# Patient Record
Sex: MALE | Race: WHITE | NOT HISPANIC OR LATINO | Employment: FULL TIME | URBAN - METROPOLITAN AREA
[De-identification: names, ages, dates, MRNs, and addresses within clinical notes are randomized per-mention and may not be internally consistent; named-entity substitution may affect disease eponyms.]

---

## 2017-02-01 ENCOUNTER — GENERIC CONVERSION - ENCOUNTER (OUTPATIENT)
Dept: OTHER | Facility: OTHER | Age: 52
End: 2017-02-01

## 2017-02-13 ENCOUNTER — GENERIC CONVERSION - ENCOUNTER (OUTPATIENT)
Dept: OTHER | Facility: OTHER | Age: 52
End: 2017-02-13

## 2017-02-13 LAB
A/G RATIO (HISTORICAL): 1.6 (ref 1.1–2.5)
ALBUMIN SERPL BCP-MCNC: 4 G/DL (ref 3.5–5.5)
ALP SERPL-CCNC: 84 IU/L (ref 39–117)
ALT SERPL W P-5'-P-CCNC: 31 IU/L (ref 0–44)
AST SERPL W P-5'-P-CCNC: 22 IU/L (ref 0–40)
BASOPHILS # BLD AUTO: 0 %
BASOPHILS # BLD AUTO: 0 X10E3/UL (ref 0–0.2)
BILIRUB SERPL-MCNC: 0.3 MG/DL (ref 0–1.2)
BUN SERPL-MCNC: 16 MG/DL (ref 6–24)
BUN/CREA RATIO (HISTORICAL): 20 (ref 9–20)
CALCIUM SERPL-MCNC: 8.9 MG/DL (ref 8.7–10.2)
CHLORIDE SERPL-SCNC: 99 MMOL/L (ref 96–106)
CHOLEST SERPL-MCNC: 263 MG/DL (ref 100–199)
CHOLEST/HDLC SERPL: 5.3 RATIO UNITS (ref 0–5)
CO2 SERPL-SCNC: 23 MMOL/L (ref 18–29)
CREAT SERPL-MCNC: 0.82 MG/DL (ref 0.76–1.27)
DEPRECATED RDW RBC AUTO: 13.3 % (ref 12.3–15.4)
EGFR AFRICAN AMERICAN (HISTORICAL): 117 ML/MIN/1.73
EGFR-AMERICAN CALC (HISTORICAL): 101 ML/MIN/1.73
EOSINOPHIL # BLD AUTO: 0.2 X10E3/UL (ref 0–0.4)
EOSINOPHIL # BLD AUTO: 4 %
GLUCOSE SERPL-MCNC: 147 MG/DL (ref 65–99)
HBA1C MFR BLD HPLC: 7.7 % (ref 4.8–5.6)
HCT VFR BLD AUTO: 42.5 % (ref 37.5–51)
HDLC SERPL-MCNC: 50 MG/DL
HGB BLD-MCNC: 14.5 G/DL (ref 12.6–17.7)
IMM.GRANULOCYTES (CD4/8) (HISTORICAL): 0 %
IMM.GRANULOCYTES (CD4/8) (HISTORICAL): 0 X10E3/UL (ref 0–0.1)
LDLC SERPL CALC-MCNC: 174 MG/DL (ref 0–99)
LYMPHOCYTES # BLD AUTO: 1.3 X10E3/UL (ref 0.7–3.1)
LYMPHOCYTES # BLD AUTO: 27 %
MCH RBC QN AUTO: 30.9 PG (ref 26.6–33)
MCHC RBC AUTO-ENTMCNC: 34.1 G/DL (ref 31.5–35.7)
MCV RBC AUTO: 91 FL (ref 79–97)
MONOCYTES # BLD AUTO: 0.3 X10E3/UL (ref 0.1–0.9)
MONOCYTES (HISTORICAL): 7 %
NEUTROPHILS # BLD AUTO: 3 X10E3/UL (ref 1.4–7)
NEUTROPHILS # BLD AUTO: 62 %
PLATELET # BLD AUTO: 216 X10E3/UL (ref 150–379)
POTASSIUM SERPL-SCNC: 4.9 MMOL/L (ref 3.5–5.2)
RBC (HISTORICAL): 4.69 X10E6/UL (ref 4.14–5.8)
SODIUM SERPL-SCNC: 140 MMOL/L (ref 134–144)
TOT. GLOBULIN, SERUM (HISTORICAL): 2.5 G/DL (ref 1.5–4.5)
TOTAL PROTEIN (HISTORICAL): 6.5 G/DL (ref 6–8.5)
TRIGL SERPL-MCNC: 194 MG/DL (ref 0–149)
VLDLC SERPL CALC-MCNC: 39 MG/DL (ref 5–40)
WBC # BLD AUTO: 4.9 X10E3/UL (ref 3.4–10.8)

## 2017-02-14 LAB — INTERPRETATION (HISTORICAL): NORMAL

## 2017-02-15 ENCOUNTER — GENERIC CONVERSION - ENCOUNTER (OUTPATIENT)
Dept: OTHER | Facility: OTHER | Age: 52
End: 2017-02-15

## 2017-02-16 ENCOUNTER — ALLSCRIPTS OFFICE VISIT (OUTPATIENT)
Dept: OTHER | Facility: OTHER | Age: 52
End: 2017-02-16

## 2017-03-23 ENCOUNTER — ALLSCRIPTS OFFICE VISIT (OUTPATIENT)
Dept: OTHER | Facility: OTHER | Age: 52
End: 2017-03-23

## 2017-06-15 ENCOUNTER — ALLSCRIPTS OFFICE VISIT (OUTPATIENT)
Dept: OTHER | Facility: OTHER | Age: 52
End: 2017-06-15

## 2017-06-20 ENCOUNTER — APPOINTMENT (OUTPATIENT)
Dept: LAB | Age: 52
End: 2017-06-20
Attending: PREVENTIVE MEDICINE

## 2017-06-20 ENCOUNTER — TRANSCRIBE ORDERS (OUTPATIENT)
Dept: ADMINISTRATIVE | Age: 52
End: 2017-06-20

## 2017-06-20 ENCOUNTER — APPOINTMENT (OUTPATIENT)
Dept: RADIOLOGY | Age: 52
End: 2017-06-20
Attending: PREVENTIVE MEDICINE

## 2017-06-20 DIAGNOSIS — Z00.00 ROUTINE GENERAL MEDICAL EXAMINATION AT A HEALTH CARE FACILITY: Primary | ICD-10-CM

## 2017-06-20 DIAGNOSIS — Z00.00 ROUTINE GENERAL MEDICAL EXAMINATION AT A HEALTH CARE FACILITY: ICD-10-CM

## 2017-06-20 LAB
BACTERIA UR QL AUTO: ABNORMAL /HPF
BASOPHILS # BLD AUTO: 0.02 THOUSANDS/ΜL (ref 0–0.1)
BASOPHILS NFR BLD AUTO: 0 % (ref 0–1)
BILIRUB UR QL STRIP: NEGATIVE
BUN SERPL-MCNC: 22 MG/DL (ref 5–25)
CLARITY UR: CLEAR
COLOR UR: YELLOW
CREAT SERPL-MCNC: 0.79 MG/DL (ref 0.6–1.3)
EOSINOPHIL # BLD AUTO: 0.15 THOUSAND/ΜL (ref 0–0.61)
EOSINOPHIL NFR BLD AUTO: 2 % (ref 0–6)
ERYTHROCYTE [DISTWIDTH] IN BLOOD BY AUTOMATED COUNT: 12.8 % (ref 11.6–15.1)
GFR SERPL CREATININE-BSD FRML MDRD: >60 ML/MIN/1.73SQ M
GLUCOSE UR STRIP-MCNC: NEGATIVE MG/DL
HCT VFR BLD AUTO: 39.7 % (ref 36.5–49.3)
HGB BLD-MCNC: 13.3 G/DL (ref 12–17)
HGB UR QL STRIP.AUTO: NEGATIVE
HYALINE CASTS #/AREA URNS LPF: ABNORMAL /LPF
KETONES UR STRIP-MCNC: NEGATIVE MG/DL
LEUKOCYTE ESTERASE UR QL STRIP: NEGATIVE
LYMPHOCYTES # BLD AUTO: 1.36 THOUSANDS/ΜL (ref 0.6–4.47)
LYMPHOCYTES NFR BLD AUTO: 20 % (ref 14–44)
MCH RBC QN AUTO: 30.2 PG (ref 26.8–34.3)
MCHC RBC AUTO-ENTMCNC: 33.5 G/DL (ref 31.4–37.4)
MCV RBC AUTO: 90 FL (ref 82–98)
MONOCYTES # BLD AUTO: 0.38 THOUSAND/ΜL (ref 0.17–1.22)
MONOCYTES NFR BLD AUTO: 6 % (ref 4–12)
NEUTROPHILS # BLD AUTO: 5 THOUSANDS/ΜL (ref 1.85–7.62)
NEUTS SEG NFR BLD AUTO: 72 % (ref 43–75)
NITRITE UR QL STRIP: NEGATIVE
NON-SQ EPI CELLS URNS QL MICRO: ABNORMAL /HPF
NRBC BLD AUTO-RTO: 0 /100 WBCS
PH UR STRIP.AUTO: 6 [PH] (ref 4.5–8)
PLATELET # BLD AUTO: 280 THOUSANDS/UL (ref 149–390)
PMV BLD AUTO: 10.1 FL (ref 8.9–12.7)
PROT UR STRIP-MCNC: ABNORMAL MG/DL
RBC # BLD AUTO: 4.41 MILLION/UL (ref 3.88–5.62)
RBC #/AREA URNS AUTO: ABNORMAL /HPF
SP GR UR STRIP.AUTO: 1.01 (ref 1–1.03)
UROBILINOGEN UR QL STRIP.AUTO: 0.2 E.U./DL
WBC # BLD AUTO: 6.94 THOUSAND/UL (ref 4.31–10.16)
WBC #/AREA URNS AUTO: ABNORMAL /HPF

## 2017-06-20 PROCEDURE — 84520 ASSAY OF UREA NITROGEN: CPT

## 2017-06-20 PROCEDURE — 84202 ASSAY RBC PROTOPORPHYRIN: CPT

## 2017-06-20 PROCEDURE — 82300 ASSAY OF CADMIUM: CPT

## 2017-06-20 PROCEDURE — 36415 COLL VENOUS BLD VENIPUNCTURE: CPT

## 2017-06-20 PROCEDURE — 82570 ASSAY OF URINE CREATININE: CPT | Performed by: PREVENTIVE MEDICINE

## 2017-06-20 PROCEDURE — 83655 ASSAY OF LEAD: CPT | Performed by: PREVENTIVE MEDICINE

## 2017-06-20 PROCEDURE — 82175 ASSAY OF ARSENIC: CPT | Performed by: PREVENTIVE MEDICINE

## 2017-06-20 PROCEDURE — 81001 URINALYSIS AUTO W/SCOPE: CPT | Performed by: PREVENTIVE MEDICINE

## 2017-06-20 PROCEDURE — 86870 RBC ANTIBODY IDENTIFICATION: CPT

## 2017-06-20 PROCEDURE — 83825 ASSAY OF MERCURY: CPT | Performed by: PREVENTIVE MEDICINE

## 2017-06-20 PROCEDURE — 82232 ASSAY OF BETA-2 PROTEIN: CPT | Performed by: PREVENTIVE MEDICINE

## 2017-06-20 PROCEDURE — 82565 ASSAY OF CREATININE: CPT

## 2017-06-20 PROCEDURE — 85025 COMPLETE CBC W/AUTO DIFF WBC: CPT

## 2017-06-20 PROCEDURE — 71010 HB CHEST X-RAY 1 VIEW FRONTAL: CPT

## 2017-06-21 LAB
ARSENIC 24H UR-MCNC: NORMAL UG/L (ref 0–50)
B2 MICROGLOB UR-MCNC: 19 UG/L (ref 0–300)
CREAT UR-MCNC: 0.96 G/L (ref 0.3–3)
INORG ARSENIC UR-MCNC: NORMAL UG/L (ref 0–19)
LEAD 24H UR-MCNC: NORMAL UG/L (ref 0–49)
MERCURY 24H UR-MCNC: NORMAL UG/L (ref 0–19)

## 2017-06-22 LAB
CADMIUM BLD-MCNC: NORMAL UG/L (ref 0–1.2)
FEP BLD-MCNC: 25 UG/DL (ref 0–100)
LEAD BLD-MCNC: 2 UG/DL (ref 0–19)
ZPP RBC-MCNC: 28 UG/DL (ref 0–100)

## 2017-07-19 ENCOUNTER — GENERIC CONVERSION - ENCOUNTER (OUTPATIENT)
Dept: OTHER | Facility: OTHER | Age: 52
End: 2017-07-19

## 2017-07-19 ENCOUNTER — ALLSCRIPTS OFFICE VISIT (OUTPATIENT)
Dept: OTHER | Facility: OTHER | Age: 52
End: 2017-07-19

## 2017-07-19 DIAGNOSIS — E11.622 TYPE 2 DIABETES MELLITUS WITH OTHER SKIN ULCER (CODE) (HCC): ICD-10-CM

## 2017-07-19 DIAGNOSIS — Z89.519: ICD-10-CM

## 2017-07-20 ENCOUNTER — HOSPITAL ENCOUNTER (OUTPATIENT)
Dept: RADIOLOGY | Facility: HOSPITAL | Age: 52
Discharge: HOME | End: 2017-07-20
Attending: FAMILY MEDICINE
Payer: COMMERCIAL

## 2017-07-20 ENCOUNTER — TRANSCRIBE ORDERS (OUTPATIENT)
Dept: ADMINISTRATIVE | Facility: HOSPITAL | Age: 52
End: 2017-07-20

## 2017-07-20 DIAGNOSIS — Z89.512 STATUS POST BELOW KNEE AMPUTATION OF LEFT LOWER EXTREMITY: ICD-10-CM

## 2017-07-20 DIAGNOSIS — L98.499 DIABETES MELLITUS WITH SKIN ULCER (HCC): Primary | ICD-10-CM

## 2017-07-20 DIAGNOSIS — L98.499 DIABETES MELLITUS WITH SKIN ULCER (HCC): ICD-10-CM

## 2017-07-20 DIAGNOSIS — E11.622 DIABETES MELLITUS WITH SKIN ULCER (HCC): ICD-10-CM

## 2017-07-20 DIAGNOSIS — E11.622 DIABETES MELLITUS WITH SKIN ULCER (HCC): Primary | ICD-10-CM

## 2017-07-20 PROCEDURE — 73552 X-RAY EXAM OF FEMUR 2/>: CPT

## 2017-07-21 ENCOUNTER — GENERIC CONVERSION - ENCOUNTER (OUTPATIENT)
Dept: OTHER | Facility: OTHER | Age: 52
End: 2017-07-21

## 2017-07-24 ENCOUNTER — GENERIC CONVERSION - ENCOUNTER (OUTPATIENT)
Dept: OTHER | Facility: OTHER | Age: 52
End: 2017-07-24

## 2017-07-24 LAB
CULTURE RESULT (HISTORICAL): ABNORMAL
RESULT 1 (HISTORICAL): ABNORMAL
SUSCEP. REFLEX (HISTORICAL): ABNORMAL

## 2017-07-26 ENCOUNTER — APPOINTMENT (OUTPATIENT)
Dept: WOUND CARE | Facility: HOSPITAL | Age: 52
End: 2017-07-26
Payer: COMMERCIAL

## 2017-07-26 PROCEDURE — G0463 HOSPITAL OUTPT CLINIC VISIT: HCPCS

## 2017-07-26 PROCEDURE — 99213 OFFICE O/P EST LOW 20 MIN: CPT

## 2017-07-26 PROCEDURE — 97597 DBRDMT OPN WND 1ST 20 CM/<: CPT

## 2017-08-02 ENCOUNTER — APPOINTMENT (OUTPATIENT)
Dept: WOUND CARE | Facility: HOSPITAL | Age: 52
End: 2017-08-02
Payer: COMMERCIAL

## 2017-08-02 PROCEDURE — 97597 DBRDMT OPN WND 1ST 20 CM/<: CPT

## 2017-08-09 ENCOUNTER — APPOINTMENT (OUTPATIENT)
Dept: WOUND CARE | Facility: HOSPITAL | Age: 52
End: 2017-08-09
Payer: COMMERCIAL

## 2017-08-09 PROCEDURE — 97597 DBRDMT OPN WND 1ST 20 CM/<: CPT

## 2017-08-15 ENCOUNTER — GENERIC CONVERSION - ENCOUNTER (OUTPATIENT)
Dept: OTHER | Facility: OTHER | Age: 52
End: 2017-08-15

## 2017-08-16 ENCOUNTER — APPOINTMENT (OUTPATIENT)
Dept: WOUND CARE | Facility: HOSPITAL | Age: 52
End: 2017-08-16
Payer: COMMERCIAL

## 2017-08-16 PROCEDURE — G0463 HOSPITAL OUTPT CLINIC VISIT: HCPCS

## 2017-08-16 PROCEDURE — 99212 OFFICE O/P EST SF 10 MIN: CPT

## 2017-10-30 ENCOUNTER — GENERIC CONVERSION - ENCOUNTER (OUTPATIENT)
Dept: OTHER | Facility: OTHER | Age: 52
End: 2017-10-30

## 2017-10-30 LAB
A/G RATIO (HISTORICAL): 1.7 (ref 1.2–2.2)
ALBUMIN SERPL BCP-MCNC: 4.3 G/DL (ref 3.5–5.5)
ALP SERPL-CCNC: 90 IU/L (ref 39–117)
ALT SERPL W P-5'-P-CCNC: 24 IU/L (ref 0–44)
AST SERPL W P-5'-P-CCNC: 21 IU/L (ref 0–40)
BILIRUB SERPL-MCNC: 0.4 MG/DL (ref 0–1.2)
BUN SERPL-MCNC: 16 MG/DL (ref 6–24)
BUN/CREA RATIO (HISTORICAL): 18 (ref 9–20)
CALCIUM SERPL-MCNC: 9.1 MG/DL (ref 8.7–10.2)
CHLORIDE SERPL-SCNC: 101 MMOL/L (ref 96–106)
CHOLEST SERPL-MCNC: 208 MG/DL (ref 100–199)
CHOLEST/HDLC SERPL: 3.9 RATIO UNITS (ref 0–5)
CO2 SERPL-SCNC: 24 MMOL/L (ref 18–29)
CREAT SERPL-MCNC: 0.91 MG/DL (ref 0.76–1.27)
EGFR AFRICAN AMERICAN (HISTORICAL): 110 ML/MIN/1.73
EGFR-AMERICAN CALC (HISTORICAL): 95 ML/MIN/1.73
GLUCOSE SERPL-MCNC: 203 MG/DL (ref 65–99)
HBA1C MFR BLD HPLC: 8.4 % (ref 4.8–5.6)
HDLC SERPL-MCNC: 53 MG/DL
LDLC SERPL CALC-MCNC: 124 MG/DL (ref 0–99)
POTASSIUM SERPL-SCNC: 4.9 MMOL/L (ref 3.5–5.2)
SODIUM SERPL-SCNC: 140 MMOL/L (ref 134–144)
TOT. GLOBULIN, SERUM (HISTORICAL): 2.5 G/DL (ref 1.5–4.5)
TOTAL PROTEIN (HISTORICAL): 6.8 G/DL (ref 6–8.5)
TRIGL SERPL-MCNC: 155 MG/DL (ref 0–149)
VLDLC SERPL CALC-MCNC: 31 MG/DL (ref 5–40)

## 2017-10-31 LAB
BASOPHILS # BLD AUTO: 0 X10E3/UL (ref 0–0.2)
BASOPHILS # BLD AUTO: 1 %
DEPRECATED RDW RBC AUTO: 12.9 % (ref 12.3–15.4)
EOSINOPHIL # BLD AUTO: 0.2 X10E3/UL (ref 0–0.4)
EOSINOPHIL # BLD AUTO: 5 %
HCT VFR BLD AUTO: 41.9 % (ref 37.5–51)
HGB BLD-MCNC: 14 G/DL (ref 12.6–17.7)
IMM.GRANULOCYTES (CD4/8) (HISTORICAL): 0 %
IMM.GRANULOCYTES (CD4/8) (HISTORICAL): 0 X10E3/UL (ref 0–0.1)
INTERPRETATION (HISTORICAL): NORMAL
LYMPHOCYTES # BLD AUTO: 1.1 X10E3/UL (ref 0.7–3.1)
LYMPHOCYTES # BLD AUTO: 27 %
MCH RBC QN AUTO: 30.8 PG (ref 26.6–33)
MCHC RBC AUTO-ENTMCNC: 33.4 G/DL (ref 31.5–35.7)
MCV RBC AUTO: 92 FL (ref 79–97)
MONOCYTES # BLD AUTO: 0.3 X10E3/UL (ref 0.1–0.9)
MONOCYTES (HISTORICAL): 9 %
NEUTROPHILS # BLD AUTO: 2.3 X10E3/UL (ref 1.4–7)
NEUTROPHILS # BLD AUTO: 58 %
PLATELET # BLD AUTO: 191 X10E3/UL (ref 150–379)
RBC (HISTORICAL): 4.54 X10E6/UL (ref 4.14–5.8)
WBC # BLD AUTO: 3.9 X10E3/UL (ref 3.4–10.8)

## 2017-11-01 ENCOUNTER — GENERIC CONVERSION - ENCOUNTER (OUTPATIENT)
Dept: OTHER | Facility: OTHER | Age: 52
End: 2017-11-01

## 2017-11-22 ENCOUNTER — ALLSCRIPTS OFFICE VISIT (OUTPATIENT)
Dept: OTHER | Facility: OTHER | Age: 52
End: 2017-11-22

## 2017-11-23 NOTE — PROGRESS NOTES
Assessment    1  Benign essential hypertension (401 1) (I10)   2  Diabetes mellitus with peripheral circulatory disorder, controlled (250 70,443 81) (E11 51)   3  Hyperlipidemia (272 4) (E78 5)   4  Adult BMI 28 0-28 9 kg/sq m (V85 24) (Z68 28)    Plan  Benign essential hypertension    · AmLODIPine Besylate 2 5 MG Oral Tablet; Take 1 tablet daily   · Lisinopril 40 MG Oral Tablet; Take 1 tablet daily   · Call [phone] if: You become dizzy or lightheaded, especially when you stand upafter sitting for a while ; Status:Complete;   Done: 96KFP2318   · Call [phone] if: You develop double vision (see two of everything)  ;Status:Complete;   Done: 18HPZ3233   · Call [phone] if: Your blood pressure is frequently higher than 140/90  ;Status:Complete;   Done: 88YTJ1349   · A diet low in sodium and high in potassium, magnesium, and calcium can help yourblood pressure ; Status:Complete;   Done: 56CBB9251   · There are many exercise options for seniors ; Status:Complete;   Done: 42WLP8410   · We recommend that you bring your body mass index down to 26 ; Status:Complete;  Done: 79CNI2265   · Follow-up visit in 1 month Evaluation and Treatment  Follow-up  Status: Hold For -Scheduling  Requested for: 22Nov2017  Diabetes mellitus with peripheral circulatory disorder, controlled    · Levemir FlexTouch 100 UNIT/ML Subcutaneous Solution Pen-injector   · Lantus SoloStar 100 UNIT/ML Subcutaneous Solution Pen-injector; 54 units sqdaily  Hyperlipidemia    · Atorvastatin Calcium 40 MG Oral Tablet   · Rosuvastatin Calcium 20 MG Oral Tablet; Take 1 tablet daily    Discussion/Summary    BP is not well controlled but he has been missing doses  Will not change meds for now but will change the timing to take both in AM so he does not miss doses  will change from atorvastatin to rosuvastatin for better lipid control  try to get lantus covered as glucose control was much better on this medication   He and his wife are aware this may not be approved by insurance  Asked to call with fasting and post prandial glucose readings in 2 weeks  up bp check in 1 month  local care to folliculitis under prosthesis, which is improving  Call for any worsening symptoms  Chief Complaint  Pt here for a medication f/u and b/w review  sp/cma      History of Present Illness  He and his wife feel his glucose has been elevated ever since changing from lantus to levemir, due to insurance  They would like to go back on lantus  been forgetting nighttime doses of statin and bp meds because he can only remember to take things in the morning  a few pimples on his leg under his prosthesis, which his wife popped and cleaned before using antibiotic ointment  They thought it may be a reaction to the new base to his prosthesis  The patient states he has been stable with his Type II Diabetes control since the last visit  Symptoms:   The patient states his hyperlipidemia has been under good control since the last visit  Comorbid Illnesses: diabetes mellitus-- and-- hypertension  He has no significant interval events  Symptoms: The patient is currently asymptomatic  Associated symptoms include no focal neurologic deficits-- and-- no memory loss  Medications: the patient is adherent with his medication regimen  -- He denies medication side effects  The patient is not doing well with his hyperlipidemia goals  the patient's LDL goal is 100 mg/dL  The patient presents for follow-up of essential hypertension  The patient states he has been doing well with his blood pressure control since the last visit  He has no significant interval events  Symptoms: The patient is currently asymptomatic  Home monitoring: The patient is not checking blood pressure at home  Medications: the patient is not adherent with his medication regimen  -- He denies medication side effects        Review of Systems   Constitutional: No fever or chills, feels well, no tiredness, no recent weight gain or weight loss  Cardiovascular: No complaints of slow heart rate, no fast heart rate, no chest pain, no palpitations, no leg claudication, no lower extremity  Respiratory: No complaints of shortness of breath, no wheezing, no cough, no SOB on exertion, no orthopnea or PND  Active Problems    1  Anxiety disorder (300 00) (F41 9)   2  Benign essential hypertension (401 1) (I10)   3  Colon cancer screening (V76 51) (Z12 11)   4  Diabetes mellitus with peripheral circulatory disorder, controlled (250 70,443 81) (E11 51)   5  Diabetic ulcer of left lower leg with fat layer exposed (250 80,707 10) (E11 622,L97 922)   6  Encounter for colorectal cancer screening (V76 51) (Z12 11,Z12 12)   7  Erectile dysfunction (607 84) (F52 21)   8  Hyperlipidemia (272 4) (E78 5)   9  Need for prophylactic vaccination and inoculation against influenza (V04 81) (Z23)   10  Peripheral arterial disease (443 9) (I73 9)   11  Pre-op evaluation (V72 84) (Z01 818)   12  S/P BKA (below knee amputation) (V49 75) (Z89 519)   13  Ulcer of leg, chronic (707 10) (L97 909)    Past Medical History    The active problems and past medical history were reviewed and updated today  Surgical History  1  History of Amputation Of Leg Below Knee    The surgical history was reviewed and updated today  Family History  Mother    1  No pertinent family history    The family history was reviewed and updated today  Social History     · Former smoker (C66 23) (P53 285)  The social history was reviewed and updated today  The social history was reviewed and is unchanged  Current Meds   1  AmLODIPine Besylate 2 5 MG Oral Tablet; Take 1 tablet daily; Therapy: 96QSE5878 to (Evaluate:91Ujc7784)  Requested for: 98ZRP3454; Last Rx:01Wfb3649 Ordered   2  Atorvastatin Calcium 40 MG Oral Tablet; TAKE 1 TABLET DAILY AS     DIRECTED; Therapy: 78WWN0793 to (Evaluate:59Jld6799)  Requested for: 57OPW6263; Last Rx:06Avj7439 Ordered   3   [employer] In Vitro Strip; test QID; diagnosis insulin requiring DM; Therapy: 73Ifn5396 to (Last UN:40TYN3510)  Requested for: 71TZT0797 Ordered   4  Carlos Microlet Lancets Miscellaneous; test QID; dx:  insulin requiring DM; Therapy: 88Yca3937 to  Requested for: 14YDY6728 Recorded   5  BD Pen Needle Mini U/F 31G X 5 MM Miscellaneous; USE AS DIRECTED once daily; Therapy: 72QNB9271 to (Evaluate:67Evf5650)  Requested for: 63JKZ2673; Last Rx:12Jan2017 Ordered   6  Levemir FlexTouch 100 UNIT/ML Subcutaneous Solution Pen-injector; 52 units qhs; Therapy: 31QDV3008 to (Last Rx:15Jun2017)  Requested for: 55LRJ6301 Ordered   7  Lisinopril 40 MG Oral Tablet; Take 1 tablet daily; Therapy: 37MNU5580 to (Evaluate:52Acg4330)  Requested for: 74Xzs3111; Last Rx:68Pyz2061 Ordered   8  Viagra 100 MG Oral Tablet; as directed; Therapy: 69QVC8647 to (OLKYLECF:86BLW3790)  Requested for: 29FGO5563; Last Rx:19Jan2015 Ordered    The medication list was reviewed and updated today  Allergies  1  Penicillins  2  Bee sting    Vitals  Vital Signs    Recorded: 22Nov2017 04:48PM Recorded: 22Nov2017 03:42PM   Temperature  97 4 F   Heart Rate  80   Respiration  16   Systolic 292 389   Diastolic 90 261   BP Comments recheck    Weight  182 lb    BMI Calculated  28 51   BSA Calculated  1 94       Physical Exam   Constitutional  General appearance: No acute distress, well appearing and well nourished  Ears, Nose, Mouth, and Throat  Oropharynx: Normal with no erythema, edema, exudate or lesions  Pulmonary  Respiratory effort: No increased work of breathing or signs of respiratory distress  Auscultation of lungs: Clear to auscultation, equal breath sounds bilaterally, no wheezes, no rales, no rhonci  Cardiovascular  Auscultation of heart: Normal rate and rhythm, normal S1 and S2, without murmurs  Examination of extremities for edema and/or varicosities: Normal    Carotid pulses: Normal    Lymphatic  Palpation of lymph nodes in neck: No lymphadenopathy  Skin  Skin and subcutaneous tissue: Abnormal  -- (L lateral knee with follicularly based erythema with no fluctuance or edema)        Results/Data  (1) CBC/PLT/DIFF 30Oct2017 09:50AM Vida Niño     Test Name Result Flag Reference   WBC 3 9 x10E3/uL  3 4-10 8   RBC 4 54 x10E6/uL  4 14-5 80   Hemoglobin 14 0 g/dL  12 6-17 7   Hematocrit 41 9 %  37 5-51 0   MCV 92 fL  79-97   MCH 30 8 pg  26 6-33 0   MCHC 33 4 g/dL  31 5-35 7   RDW 12 9 %  12 3-15 4   Platelets 899 B81N1/BM  150-379   Neutrophils 58 %  Not Estab  Lymphs 27 %  Not Estab  Monocytes 9 %  Not Estab  Eos 5 %  Not Estab  Basos 1 %  Not Estab  Neutrophils (Absolute) 2 3 x10E3/uL  1 4-7 0   Lymphs (Absolute) 1 1 x10E3/uL  0 7-3 1   Monocytes(Absolute) 0 3 x10E3/uL  0 1-0 9   Eos (Absolute) 0 2 x10E3/uL  0 0-0 4   Baso (Absolute) 0 0 x10E3/uL  0 0-0 2   Immature Granulocytes 0 %  Not Estab     Immature Grans (Abs) 0 0 x10E3/uL  0 0-0 1     (1) COMPREHENSIVE METABOLIC PANEL 62DCT0014 02:58TT Oneil Sternjovany     Test Name Result Flag Reference   Glucose, Serum 203 mg/dL H 65-99   BUN 16 mg/dL  6-24   Creatinine, Serum 0 91 mg/dL  0 76-1 27   BUN/Creatinine Ratio 18  9-20   Sodium, Serum 140 mmol/L  134-144   Potassium, Serum 4 9 mmol/L  3 5-5 2   Chloride, Serum 101 mmol/L     Carbon Dioxide, Total 24 mmol/L  18-29   Calcium, Serum 9 1 mg/dL  8 7-10 2   Protein, Total, Serum 6 8 g/dL  6 0-8 5   Albumin, Serum 4 3 g/dL  3 5-5 5   Globulin, Total 2 5 g/dL  1 5-4 5   A/G Ratio 1 7  1 2-2 2   Bilirubin, Total 0 4 mg/dL  0 0-1 2   Alkaline Phosphatase, S 90 IU/L     AST (SGOT) 21 IU/L  0-40   ALT (SGPT) 24 IU/L  0-44   eGFR If NonAfricn Am 95 mL/min/1 73  >59   eGFR If Africn Am 110 mL/min/1 73  >59     (1) LIPID PANEL, FASTING 30Oct2017 09:50AM Oneil Stern     Test Name Result Flag Reference   Cholesterol, Total 208 mg/dL H 100-199   Triglycerides 155 mg/dL H 0-149   HDL Cholesterol 53 mg/dL  >39   VLDL Cholesterol Nir 31 mg/dL  5-40 LDL Cholesterol Calc 124 mg/dL H 0-99   T  Chol/HDL Ratio 3 9 ratio units  0 0-5 0     T  Chol/HDL Ratio                                                            Men  Women                                              1/2 Avg  Risk  3 4    3 3                                                  Avg Risk  5 0    4 4                                               2X Avg  Risk  9 6    7 1                                               3X Avg  Risk 23 4   11 0     (1) HEMOGLOBIN A1C 30Oct2017 09:50AM Latricia Stern     Test Name Result Flag Reference   Hemoglobin A1c 8 4 % H 4 8-5 6     Pre-diabetes: 5 7 - 6 4          Diabetes: >6 4          Glycemic control for adults with diabetes: <7 0       Signatures   Electronically signed by : BURTON Bailon ; Nov 22 2017  4:50PM EST                       (Author)

## 2017-12-27 LAB
LEFT EYE DIABETIC RETINOPATHY: NORMAL
RIGHT EYE DIABETIC RETINOPATHY: NORMAL

## 2018-01-10 NOTE — RESULT NOTES
Discussion/Summary   culture gre out Staphylococcus and it is suscptable to antibiotic given     Verified Results  (LC) Aerobic Bacterial Culture 41PQR6563 12:00AM Reedash Goncalvesner     Test Name Result Flag Reference   Aerobic Bacterial Culture Final report A    Result 1  A    Staphylococcus aureus   Heavy growth  Based on resistance to penicillin and susceptibility to oxacillin  this isolate would be susceptible to:  * Penicillinase-stable penicillins; such as:      Cloxacillin      Dicloxacillin      Nafcillin  * Beta-lactam/beta-lactamase inhibitor combinations; such as:      Amoxicillin-clavulanic acid      Ampicillin-sulbactam  * Antistaphylococcal cephems; such as:      Cefaclor      Cefuroxime  * Antistaphylococcal carbapenems; such as:      Imipenem      Meropenem   Antimicrobial Susceptibility Comment     ** S = Susceptible; I = Intermediate; R = Resistant **                     P = Positive; N = Negative              MICS are expressed in micrograms per mL     Antibiotic                 RSLT#1    RSLT#2    RSLT#3    RSLT#4  Ciprofloxacin                  S  Clindamycin                    R  Erythromycin                   R  Gentamicin                     S  Levofloxacin                   S  Linezolid                      S  Moxifloxacin                   S  Oxacillin                      S  Penicillin                     R  Quinupristin/Dalfopristin      S  Rifampin                       S  Tetracycline                   R  Trimethoprim/Sulfa             S  Vancomycin                     S

## 2018-01-11 NOTE — PROGRESS NOTES
Assessment    1  Boil (680 9) (L02 92)   2  Benign essential hypertension (401 1) (I10)    Plan  Benign essential hypertension    · AmLODIPine Besylate 2 5 MG Oral Tablet; Take 1 tablet daily  Boil    · Mupirocin Calcium 2 % External Cream (Bactroban); APPLY AND GENTLY  MASSAGE INTO AFFECTED AREA(S) TWICE DAILY   · Cephalexin 500 MG Oral Tablet (Cephalexin Monohydrate); TAKE 1 TABLET 3  TIMES DAILY    Discussion/Summary    He will add amlodipine for his blood pressure and follow up in 2 weeks for bp recheck  Advised topicals, wound care, and oral antibiotics for R thigh boil  He will call or return sooner than 2 weeks if not improving  Chief Complaint  red raised lump on right leg above knee      History of Present Illness  HPI: Started 2 days ago with a pimple on R thigh above knee  Now has redness and tenderness around the lesion  No fever or constitutional symptoms  BP has been up  Had to put off recent eye surgery  States he is compliant with his medications  Review of Systems    Constitutional: no fever or chills, feels well, no tiredness, no recent weight loss or weight gain  Active Problems    1  Anxiety disorder (300 00) (F41 9)   2  Atherosclerosis of native artery of other extremity with ulceration (440 23,707 9) (I70 25)   3  Benign essential hypertension (401 1) (I10)   4  Chronic Non-pressure Ulcer Of The Lower Limbs (707 10)   5  Colon cancer screening (V76 51) (Z12 11)   6  Diabetes Mellitus (250 00)   7  Diabetes mellitus with peripheral circulatory disorder, controlled (250 70,443 81)   (E11 51)   8  Diabetic foot ulcer (250 80,707 15) (E11 621,L97 509)   9  Erectile dysfunction (607 84) (F52 21)   10  Hyperlipidemia (272 4) (E78 5)   11  Need for prophylactic vaccination and inoculation against influenza (V04 81) (Z23)   12  Peripheral arterial disease (443 9) (I73 9)   13  Pre-op evaluation (V72 84) (Z01 818)   14  Pressure ulcer of buttock (707 05,707 20) (L89 309)   15   Type 2 diabetes mellitus (250 00) (E11 9)   16  Type 2 diabetes mellitus with hyperglycemia (250 00) (E11 65)   17  Ulcer of leg, chronic (707 10) (L97 909)   18  Uncontrolled diabetes mellitus type 2 with atherosclerosis of arteries of extremities    (250 72,440 20) (E11 65,E11 59,I70 209)    Past Medical History    1  Chronic Non-pressure Ulcer Of The Lower Limbs (707 10)   2  Diabetic foot ulcer (250 80,707 15) (E11 621,L97 509)  Active Problems And Past Medical History Reviewed: The active problems and past medical history were reviewed and updated today  Family History    1  No pertinent family history  Family History Reviewed: The family history was reviewed and updated today  Social History    · Former smoker (T39 52) (O07 552)  The social history was reviewed and updated today  The social history was reviewed and is unchanged  Surgical History    1  History of Amputation Of Leg Below Knee  Surgical History Reviewed: The surgical history was reviewed and updated today  Current Meds   1  Atorvastatin Calcium 40 MG Oral Tablet; TAKE 1 TABLET DAILY AS DIRECTED; Therapy: 27BPT9488 to (Joselyn Hickman)  Requested for: 04Kat3986; Last   Rx:56Ise9589 Ordered   2  Carlos Contour Test In [employer]; test QID; diagnosis insulin requiring DM; Therapy: 54Url6617 to (Last TD:24LXJ7224)  Requested for: 83TRL9064 Ordered   3  Carlos Microlet Lancets Miscellaneous; test QID; dx:  insulin requiring DM; Therapy: 62Sde5418 to  Requested for: 62LJC2055 Recorded   4  BD Pen Needle Mini U/F 31G X 5 MM Miscellaneous; USE AS DIRECTED once daily; Therapy: 48CNU0428 to (Evaluate:06Jan2016)  Requested for: 08TDD6285; Last   Rx:08Oct2015 Ordered   5  Lantus SoloStar 100 UNIT/ML Subcutaneous Solution Pen-injector; INJECT   SUBCUTANEOUSLY 44 units; Therapy: 74Lnd3594 to (Thais Field)  Requested for: 10Eyh3115; Last   Rx:73Qag3720 Ordered   6  Lisinopril 40 MG Oral Tablet;  Take 1 tablet daily; Therapy: 35MHI6290 to (Evaluate:27Jml3808)  Requested for: 35YRW0975; Last   Rx:28Jan2016 Ordered   7  Viagra 100 MG Oral Tablet; as directed; Therapy: 49AWH8718 to (EOBQXZPJ:15MSD8681)  Requested for: 42KFL9579; Last   Rx:19Jan2015 Ordered    The medication list was reviewed and updated today  Allergies    1  Penicillins    2  Bee sting    Vitals   Recorded: 75Djl3547 03:35PM   Temperature 97 3 F   Heart Rate 72   Respiration 18   Systolic 230   Diastolic 86   Height 5 ft 7 in   Weight 186 lb    BMI Calculated 29 13   BSA Calculated 1 96     Physical Exam    Constitutional   General appearance: No acute distress, well appearing and well nourished  Pulmonary   Auscultation of lungs: Clear to auscultation, equal breath sounds bilaterally, no wheezes, no rales, no rhonci  Cardiovascular   Auscultation of heart: Normal rate and rhythm, normal S1 and S2, without murmurs  Skin   Skin and subcutaneous tissue: Abnormal   (R lower thigh with 3 cm raised, red area with lesser erythema surrounding   No discharge or fluctuance  )        Signatures   Electronically signed by : BURTON Robles ; Feb  3 2016  3:52PM EST                       (Author)

## 2018-01-11 NOTE — RESULT NOTES
Discussion/Summary   see task     Verified Results  * XR FEMUR 2 VIEW LEFT 80Lbt0941 07:50AM Moe Cruz     Test Name Result Flag Reference   XR FEMUR 2 VW LEFT (Report)     LEFT FEMUR     INDICATION: Skin ulceration of the distal femur  COMPARISON: None     VIEWS: AP and lateral;     IMAGES: 7     FINDINGS:     There is no acute fracture or dislocation  There are degenerative changes at the knee  No lytic or blastic lesions are seen  No periosteal reaction or osseous destruction  There are atherosclerotic calcifications  There is an addition soft tissue swelling at the level of the lower extremity stump at the site of amputation  No subcutaneous emphysema or retained radiopaque foreign body  IMPRESSION:     No acute osseous abnormality  Soft tissue swelling at the level of the lower extremity stump status post BKA         Workstation performed: [id number]     Signed by:   Afsaneh Rodarte MD   7/21/17

## 2018-01-12 VITALS
TEMPERATURE: 98.2 F | HEART RATE: 84 BPM | RESPIRATION RATE: 16 BRPM | HEIGHT: 67 IN | SYSTOLIC BLOOD PRESSURE: 142 MMHG | DIASTOLIC BLOOD PRESSURE: 90 MMHG | WEIGHT: 188.25 LBS | BODY MASS INDEX: 29.55 KG/M2

## 2018-01-12 NOTE — RESULT NOTES
Verified Results  (1) LIPID PANEL, FASTING [id number] 08:44AM [employer]     Test Name Result Flag Reference   Cholesterol, Total 263 mg/dL H 100-199   Triglycerides 194 mg/dL H 0-149   HDL Cholesterol 50 mg/dL  >39   VLDL Cholesterol Nir 39 mg/dL  5-40   LDL Cholesterol Calc 174 mg/dL H 0-99   T  Chol/HDL Ratio 5 3 ratio units H 0 0-5 0   T  Chol/HDL Ratio                                                             Men  Women                                               1/2 Avg  Risk  3 4    3 3                                                   Avg Risk  5 0    4 4                                                2X Avg  Risk  9 6    7 1                                                3X Avg  Risk 23 4   11 0     (1) CBC/PLT/DIFF 83KTX4466 08:44AM Shirin Butts     Test Name Result Flag Reference   WBC 4 9 x10E3/uL  3 4-10 8   RBC 4 69 x10E6/uL  4 14-5 80   Hemoglobin 14 5 g/dL  12 6-17 7   Hematocrit 42 5 %  37 5-51 0   MCV 91 fL  79-97   MCH 30 9 pg  26 6-33 0   Baso (Absolute) 0 0 x10E3/uL  0 0-0 2   Immature Granulocytes 0 %     Immature Grans (Abs) 0 0 x10E3/uL  0 0-0 1   Eos 4 %     Basos 0 %     Neutrophils (Absolute) 3 0 x10E3/uL  1 4-7 0   Lymphs (Absolute) 1 3 x10E3/uL  0 7-3 1   Monocytes(Absolute) 0 3 x10E3/uL  0 1-0 9   Eos (Absolute) 0 2 x10E3/uL  0 0-0 4   MCHC 34 1 g/dL  31 5-35 7   RDW 13 3 %  12 3-15 4   Platelets 227 B61H6/XY  150-379   Neutrophils 62 %     Lymphs 27 %     Monocytes 7 %       (1) COMPREHENSIVE METABOLIC PANEL 40XFL0667 42:37WI Hotchkin, Nasir Dariusz     Test Name Result Flag Reference   Glucose, Serum 147 mg/dL H 65-99   BUN 16 mg/dL  6-24   Creatinine, Serum 0 82 mg/dL  0 76-1 27   eGFR If NonAfricn Am 101 mL/min/1 73  >59   eGFR If Africn Am 117 mL/min/1 73  >59   BUN/Creatinine Ratio 20  9-20   ALT (SGPT) 31 IU/L  0-44   Albumin, Serum 4 0 g/dL  3 5-5 5   Globulin, Total 2 5 g/dL  1 5-4 5   A/G Ratio 1 6  1 1-2 5   **Effective March 13, 2017 the reference interval** for A/G Ratio will be changing to: Age                Male          Female                           0 -  7 days       1 1 - 2 3       1 1 - 2 3                           8 - 30 days       1 2 - 2 8       1 2 - 2 8                           1 -  6 months     1 3 - 3 6       1 3 - 3 6                    7 months -  5 years      1 5 - 2 6       1 5 - 2 6                              > 5 years      1 2 - 2 2       1 2 - 2 2   Bilirubin, Total 0 3 mg/dL  0 0-1 2   Alkaline Phosphatase, S 84 IU/L     AST (SGOT) 22 IU/L  0-40   Sodium, Serum 140 mmol/L  134-144   Potassium, Serum 4 9 mmol/L  3 5-5 2   Chloride, Serum 99 mmol/L     Carbon Dioxide, Total 23 mmol/L  18-29   Calcium, Serum 8 9 mg/dL  8 7-10 2   Protein, Total, Serum 6 5 g/dL  6 0-8 5     (1) HEMOGLOBIN A1C [id number] 08:44AM Karmen Stern     Test Name Result Flag Reference   Hemoglobin A1c 7 7 % H 4 8-5 6   Pre-diabetes: 5 7 - 6 4           Diabetes: >6 4           Glycemic control for adults with diabetes: <7 0     [facility]) Cardiovascular Risk Assessment 22LXR4347 08:44AM Mainor Stern     Test Name Result Flag Reference   Interpretation Note     Supplement report is available  PDF Image

## 2018-01-13 VITALS
TEMPERATURE: 97.7 F | HEART RATE: 80 BPM | HEIGHT: 67 IN | WEIGHT: 181 LBS | DIASTOLIC BLOOD PRESSURE: 80 MMHG | BODY MASS INDEX: 28.41 KG/M2 | SYSTOLIC BLOOD PRESSURE: 126 MMHG | RESPIRATION RATE: 20 BRPM

## 2018-01-14 VITALS
DIASTOLIC BLOOD PRESSURE: 80 MMHG | TEMPERATURE: 97.1 F | RESPIRATION RATE: 18 BRPM | BODY MASS INDEX: 27.47 KG/M2 | HEIGHT: 67 IN | SYSTOLIC BLOOD PRESSURE: 146 MMHG | HEART RATE: 86 BPM | WEIGHT: 175 LBS

## 2018-01-15 VITALS
BODY MASS INDEX: 28.25 KG/M2 | RESPIRATION RATE: 20 BRPM | DIASTOLIC BLOOD PRESSURE: 76 MMHG | WEIGHT: 180 LBS | HEIGHT: 67 IN | SYSTOLIC BLOOD PRESSURE: 132 MMHG | TEMPERATURE: 97.8 F | HEART RATE: 76 BPM

## 2018-01-15 VITALS
RESPIRATION RATE: 16 BRPM | DIASTOLIC BLOOD PRESSURE: 90 MMHG | BODY MASS INDEX: 28.5 KG/M2 | WEIGHT: 182 LBS | SYSTOLIC BLOOD PRESSURE: 130 MMHG | TEMPERATURE: 97.4 F | HEART RATE: 80 BPM

## 2018-01-15 NOTE — RESULT NOTES
Verified Results  [facility]) CBC/Diff Ambiguous Default 26XDN1682 07:31AM Vida Niño     Test Name Result Flag Reference   WBC 3 7 x10E3/uL  3 4-10 8   RBC 4 52 x10E6/uL  4 14-5 80   Hemoglobin 13 6 g/dL  12 6-17 7   Hematocrit 40 5 %  37 5-51 0   MCV 90 fL  79-97   MCH 30 1 pg  26 6-33 0   MCHC 33 6 g/dL  31 5-35 7   RDW 13 2 %  12 3-15 4   Platelets 928 X51Q3/SF  150-379   Neutrophils 55 %     Lymphs 27 %     Monocytes 12 %     Eos 5 %     Basos 1 %     Neutrophils (Absolute) 2 1 x10E3/uL  1 4-7 0   Lymphs (Absolute) 1 0 x10E3/uL  0 7-3 1   Monocytes(Absolute) 0 4 x10E3/uL  0 1-0 9   Eos (Absolute) 0 2 x10E3/uL  0 0-0 4   Baso (Absolute) 0 0 x10E3/uL  0 0-0 2   Immature Granulocytes 0 %     Immature Grans (Abs) 0 0 x10E3/uL  0 0-0 1     (LC) Comp   Metabolic Panel (13) [id number] 07:31AM Oneil Stern     Test Name Result Flag Reference   Glucose, Serum 159 mg/dL H 65-99   BUN 18 mg/dL  6-24   Creatinine, Serum 0 75 mg/dL L 0 76-1 27   eGFR If NonAfricn Am 105 mL/min/1 73  >59   eGFR If Africn Am 122 mL/min/1 73  >59   BUN/Creatinine Ratio 24 H 9-20   Sodium, Serum 140 mmol/L  134-144   Potassium, Serum 4 6 mmol/L  3 5-5 2   Chloride, Serum 102 mmol/L     Carbon Dioxide, Total 22 mmol/L  18-29   Calcium, Serum 8 7 mg/dL  8 7-10 2   Protein, Total, Serum 6 4 g/dL  6 0-8 5   Albumin, Serum 4 1 g/dL  3 5-5 5   Globulin, Total 2 3 g/dL  1 5-4 5   A/G Ratio 1 8  1 1-2 5   Bilirubin, Total 0 4 mg/dL  0 0-1 2   Alkaline Phosphatase, S 79 IU/L     AST (SGOT) 20 IU/L  0-40     (LC) Hemoglobin A1c 26Mar2016 07:31AM Oneil Stern     Test Name Result Flag Reference   Hemoglobin A1c 7 6 %Hb H    Reference Range:  American Diabetes Association (ADA) Guidelines:  <5 7: Decreased risk for diabetes  5 7 - 6 4: Increased risk for diabetes  >6 4: Ongoing Hyperglycemia of any cause  <7 0: Glycemic control for adults with diabetes   Estimated Average Glucose 171 mg/dL       () Lipid Panel [id number] 07:31AM Jarred Marely Wall     Test Name Result Flag Reference   Cholesterol, Total 195 mg/dL  100-199   Triglycerides 139 mg/dL  0-149   HDL Cholesterol 55 mg/dL  >39   According to ATP-III Guidelines, HDL-C >59 mg/dL is considered a  negative risk factor for CHD  VLDL Cholesterol Nir 28 mg/dL  5-40   LDL Cholesterol Calc 112 mg/dL H 0-99     [facility]) Cardiovascular Risk Assessment 26Mar2016 07:31AM Marely Stern     Test Name Result Flag Reference   Interpretation Note     Supplement report is available  PDF Image

## 2018-01-16 NOTE — RESULT NOTES
Discussion/Summary   Sugar and cholesterol are high  Please make an appointment to discuss your results  - Dr Keya Marcus     Verified Results  (1) CBC/PLT/DIFF [id number] 09:50AM Svitlana John     Test Name Result Flag Reference   WBC 3 9 x10E3/uL  3 4-10 8   RBC 4 54 x10E6/uL  4 14-5 80   Hemoglobin 14 0 g/dL  12 6-17 7   Hematocrit 41 9 %  37 5-51 0   MCV 92 fL  79-97   MCH 30 8 pg  26 6-33 0   MCHC 33 4 g/dL  31 5-35 7   RDW 12 9 %  12 3-15 4   Platelets 827 [id number]/YQ  150-379   Neutrophils 58 %  Not Estab  Lymphs 27 %  Not Estab  Monocytes 9 %  Not Estab  Eos 5 %  Not Estab  Basos 1 %  Not Estab  Neutrophils (Absolute) 2 3 x10E3/uL  1 4-7 0   Lymphs (Absolute) 1 1 x10E3/uL  0 7-3 1   Monocytes(Absolute) 0 3 x10E3/uL  0 1-0 9   Eos (Absolute) 0 2 x10E3/uL  0 0-0 4   Baso (Absolute) 0 0 x10E3/uL  0 0-0 2   Immature Granulocytes 0 %  Not Estab     Immature Grans (Abs) 0 0 x10E3/uL  0 0-0 1     (1) COMPREHENSIVE METABOLIC PANEL [id number] 54:90JV Yoav Stern     Test Name Result Flag Reference   Glucose, Serum 203 mg/dL H 65-99   BUN 16 mg/dL  6-24   Creatinine, Serum 0 91 mg/dL  0 76-1 27   BUN/Creatinine Ratio 18  9-20   Sodium, Serum 140 mmol/L  134-144   Potassium, Serum 4 9 mmol/L  3 5-5 2   Chloride, Serum 101 mmol/L     Carbon Dioxide, Total 24 mmol/L  18-29   Calcium, Serum 9 1 mg/dL  8 7-10 2   Protein, Total, Serum 6 8 g/dL  6 0-8 5   Albumin, Serum 4 3 g/dL  3 5-5 5   Globulin, Total 2 5 g/dL  1 5-4 5   A/G Ratio 1 7  1 2-2 2   Bilirubin, Total 0 4 mg/dL  0 0-1 2   Alkaline Phosphatase, S 90 IU/L     AST (SGOT) 21 IU/L  0-40   ALT (SGPT) 24 IU/L  0-44   eGFR If NonAfricn Am 95 mL/min/1 73  >59   eGFR If Africn Am 110 mL/min/1 73  >59     (1) LIPID PANEL, FASTING 30Oct2017 09:50AM Yoav Stern     Test Name Result Flag Reference   Cholesterol, Total 208 mg/dL H 100-199   Triglycerides 155 mg/dL H 0-149   HDL Cholesterol 53 mg/dL  >39   VLDL Cholesterol Nir 31 mg/dL  5-40   LDL Cholesterol Calc 124 mg/dL H 0-99   T  Chol/HDL Ratio 3 9 ratio units  0 0-5 0   T  Chol/HDL Ratio                                                             Men  Women                                               1/2 Avg  Risk  3 4    3 3                                                   Avg Risk  5 0    4 4                                                2X Avg  Risk  9 6    7 1                                                3X Avg  Risk 23 4   11 0     (1) HEMOGLOBIN A1C 30Oct2017 09:50AM Blaire Stern     Test Name Result Flag Reference   Hemoglobin A1c 8 4 % H 4 8-5 6   Pre-diabetes: 5 7 - 6 4           Diabetes: >6 4           Glycemic control for adults with diabetes: <7 0     [facility]) Cardiovascular Risk Assessment 30Oct2017 09:50AM Blaire Stern     Test Name Result Flag Reference   Interpretation Note     Supplemental report is available  PDF Image

## 2018-02-05 DIAGNOSIS — E78.2 MIXED HYPERLIPIDEMIA: Primary | ICD-10-CM

## 2018-02-05 RX ORDER — ATORVASTATIN CALCIUM 40 MG/1
TABLET, FILM COATED ORAL
Qty: 90 TABLET | Refills: 3 | Status: SHIPPED | OUTPATIENT
Start: 2018-02-05 | End: 2019-10-03 | Stop reason: ALTCHOICE

## 2018-04-29 DIAGNOSIS — I10 BENIGN ESSENTIAL HYPERTENSION: Primary | ICD-10-CM

## 2018-04-29 RX ORDER — AMLODIPINE BESYLATE 2.5 MG/1
TABLET ORAL
Qty: 90 TABLET | Refills: 0 | Status: SHIPPED | OUTPATIENT
Start: 2018-04-29 | End: 2018-08-25 | Stop reason: SDUPTHER

## 2018-05-03 ENCOUNTER — TRANSCRIBE ORDERS (OUTPATIENT)
Dept: ADMINISTRATIVE | Age: 53
End: 2018-05-03

## 2018-05-03 ENCOUNTER — APPOINTMENT (OUTPATIENT)
Dept: LAB | Age: 53
End: 2018-05-03
Attending: PREVENTIVE MEDICINE
Payer: COMMERCIAL

## 2018-05-03 ENCOUNTER — APPOINTMENT (OUTPATIENT)
Dept: RADIOLOGY | Age: 53
End: 2018-05-03
Attending: PREVENTIVE MEDICINE
Payer: COMMERCIAL

## 2018-05-03 DIAGNOSIS — Z02.89 ENCOUNTER FOR OCCUPATIONAL PHYSICAL EXAMINATION: ICD-10-CM

## 2018-05-03 DIAGNOSIS — Z02.89 ENCOUNTER FOR OCCUPATIONAL PHYSICAL EXAMINATION: Primary | ICD-10-CM

## 2018-05-03 LAB
BACTERIA UR QL AUTO: ABNORMAL /HPF
BASOPHILS # BLD AUTO: 0.03 THOUSANDS/ΜL (ref 0–0.1)
BASOPHILS NFR BLD AUTO: 1 % (ref 0–1)
BILIRUB UR QL STRIP: ABNORMAL
CLARITY UR: CLEAR
COLOR UR: ABNORMAL
EOSINOPHIL # BLD AUTO: 0.22 THOUSAND/ΜL (ref 0–0.61)
EOSINOPHIL NFR BLD AUTO: 4 % (ref 0–6)
ERYTHROCYTE [DISTWIDTH] IN BLOOD BY AUTOMATED COUNT: 12.7 % (ref 11.6–15.1)
GLUCOSE UR STRIP-MCNC: NEGATIVE MG/DL
HCT VFR BLD AUTO: 39.5 % (ref 36.5–49.3)
HGB BLD-MCNC: 13.9 G/DL (ref 12–17)
HGB UR QL STRIP.AUTO: NEGATIVE
KETONES UR STRIP-MCNC: NEGATIVE MG/DL
LEUKOCYTE ESTERASE UR QL STRIP: NEGATIVE
LYMPHOCYTES # BLD AUTO: 1.32 THOUSANDS/ΜL (ref 0.6–4.47)
LYMPHOCYTES NFR BLD AUTO: 22 % (ref 14–44)
MCH RBC QN AUTO: 31.4 PG (ref 26.8–34.3)
MCHC RBC AUTO-ENTMCNC: 35.2 G/DL (ref 31.4–37.4)
MCV RBC AUTO: 89 FL (ref 82–98)
MONOCYTES # BLD AUTO: 0.5 THOUSAND/ΜL (ref 0.17–1.22)
MONOCYTES NFR BLD AUTO: 8 % (ref 4–12)
NEUTROPHILS # BLD AUTO: 3.96 THOUSANDS/ΜL (ref 1.85–7.62)
NEUTS SEG NFR BLD AUTO: 65 % (ref 43–75)
NITRITE UR QL STRIP: NEGATIVE
NON-SQ EPI CELLS URNS QL MICRO: ABNORMAL /HPF
NRBC BLD AUTO-RTO: 0 /100 WBCS
PH UR STRIP.AUTO: 5.5 [PH] (ref 4.5–8)
PLATELET # BLD AUTO: 207 THOUSANDS/UL (ref 149–390)
PMV BLD AUTO: 10.4 FL (ref 8.9–12.7)
PROT UR STRIP-MCNC: ABNORMAL MG/DL
RBC # BLD AUTO: 4.43 MILLION/UL (ref 3.88–5.62)
RBC #/AREA URNS AUTO: ABNORMAL /HPF
SP GR UR STRIP.AUTO: 1.02 (ref 1–1.03)
UROBILINOGEN UR QL STRIP.AUTO: 1 E.U./DL
WBC # BLD AUTO: 6.04 THOUSAND/UL (ref 4.31–10.16)
WBC #/AREA URNS AUTO: ABNORMAL /HPF

## 2018-05-03 PROCEDURE — 86870 RBC ANTIBODY IDENTIFICATION: CPT

## 2018-05-03 PROCEDURE — 84520 ASSAY OF UREA NITROGEN: CPT

## 2018-05-03 PROCEDURE — 85025 COMPLETE CBC W/AUTO DIFF WBC: CPT

## 2018-05-03 PROCEDURE — 83825 ASSAY OF MERCURY: CPT | Performed by: PREVENTIVE MEDICINE

## 2018-05-03 PROCEDURE — 84202 ASSAY RBC PROTOPORPHYRIN: CPT

## 2018-05-03 PROCEDURE — 82300 ASSAY OF CADMIUM: CPT

## 2018-05-03 PROCEDURE — 83655 ASSAY OF LEAD: CPT | Performed by: PREVENTIVE MEDICINE

## 2018-05-03 PROCEDURE — 71045 X-RAY EXAM CHEST 1 VIEW: CPT

## 2018-05-03 PROCEDURE — 36415 COLL VENOUS BLD VENIPUNCTURE: CPT

## 2018-05-03 PROCEDURE — 82565 ASSAY OF CREATININE: CPT

## 2018-05-03 PROCEDURE — 82570 ASSAY OF URINE CREATININE: CPT | Performed by: PREVENTIVE MEDICINE

## 2018-05-03 PROCEDURE — 81001 URINALYSIS AUTO W/SCOPE: CPT | Performed by: PREVENTIVE MEDICINE

## 2018-05-03 PROCEDURE — 82232 ASSAY OF BETA-2 PROTEIN: CPT | Performed by: PREVENTIVE MEDICINE

## 2018-05-03 PROCEDURE — 82175 ASSAY OF ARSENIC: CPT | Performed by: PREVENTIVE MEDICINE

## 2018-05-04 LAB
BUN SERPL-MCNC: 25 MG/DL (ref 5–25)
CADMIUM BLD-MCNC: NORMAL UG/L (ref 0–1.2)
CREAT SERPL-MCNC: 1.13 MG/DL (ref 0.6–1.3)
GFR SERPL CREATININE-BSD FRML MDRD: 73 ML/MIN/1.73SQ M

## 2018-05-06 LAB
FEP BLD-MCNC: 26 UG/DL (ref 0–100)
LEAD BLD-MCNC: 2 UG/DL (ref 0–19)
ZPP RBC-MCNC: 29 UG/DL (ref 0–100)

## 2018-05-07 LAB — B2 MICROGLOB UR-MCNC: <10 UG/L (ref 0–300)

## 2018-05-08 LAB
ARSENIC 24H UR-MCNC: NORMAL UG/L (ref 0–50)
CREAT UR-MCNC: 2.62 G/L (ref 0.3–3)
INORG ARSENIC UR-MCNC: NORMAL UG/L (ref 0–19)
LEAD 24H UR-MCNC: NORMAL UG/L (ref 0–49)
MERCURY 24H UR-MCNC: NORMAL UG/L (ref 0–19)

## 2018-06-19 DIAGNOSIS — E11.51 DIABETES MELLITUS WITH PERIPHERAL CIRCULATORY DISORDER, CONTROLLED (HCC): Primary | ICD-10-CM

## 2018-06-19 NOTE — TELEPHONE ENCOUNTER
Refill Pen needle mini [id number] misc  Rx number [id number]    Pharmacy [facility]

## 2018-07-09 NOTE — PROGRESS NOTES
Subjective:      Patient ID: Fatou Qiu is a 52 y o  male  Chief Complaint   Patient presents with    Follow-up     medications     Diabetes       Here for diabetes follow up  Has a low grade fever, congestion, productive cough for past week  Has really bad allergies but these never went away this year  Has frontal headache, feels as though things won't drain  Has been having bad heartburn for past 6 months or more and zantac 75 was initially helping but not any longer  Worse at night with laying down  Does not eat dinner late at night as a rule  Up to date with podiatry checks  No longer having any issues or ulceration with L BKA stump  Denies hypoglycemic episodes  Due for bloodwork  Just had an eye exam a week or so ago at [place]  The following portions of the patient's history were reviewed and updated as appropriate: allergies, current medications, past family history, past medical history, past social history, past surgical history and problem list     Review of Systems   Constitutional: Positive for fatigue and fever  HENT: Positive for congestion, sinus pain and sinus pressure  Respiratory: Positive for cough  Negative for shortness of breath and wheezing  Cardiovascular: Negative  Neurological: Positive for headaches           Current Outpatient Prescriptions   Medication Sig Dispense Refill    amLODIPine (NORVASC) 2 5 mg tablet TAKE 1 TABLET DAILY 90 tablet 0    atorvastatin (LIPITOR) 40 mg tablet TAKE 1 TABLET DAILY AS DIRECTED 90 tablet 3    CHUYITA MICROLET LANCETS lancets by Does not apply route      glucose blood (CHUYITA CONTOUR TEST) test strip by In Vitro route      insulin glargine (LANTUS) 100 units/mL subcutaneous injection INJECT BY SUBCUTANEOUS ROUTE 62 UNITS EVERY DAY      Insulin Pen Needle (B-D UF III MINI PEN NEEDLES) 31G X 5 MM MISC by Does not apply route daily 100 each 0    Insulin Pen Needle 31G X 5 MM MISC BD Ultra-Fine Mini Pen Needle 31 gauge x 3/16"      lisinopril (ZESTRIL) 40 mg tablet Take 1 tablet by mouth daily      rosuvastatin (CRESTOR) 20 MG tablet Take 1 tablet by mouth daily      sildenafil (VIAGRA) 100 mg tablet Take by mouth      azithromycin (ZITHROMAX) 250 mg tablet Take 1 tablet (250 mg total) by mouth every 24 hours for 5 days With 2 pills as a loading dose day 1 6 tablet 0    esomeprazole (NexIUM) 20 mg capsule Take 1 capsule (20 mg total) by mouth daily in the early morning 90 capsule 1    insulin detemir (LEVEMIR FLEXTOUCH) 100 Units/mL injection pen Levemir FlexTouch U-100 Insulin 100 unit/mL (3 mL) subcutaneous pen      insulin glargine (LANTUS SOLOSTAR) 100 units/mL injection pen Inject under the skin      promethazine-dextromethorphan (PHENERGAN-DM) 6 25-15 mg/5 mL oral syrup promethazine-DM 6 25 mg-15 mg/5 mL syrup      promethazine-dextromethorphan (PHENERGAN-DM) 6 25-15 mg/5 mL oral syrup Take 5 mL by mouth 4 (four) times a day as needed for cough 118 mL 0     No current facility-administered medications for this visit  Objective:    /78   Pulse 82   Temp 99 5 °F (37 5 °C)   Resp 16   Ht 5' 7" (1 702 m)   Wt 84 8 kg (187 lb)   BMI 29 29 kg/m²        Physical Exam   Constitutional: He appears well-developed and well-nourished  HENT:   Right Ear: Tympanic membrane is retracted  Left Ear: Tympanic membrane is retracted  Nose: Mucosal edema present  Mouth/Throat: Oropharynx is clear and moist    Nasal mucosae erythematous, has frontal sinus tenderness to percussion   Eyes: Conjunctivae are normal    Neck: Neck supple  No JVD present  No thyromegaly present  Cardiovascular: Normal rate, regular rhythm, normal heart sounds and intact distal pulses  Exam reveals no gallop and no friction rub  No murmur heard  Pulmonary/Chest: Effort normal and breath sounds normal  He has no wheezes  He has no rales  Abdominal: Soft  Bowel sounds are normal  He exhibits no distension   There is no tenderness  Musculoskeletal: He exhibits no edema  Assessment/Plan:    Diabetes mellitus with peripheral circulatory disorder, controlled ([facility])  Lab Results   Component Value Date    HGBA1C 8 4 (H) 10/30/2017       No results for input(s): POCGLU in the last 72 hours  Blood Sugar Average: Last 72 hrs: For now will continue the same dose of his medications but he is overdue for diabetic bloodwork  This was ordered  Advised on diet and exercise  Benign essential hypertension  Stable on lisinopril and amlodipine  Hyperlipidemia  Will continue on rosuvastatin for risk reduction, check labs for lipid levels and LFT's  Amputated below knee ([facility] 75 )  No further ulcerations or issues  Diagnoses and all orders for this visit:    Diabetes mellitus with peripheral circulatory disorder, controlled ([place] [address] )  -     Comprehensive metabolic panel; Future  -     Lipid panel; Future  -     HEMOGLOBIN A1C W/ EAG ESTIMATION; Future  -     Comprehensive metabolic panel  -     Lipid panel  -     HEMOGLOBIN A1C W/ EAG ESTIMATION    Benign essential hypertension    Hyperlipidemia, unspecified hyperlipidemia type    Gastroesophageal reflux disease without esophagitis  -     esomeprazole (NexIUM) 20 mg capsule; Take 1 capsule (20 mg total) by mouth daily in the early morning    Acute non-recurrent frontal sinusitis  Comments:  Rx as above, advised saline NS and call if not improving 2-3 days  Orders:  -     azithromycin (ZITHROMAX) 250 mg tablet; Take 1 tablet (250 mg total) by mouth every 24 hours for 5 days With 2 pills as a loading dose day 1  -     promethazine-dextromethorphan (PHENERGAN-DM) 6 25-15 mg/5 mL oral syrup; Take 5 mL by mouth 4 (four) times a day as needed for cough    Status post below knee amputation of left lower extremity ([facility] )          Return in about 4 months (around 11/12/2018)         Jase Felder MD

## 2018-07-12 ENCOUNTER — OFFICE VISIT (OUTPATIENT)
Dept: FAMILY MEDICINE CLINIC | Facility: CLINIC | Age: 53
End: 2018-07-12
Payer: COMMERCIAL

## 2018-07-12 VITALS
WEIGHT: 187 LBS | RESPIRATION RATE: 16 BRPM | SYSTOLIC BLOOD PRESSURE: 130 MMHG | TEMPERATURE: 99.5 F | HEART RATE: 82 BPM | HEIGHT: 67 IN | BODY MASS INDEX: 29.35 KG/M2 | DIASTOLIC BLOOD PRESSURE: 78 MMHG

## 2018-07-12 DIAGNOSIS — Z89.512 STATUS POST BELOW KNEE AMPUTATION OF LEFT LOWER EXTREMITY: ICD-10-CM

## 2018-07-12 DIAGNOSIS — J01.10 ACUTE NON-RECURRENT FRONTAL SINUSITIS: ICD-10-CM

## 2018-07-12 DIAGNOSIS — K21.9 GASTROESOPHAGEAL REFLUX DISEASE WITHOUT ESOPHAGITIS: ICD-10-CM

## 2018-07-12 DIAGNOSIS — E11.51 DIABETES MELLITUS WITH PERIPHERAL CIRCULATORY DISORDER, CONTROLLED (HCC): Primary | ICD-10-CM

## 2018-07-12 DIAGNOSIS — E78.5 HYPERLIPIDEMIA, UNSPECIFIED HYPERLIPIDEMIA TYPE: ICD-10-CM

## 2018-07-12 DIAGNOSIS — I10 BENIGN ESSENTIAL HYPERTENSION: ICD-10-CM

## 2018-07-12 PROCEDURE — 3078F DIAST BP <80 MM HG: CPT | Performed by: INTERNAL MEDICINE

## 2018-07-12 PROCEDURE — 99214 OFFICE O/P EST MOD 30 MIN: CPT | Performed by: INTERNAL MEDICINE

## 2018-07-12 PROCEDURE — 3075F SYST BP GE 130 - 139MM HG: CPT | Performed by: INTERNAL MEDICINE

## 2018-07-12 RX ORDER — DEXTROMETHORPHAN HYDROBROMIDE AND PROMETHAZINE HYDROCHLORIDE 15; 6.25 MG/5ML; MG/5ML
5 SYRUP ORAL 4 TIMES DAILY PRN
Qty: 118 ML | Refills: 0 | Status: SHIPPED | OUTPATIENT
Start: 2018-07-12 | End: 2018-12-10 | Stop reason: ALTCHOICE

## 2018-07-12 RX ORDER — INSULIN GLARGINE 100 [IU]/ML
INJECTION, SOLUTION SUBCUTANEOUS
COMMUNITY
End: 2018-12-07 | Stop reason: SDUPTHER

## 2018-07-12 RX ORDER — DEXTROMETHORPHAN HYDROBROMIDE AND PROMETHAZINE HYDROCHLORIDE 15; 6.25 MG/5ML; MG/5ML
SYRUP ORAL
COMMUNITY
End: 2018-12-10 | Stop reason: ALTCHOICE

## 2018-07-12 RX ORDER — LISINOPRIL 40 MG/1
1 TABLET ORAL DAILY
COMMUNITY
Start: 2013-02-14 | End: 2018-07-21 | Stop reason: SDUPTHER

## 2018-07-12 RX ORDER — ROSUVASTATIN CALCIUM 20 MG/1
1 TABLET, COATED ORAL DAILY
COMMUNITY
Start: 2017-11-22 | End: 2018-11-12 | Stop reason: SDUPTHER

## 2018-07-12 RX ORDER — SILDENAFIL 100 MG/1
TABLET, FILM COATED ORAL
COMMUNITY
Start: 2014-07-02 | End: 2022-02-23 | Stop reason: HOSPADM

## 2018-07-12 RX ORDER — AZITHROMYCIN 250 MG/1
250 TABLET, FILM COATED ORAL EVERY 24 HOURS
Qty: 6 TABLET | Refills: 0 | Status: SHIPPED | OUTPATIENT
Start: 2018-07-12 | End: 2018-07-17

## 2018-07-12 NOTE — ASSESSMENT & PLAN NOTE
Lab Results   Component Value Date    HGBA1C 8 4 (H) 10/30/2017       No results for input(s): POCGLU in the last 72 hours  Blood Sugar Average: Last 72 hrs: For now will continue the same dose of his medications but he is overdue for diabetic bloodwork  This was ordered  Advised on diet and exercise

## 2018-07-21 DIAGNOSIS — I10 BENIGN ESSENTIAL HYPERTENSION: Primary | ICD-10-CM

## 2018-07-21 PROCEDURE — 4010F ACE/ARB THERAPY RXD/TAKEN: CPT | Performed by: INTERNAL MEDICINE

## 2018-07-21 RX ORDER — LISINOPRIL 40 MG/1
TABLET ORAL
Qty: 90 TABLET | Refills: 3 | Status: SHIPPED | OUTPATIENT
Start: 2018-07-21 | End: 2019-07-25 | Stop reason: SDUPTHER

## 2018-08-02 ENCOUNTER — OFFICE VISIT (OUTPATIENT)
Dept: FAMILY MEDICINE CLINIC | Facility: CLINIC | Age: 53
End: 2018-08-02
Payer: COMMERCIAL

## 2018-08-02 VITALS
HEART RATE: 80 BPM | WEIGHT: 188 LBS | DIASTOLIC BLOOD PRESSURE: 82 MMHG | RESPIRATION RATE: 18 BRPM | TEMPERATURE: 99.9 F | SYSTOLIC BLOOD PRESSURE: 158 MMHG | BODY MASS INDEX: 29.51 KG/M2 | HEIGHT: 67 IN

## 2018-08-02 DIAGNOSIS — B09 VIRAL EXANTHEM: Primary | ICD-10-CM

## 2018-08-02 PROCEDURE — 1036F TOBACCO NON-USER: CPT | Performed by: INTERNAL MEDICINE

## 2018-08-02 PROCEDURE — 3008F BODY MASS INDEX DOCD: CPT | Performed by: INTERNAL MEDICINE

## 2018-08-02 PROCEDURE — 99213 OFFICE O/P EST LOW 20 MIN: CPT | Performed by: INTERNAL MEDICINE

## 2018-08-02 NOTE — PROGRESS NOTES
Subjective:      Patient ID: Eleuterio Francois is a 52 y o  male  Chief Complaint   Patient presents with    Rash      pt states woke up with red spots  on both arms scalp, and chest af/rma        Woke this morning with a diffuse rash on arms, very itchy  Now notices on his chest and top of head  Has a low grade temp and has been congested for about a week  No new medications, soap, detergent, skin creams  No tick bites  Denies joint pain or headache  The following portions of the patient's history were reviewed and updated as appropriate: allergies, current medications, past family history, past medical history, past social history, past surgical history and problem list     Review of Systems   Constitutional: Positive for fever  HENT: Positive for congestion  Respiratory: Negative  Cardiovascular: Negative  Skin: Positive for rash           Current Outpatient Prescriptions   Medication Sig Dispense Refill    amLODIPine (NORVASC) 2 5 mg tablet TAKE 1 TABLET DAILY 90 tablet 0    atorvastatin (LIPITOR) 40 mg tablet TAKE 1 TABLET DAILY AS DIRECTED 90 tablet 3    CHUYITA MICROLET LANCETS lancets by Does not apply route      esomeprazole (NexIUM) 20 mg capsule Take 1 capsule (20 mg total) by mouth daily in the early morning 90 capsule 1    glucose blood ( CONTOUR TEST) test strip by In Vitro route      insulin glargine (LANTUS) 100 units/mL subcutaneous injection INJECT BY SUBCUTANEOUS ROUTE 62 UNITS EVERY DAY      Insulin Pen Needle (B-D UF III MINI PEN NEEDLES) 31G X 5 MM MISC by Does not apply route daily 100 each 0    Insulin Pen Needle 31G X 5 MM MISC BD Ultra-Fine Mini Pen Needle 31 gauge x 3/16"      lisinopril (ZESTRIL) 40 mg tablet TAKE 1 TABLET DAILY 90 tablet 3    sildenafil (VIAGRA) 100 mg tablet Take by mouth      insulin glargine (LANTUS SOLOSTAR) 100 units/mL injection pen Inject under the skin      promethazine-dextromethorphan (PHENERGAN-DM) 6 25-15 mg/5 mL oral syrup promethazine-DM 6 25 mg-15 mg/5 mL syrup      promethazine-dextromethorphan (PHENERGAN-DM) 6 25-15 mg/5 mL oral syrup Take 5 mL by mouth 4 (four) times a day as needed for cough 118 mL 0    rosuvastatin (CRESTOR) 20 MG tablet Take 1 tablet by mouth daily       No current facility-administered medications for this visit  Objective:    /82   Pulse 80   Temp 99 9 °F (37 7 °C)   Resp 18   Ht 5' 7" (1 702 m)   Wt 85 3 kg (188 lb)   BMI 29 44 kg/m²        Physical Exam   Constitutional: He appears well-developed and well-nourished  HENT:   Nose: Mucosal edema present  Eyes: Conjunctivae are normal    Neck: Neck supple  No JVD present  No thyromegaly present  Cardiovascular: Normal rate, regular rhythm, normal heart sounds and intact distal pulses  Exam reveals no gallop and no friction rub  No murmur heard  Pulmonary/Chest: Effort normal and breath sounds normal  He has no wheezes  He has no rales  Musculoskeletal: He exhibits no edema  Skin:   Diffuse maculopapular eruption, more severe on arms but also on chest and top of head         Assessment/Plan:    No problem-specific Assessment & Plan notes found for this encounter  Diagnoses and all orders for this visit:    Viral exanthem  Comments:  Suspect viral exanthem, conservative measures but will check bloodwork  He will call with any worsening symptoms  Orders:  -     CBC and differential; Future  -     Sedimentation rate, automated; Future  -     [place] Screen w/ Reflex to Titer/Pattern; Future  -     Varicella zoster antibody, IgM; Future  -     CBC and differential  -     Sedimentation rate, automated  -     Varicella zoster antibody, IgM          Return if symptoms worsen or fail to improve         Meagan Fonseca MD

## 2018-08-04 LAB
ANA TITR SER IF: NEGATIVE {TITER}
BASOPHILS # BLD AUTO: 0 X10E3/UL (ref 0–0.2)
BASOPHILS NFR BLD AUTO: 0 %
EOSINOPHIL # BLD AUTO: 0.1 X10E3/UL (ref 0–0.4)
EOSINOPHIL NFR BLD AUTO: 1 %
ERYTHROCYTE [DISTWIDTH] IN BLOOD BY AUTOMATED COUNT: 13.1 % (ref 12.3–15.4)
ERYTHROCYTE [SEDIMENTATION RATE] IN BLOOD BY WESTERGREN METHOD: 45 MM/HR (ref 0–30)
HCT VFR BLD AUTO: 34.5 % (ref 37.5–51)
HGB BLD-MCNC: 12.2 G/DL (ref 13–17.7)
IMM GRANULOCYTES # BLD: 0 X10E3/UL (ref 0–0.1)
IMM GRANULOCYTES NFR BLD: 0 %
LYMPHOCYTES # BLD AUTO: 0.7 X10E3/UL (ref 0.7–3.1)
LYMPHOCYTES NFR BLD AUTO: 14 %
MCH RBC QN AUTO: 30.8 PG (ref 26.6–33)
MCHC RBC AUTO-ENTMCNC: 35.4 G/DL (ref 31.5–35.7)
MCV RBC AUTO: 87 FL (ref 79–97)
MONOCYTES # BLD AUTO: 0.5 X10E3/UL (ref 0.1–0.9)
MONOCYTES NFR BLD AUTO: 9 %
NEUTROPHILS # BLD AUTO: 4.1 X10E3/UL (ref 1.4–7)
NEUTROPHILS NFR BLD AUTO: 76 %
PLATELET # BLD AUTO: 167 X10E3/UL (ref 150–379)
RBC # BLD AUTO: 3.96 X10E6/UL (ref 4.14–5.8)
VZV IGM SER IA-ACNC: <0.91 INDEX (ref 0–0.9)
WBC # BLD AUTO: 5.4 X10E3/UL (ref 3.4–10.8)

## 2018-08-06 ENCOUNTER — TELEPHONE (OUTPATIENT)
Dept: FAMILY MEDICINE CLINIC | Facility: CLINIC | Age: 53
End: 2018-08-06

## 2018-08-06 NOTE — TELEPHONE ENCOUNTER
Bj Villegas looking for results    [phone]      Please call wife, Lucinaiqradonta Jean Baptiste regarding lab results

## 2018-08-25 DIAGNOSIS — I10 BENIGN ESSENTIAL HYPERTENSION: ICD-10-CM

## 2018-08-26 RX ORDER — AMLODIPINE BESYLATE 2.5 MG/1
TABLET ORAL
Qty: 90 TABLET | Refills: 3 | Status: SHIPPED | OUTPATIENT
Start: 2018-08-26 | End: 2019-08-31 | Stop reason: SDUPTHER

## 2018-10-13 DIAGNOSIS — E11.51 DIABETES MELLITUS WITH PERIPHERAL CIRCULATORY DISORDER, CONTROLLED (HCC): Primary | ICD-10-CM

## 2018-10-14 RX ORDER — INSULIN GLARGINE 100 [IU]/ML
INJECTION, SOLUTION SUBCUTANEOUS
Qty: 15 PEN | Refills: 3 | Status: SHIPPED | OUTPATIENT
Start: 2018-10-14 | End: 2018-12-31 | Stop reason: SDUPTHER

## 2018-11-12 DIAGNOSIS — E78.5 HYPERLIPIDEMIA, UNSPECIFIED HYPERLIPIDEMIA TYPE: Primary | ICD-10-CM

## 2018-11-12 RX ORDER — ROSUVASTATIN CALCIUM 20 MG/1
TABLET, COATED ORAL
Qty: 90 TABLET | Refills: 3 | Status: SHIPPED | OUTPATIENT
Start: 2018-11-12 | End: 2019-10-03 | Stop reason: SDUPTHER

## 2018-11-23 ENCOUNTER — TELEPHONE (OUTPATIENT)
Dept: FAMILY MEDICINE CLINIC | Facility: CLINIC | Age: 53
End: 2018-11-23

## 2018-11-23 DIAGNOSIS — I10 BENIGN ESSENTIAL HYPERTENSION: ICD-10-CM

## 2018-11-23 DIAGNOSIS — E11.51 DIABETES MELLITUS WITH PERIPHERAL CIRCULATORY DISORDER, CONTROLLED (HCC): Primary | ICD-10-CM

## 2018-11-23 DIAGNOSIS — E78.5 HYPERLIPIDEMIA, UNSPECIFIED HYPERLIPIDEMIA TYPE: ICD-10-CM

## 2018-11-23 NOTE — TELEPHONE ENCOUNTER
Pt has a f/u apt with Dr Marcin Belcher Dec  6th and would like a BW order prior, please write up order and call when ready for    Pt is aware she will not be back in until Monday

## 2018-12-07 ENCOUNTER — TELEPHONE (OUTPATIENT)
Dept: OTHER | Facility: OTHER | Age: 53
End: 2018-12-07

## 2018-12-07 DIAGNOSIS — E11.51 DIABETES MELLITUS WITH PERIPHERAL CIRCULATORY DISORDER, CONTROLLED (HCC): Primary | ICD-10-CM

## 2018-12-07 NOTE — TELEPHONE ENCOUNTER
Nasir Barkley 1965  CONFIDENTIALTY NOTICE: This fax transmission is intended only for the addressee  It contains information that is legally privileged,  confidential or otherwise protected from use or disclosure  If you are not the intended recipient, you are strictly prohibited from reviewing,  disclosing, copying using or disseminating any of this information or taking any action in reliance on or regarding this information  If you have  received this fax in error, please notify us immediately by telephone so that we can arrange for its return to us  Page: 1 of 2  Call Id: [id number]  Health Call  Standard Call Report  Health Call  Patient Name: Nasir Barkley  Gender: Male  : 1965  Age: 52 Y 5 M 7 D  Return Phone  Number: [phone] (Cell)  Address: [place] [address]  City/State/Zip: [facility] [address]  [facility]  [facility]ician:  [facility] Name:  Relationship To  Patient: Self  Return Phone Number: [phone] (Cell)  Presenting Problem: " I am out of insulin "  Service Type: Prescription Refills  Charged Service 1: N/A  Pharmacy Name and  Number:  Nurse Assessment  Nurse: Alexander Farrell Date/Time: 2018 6:24:52 PM  Type of assessment required:  ---Telephone Order (Meds)  For MD Signature? Date signed:  ---Yes  Date and Time of TO:  ---2018 @ 1800  Prescribing doctor:  ---Allan GAUTHIER  Medication ordered:  ---Lantus  Dose:  ---62 units  Route  ---Subcutaneous  Frequency:  ---Once Daily in AM  Amount dispensed:  ---2 vials  Refills:  Nasir Barkley 1965  CONFIDENTIALTY NOTICE: This fax transmission is intended only for the addressee  It contains information that is legally privileged,  confidential or otherwise protected from use or disclosure   If you are not the intended recipient, you are strictly prohibited from reviewing,  disclosing, copying using or disseminating any of this information or taking any action in reliance on or regarding this information  If you have  received this fax in error, please notify us immediately by telephone so that we can arrange for its return to us  Page: 2 of 2  Call Id: [id number]  Nurse Assessment  ---none  Pharmacy and phone number:  ---[facility] @ [phone]  Date and time prescription called to pharmacy:  ---12/07/2018 @ [phone]  Spoke to [employer] order taken and read back by RN?  ---Yes  Protocols  Protocol Title Nurse Date/Time  Disp   Time Disposition Final User  12/7/2018 5:46:39 PM Send to Refills Sima Pride  12/7/2018 6:29:55 PM Close Yes Mary Anne Darden

## 2018-12-08 NOTE — TELEPHONE ENCOUNTER
Sana Cheung 1965  CONFIDENTIALTY NOTICE: This fax transmission is intended only for the addressee  It contains information that is legally privileged,  confidential or otherwise protected from use or disclosure  If you are not the intended recipient, you are strictly prohibited from reviewing,  disclosing, copying using or disseminating any of this information or taking any action in reliance on or regarding this information  If you have  received this fax in error, please notify us immediately by telephone so that we can arrange for its return to us  Page: 1  2  Call Id: [id number]  Health Call  Standard Call Report  Health Call  Patient Name: Sana Cheung  Gender: Male  : 1965  Age: 52 Y 5 M 7 D  Return Phone  Number: [phone] (Cell)  Address: [place] [address]  City/State/Zip: [place] [address]  [facility]  [facility]ician:  [facility] Name:  Relationship To  Patient: Self  Return Phone Number: [phone] (Cell)  Presenting Problem: " I am out of insulin "  Service Type: Prescription Refills  Charged Service 1: N/A  Pharmacy Name and  Number:  Nurse Assessment  Nurse: Moe Moody Date/Time: 2018 7:45:52 PM  Type of assessment required:  ---Telephone Order (Meds)  For MD Signature? Date signed:  ---Yes  Date and Time of TO:  ---2018 @ 1945  Prescribing doctor:  ---Pramod GAUTHIER  Medication ordered:  --- Vasaglar Insulin  Dose:  ---62 units  Route  ---Subcutaneous  Frequency:  ---Once Daily in AM  Amount dispensed:  --- 90 day Supply   Refills: None  Sana Cheung 1965  CONFIDENTIALTY NOTICE: This fax transmission is intended only for the addressee  It contains information that is legally privileged,  confidential or otherwise protected from use or disclosure   If you are not the intended recipient, you are strictly prohibited from reviewing,  disclosing, copying using or disseminating any of this information or taking any action in reliance on or regarding this information  If you have  received this fax in error, please notify us immediately by telephone so that we can arrange for its return to us  Page: 2 of 2  Call Id: [id number]  Nurse Assessment  ---none  Pharmacy and phone number:  ---[facility] @ [phone]  Date and time prescription called to pharmacy:  ---12/07/2018 @ 3786  Spoke to [employer] order taken and read back by RN?  ---Yes  Protocols  Protocol Title Nurse Date/Time  Disp   Time Disposition Final User

## 2018-12-08 NOTE — TELEPHONE ENCOUNTER
The pharmacy called stating that they need a 90 day supply called in  Gonzales Anderson approved the 90 day supply of Lantus  Pharmacy notified

## 2018-12-09 RX ORDER — INSULIN GLARGINE 100 [IU]/ML
62 INJECTION, SOLUTION SUBCUTANEOUS DAILY
Qty: 10 ML | Refills: 5 | Status: SHIPPED | OUTPATIENT
Start: 2018-12-09 | End: 2019-04-01

## 2018-12-09 NOTE — PROGRESS NOTES
Subjective:      Patient ID: Mandy Yu is a 52 y o  male  Chief Complaint   Patient presents with    Follow-up     lj    Diabetes       Here for follow up of DM, hypertension, hyperlipidemia  Feels well  Was rushing around a bit today  Checks his glucose but not frequently  No hypoglycemic episodes  No issues with his stump  Denies chest pain, dyspnea, orthopnea  No neuropathic symptoms  The following portions of the patient's history were reviewed and updated as appropriate: allergies, current medications, past family history, past medical history, past social history, past surgical history and problem list     Review of Systems   Constitutional: Negative  Respiratory: Negative  Cardiovascular: Negative  Current Outpatient Prescriptions   Medication Sig Dispense Refill    amLODIPine (NORVASC) 2 5 mg tablet TAKE 1 TABLET BY MOUTH EVERY DAY   NEEDS APPT 90 tablet 3    atorvastatin (LIPITOR) 40 mg tablet TAKE 1 TABLET DAILY AS DIRECTED 90 tablet 3    CHUYITA MICROLET LANCETS lancets by Does not apply route      esomeprazole (NexIUM) 20 mg capsule Take 1 capsule (20 mg total) by mouth daily in the early morning 90 capsule 1    glucose blood (Aurora Feint CONTOUR TEST) test strip by In Vitro route      insulin glargine (LANTUS) 100 units/mL subcutaneous injection Inject 62 Units under the skin daily 10 mL 5    Insulin Pen Needle (B-D UF III MINI PEN NEEDLES) 31G X 5 MM MISC by Does not apply route daily 100 each 0    Insulin Pen Needle 31G X 5 MM MISC BD Ultra-Fine Mini Pen Needle 31 gauge x 3/16"      LANTUS SOLOSTAR 100 units/mL injection pen INJECT 54 UNITS SUBQUTANEOUSLY DAILY 15 pen 3    lisinopril (ZESTRIL) 40 mg tablet TAKE 1 TABLET DAILY 90 tablet 3    rosuvastatin (CRESTOR) 20 MG tablet TAKE 1 TABLET BY MOUTH EVERY DAY 90 tablet 3    sildenafil (VIAGRA) 100 mg tablet Take by mouth       No current facility-administered medications for this visit  Objective:    /82   Pulse 84   Temp (!) 97 2 °F (36 2 °C)   Resp 16   Ht 5' 7" (1 702 m)   Wt 84 8 kg (187 lb)   BMI 29 29 kg/m²        Physical Exam   Constitutional: He appears well-developed and well-nourished  Eyes: Conjunctivae are normal    Neck: Neck supple  No JVD present  No thyromegaly present  Cardiovascular: Normal rate, regular rhythm, normal heart sounds and intact distal pulses  Exam reveals no gallop and no friction rub  No murmur heard  Pulmonary/Chest: Effort normal and breath sounds normal  He has no wheezes  He has no rales  Abdominal: Soft  Bowel sounds are normal  He exhibits no distension  There is no tenderness  Musculoskeletal: He exhibits no edema  S/p L BKA  R without edema  Assessment/Plan:    Diabetes mellitus with peripheral circulatory disorder, controlled ([facility])  Lab Results   Component Value Date    HGBA1C 8 4 (H) 10/30/2017       No results for input(s): POCGLU in the last 72 hours  Blood Sugar Average: Last 72 hrs:     He has not yet gotten his bloodwork and is going after leaving our office  Continue same medications for now and we will call after his bloodwork results are received  Encouraged good foot and eye care  Benign essential hypertension  Stable and improved on recheck  Continue amlodipine and lisinopril  Hyperlipidemia  Again, overdue for labs and he is going today  Will follow up once results are back  Continue statin  Amputated below knee ([facility] )  Discussed good skin care  No current issues or complaints         Diagnoses and all orders for this visit:    Diabetes mellitus with peripheral circulatory disorder, controlled ([facility] )    Benign essential hypertension    Hyperlipidemia, unspecified hyperlipidemia type    Status post below knee amputation of left lower extremity ([facility] )    Needs flu shot  -     PREFERRED: influenza vaccine, [id number], quadrivalent, recombinant, PF, 0 5 mL, for patients 18 yr+ ([id number]) Return in about 4 months (around 4/10/2019)         Chapo Varela MD

## 2018-12-10 ENCOUNTER — OFFICE VISIT (OUTPATIENT)
Dept: FAMILY MEDICINE CLINIC | Facility: CLINIC | Age: 53
End: 2018-12-10
Payer: COMMERCIAL

## 2018-12-10 VITALS
DIASTOLIC BLOOD PRESSURE: 82 MMHG | SYSTOLIC BLOOD PRESSURE: 146 MMHG | TEMPERATURE: 97.2 F | RESPIRATION RATE: 16 BRPM | HEIGHT: 67 IN | HEART RATE: 84 BPM | BODY MASS INDEX: 29.35 KG/M2 | WEIGHT: 187 LBS

## 2018-12-10 DIAGNOSIS — E78.5 HYPERLIPIDEMIA, UNSPECIFIED HYPERLIPIDEMIA TYPE: ICD-10-CM

## 2018-12-10 DIAGNOSIS — Z23 NEEDS FLU SHOT: ICD-10-CM

## 2018-12-10 DIAGNOSIS — Z89.512 STATUS POST BELOW KNEE AMPUTATION OF LEFT LOWER EXTREMITY: ICD-10-CM

## 2018-12-10 DIAGNOSIS — E11.51 DIABETES MELLITUS WITH PERIPHERAL CIRCULATORY DISORDER, CONTROLLED (HCC): Primary | ICD-10-CM

## 2018-12-10 DIAGNOSIS — I10 BENIGN ESSENTIAL HYPERTENSION: ICD-10-CM

## 2018-12-10 PROCEDURE — 99214 OFFICE O/P EST MOD 30 MIN: CPT | Performed by: INTERNAL MEDICINE

## 2018-12-10 PROCEDURE — 1036F TOBACCO NON-USER: CPT | Performed by: INTERNAL MEDICINE

## 2018-12-10 PROCEDURE — 90682 RIV4 VACC RECOMBINANT DNA IM: CPT

## 2018-12-10 PROCEDURE — 90471 IMMUNIZATION ADMIN: CPT

## 2018-12-10 PROCEDURE — 3008F BODY MASS INDEX DOCD: CPT | Performed by: INTERNAL MEDICINE

## 2018-12-10 NOTE — ASSESSMENT & PLAN NOTE
Again, overdue for labs and he is going today  Will follow up once results are back  Continue statin

## 2018-12-10 NOTE — ASSESSMENT & PLAN NOTE
Lab Results   Component Value Date    HGBA1C 8 4 (H) 10/30/2017       No results for input(s): POCGLU in the last 72 hours  Blood Sugar Average: Last 72 hrs:     He has not yet gotten his bloodwork and is going after leaving our office  Continue same medications for now and we will call after his bloodwork results are received  Encouraged good foot and eye care

## 2018-12-11 ENCOUNTER — TELEPHONE (OUTPATIENT)
Dept: FAMILY MEDICINE CLINIC | Facility: CLINIC | Age: 53
End: 2018-12-11

## 2018-12-11 LAB
ALBUMIN SERPL-MCNC: 4.2 G/DL (ref 3.5–5.5)
ALBUMIN/GLOB SERPL: 1.9 {RATIO} (ref 1.2–2.2)
ALP SERPL-CCNC: 74 IU/L (ref 39–117)
ALT SERPL-CCNC: 25 IU/L (ref 0–44)
AST SERPL-CCNC: 25 IU/L (ref 0–40)
BASOPHILS # BLD AUTO: 0 X10E3/UL (ref 0–0.2)
BASOPHILS NFR BLD AUTO: 1 %
BILIRUB SERPL-MCNC: 0.3 MG/DL (ref 0–1.2)
BUN SERPL-MCNC: 19 MG/DL (ref 6–24)
BUN/CREAT SERPL: 22 (ref 9–20)
CALCIUM SERPL-MCNC: 9.2 MG/DL (ref 8.7–10.2)
CHLORIDE SERPL-SCNC: 105 MMOL/L (ref 96–106)
CHOLEST SERPL-MCNC: 186 MG/DL (ref 100–199)
CHOLEST/HDLC SERPL: 3.4 RATIO (ref 0–5)
CO2 SERPL-SCNC: 21 MMOL/L (ref 20–29)
CREAT SERPL-MCNC: 0.86 MG/DL (ref 0.76–1.27)
EOSINOPHIL # BLD AUTO: 0.2 X10E3/UL (ref 0–0.4)
EOSINOPHIL NFR BLD AUTO: 4 %
ERYTHROCYTE [DISTWIDTH] IN BLOOD BY AUTOMATED COUNT: 12.4 % (ref 12.3–15.4)
EST. AVERAGE GLUCOSE BLD GHB EST-MCNC: 192 MG/DL
GLOBULIN SER-MCNC: 2.2 G/DL (ref 1.5–4.5)
GLUCOSE SERPL-MCNC: 112 MG/DL (ref 65–99)
HBA1C MFR BLD: 8.3 % (ref 4.8–5.6)
HCT VFR BLD AUTO: 40.6 % (ref 37.5–51)
HDLC SERPL-MCNC: 55 MG/DL
HGB BLD-MCNC: 14.2 G/DL (ref 13–17.7)
IMM GRANULOCYTES # BLD: 0 X10E3/UL (ref 0–0.1)
IMM GRANULOCYTES NFR BLD: 0 %
LDLC SERPL CALC-MCNC: 105 MG/DL (ref 0–99)
LYMPHOCYTES # BLD AUTO: 1.1 X10E3/UL (ref 0.7–3.1)
LYMPHOCYTES NFR BLD AUTO: 26 %
MCH RBC QN AUTO: 31.3 PG (ref 26.6–33)
MCHC RBC AUTO-ENTMCNC: 35 G/DL (ref 31.5–35.7)
MCV RBC AUTO: 89 FL (ref 79–97)
MICRODELETION SYND BLD/T FISH: NORMAL
MONOCYTES # BLD AUTO: 0.4 X10E3/UL (ref 0.1–0.9)
MONOCYTES NFR BLD AUTO: 10 %
NEUTROPHILS # BLD AUTO: 2.5 X10E3/UL (ref 1.4–7)
NEUTROPHILS NFR BLD AUTO: 59 %
PLATELET # BLD AUTO: 208 X10E3/UL (ref 150–379)
POTASSIUM SERPL-SCNC: 4.7 MMOL/L (ref 3.5–5.2)
PROT SERPL-MCNC: 6.4 G/DL (ref 6–8.5)
RBC # BLD AUTO: 4.54 X10E6/UL (ref 4.14–5.8)
SL AMB EGFR AFRICAN AMERICAN: 113 ML/MIN/1.73
SL AMB EGFR NON AFRICAN AMERICAN: 98 ML/MIN/1.73
SL AMB VLDL CHOLESTEROL CALC: 26 MG/DL (ref 5–40)
SODIUM SERPL-SCNC: 143 MMOL/L (ref 134–144)
TRIGL SERPL-MCNC: 129 MG/DL (ref 0–149)
WBC # BLD AUTO: 4.1 X10E3/UL (ref 3.4–10.8)

## 2018-12-11 NOTE — TELEPHONE ENCOUNTER
Pharmacy called lantus  Not going thru--please change- basaglar  Is the cheapest when I spoke to pharmacy--lj

## 2018-12-11 NOTE — TELEPHONE ENCOUNTER
Spoke with Pt wife   Stated Do NOT CHANGE   Wife stated will call insurance and will call us back  Khai Schafer

## 2018-12-12 ENCOUNTER — TELEPHONE (OUTPATIENT)
Dept: FAMILY MEDICINE CLINIC | Facility: CLINIC | Age: 53
End: 2018-12-12

## 2018-12-12 DIAGNOSIS — E11.51 DIABETES MELLITUS WITH PERIPHERAL CIRCULATORY DISORDER, CONTROLLED (HCC): Primary | ICD-10-CM

## 2018-12-12 NOTE — TELEPHONE ENCOUNTER
I spoke to wife regarding patient's bloodwork as she has been present at his appointments in the past   Notified about glucose results  He will increase his lantus by 6 units and was asked to call with readings in 1 week to see if further titration is necessary

## 2018-12-31 ENCOUNTER — TELEPHONE (OUTPATIENT)
Dept: FAMILY MEDICINE CLINIC | Facility: CLINIC | Age: 53
End: 2018-12-31

## 2018-12-31 DIAGNOSIS — E11.51 DIABETES MELLITUS WITH PERIPHERAL CIRCULATORY DISORDER, CONTROLLED (HCC): ICD-10-CM

## 2018-12-31 NOTE — TELEPHONE ENCOUNTER
Wife is trying to get the lantus precertified and does not want us to change  Will wait for insurance decision

## 2018-12-31 NOTE — TELEPHONE ENCOUNTER
lantus       [facility] a [facility]    Can we call them for lantus refill    Thank you

## 2018-12-31 NOTE — TELEPHONE ENCOUNTER
Lantus is not on formulary any more  Basaglar is Please change [facility]

## 2019-01-02 DIAGNOSIS — E11.51 DIABETES MELLITUS WITH PERIPHERAL CIRCULATORY DISORDER, CONTROLLED (HCC): ICD-10-CM

## 2019-03-11 ENCOUNTER — APPOINTMENT (OUTPATIENT)
Dept: RADIOLOGY | Age: 54
End: 2019-03-11
Attending: PREVENTIVE MEDICINE

## 2019-03-11 ENCOUNTER — APPOINTMENT (OUTPATIENT)
Dept: LAB | Age: 54
End: 2019-03-11
Attending: PREVENTIVE MEDICINE

## 2019-03-11 ENCOUNTER — TRANSCRIBE ORDERS (OUTPATIENT)
Dept: ADMINISTRATIVE | Age: 54
End: 2019-03-11

## 2019-03-11 DIAGNOSIS — Z00.00 PHYSICAL EXAM: ICD-10-CM

## 2019-03-11 DIAGNOSIS — Z00.00 PHYSICAL EXAM: Primary | ICD-10-CM

## 2019-03-11 LAB
BACTERIA UR QL AUTO: ABNORMAL /HPF
BASOPHILS # BLD AUTO: 0.04 THOUSANDS/ΜL (ref 0–0.1)
BASOPHILS NFR BLD AUTO: 1 % (ref 0–1)
BILIRUB UR QL STRIP: NEGATIVE
BUN SERPL-MCNC: 16 MG/DL (ref 5–25)
CLARITY UR: CLEAR
COLOR UR: YELLOW
CREAT SERPL-MCNC: 0.78 MG/DL (ref 0.6–1.3)
EOSINOPHIL # BLD AUTO: 0.21 THOUSAND/ΜL (ref 0–0.61)
EOSINOPHIL NFR BLD AUTO: 3 % (ref 0–6)
ERYTHROCYTE [DISTWIDTH] IN BLOOD BY AUTOMATED COUNT: 12.8 % (ref 11.6–15.1)
GFR SERPL CREATININE-BSD FRML MDRD: 102 ML/MIN/1.73SQ M
GLUCOSE UR STRIP-MCNC: NEGATIVE MG/DL
HCT VFR BLD AUTO: 43.3 % (ref 36.5–49.3)
HGB BLD-MCNC: 14.2 G/DL (ref 12–17)
HGB UR QL STRIP.AUTO: NEGATIVE
HYALINE CASTS #/AREA URNS LPF: ABNORMAL /LPF
IMM GRANULOCYTES # BLD AUTO: 0.02 THOUSAND/UL (ref 0–0.2)
IMM GRANULOCYTES NFR BLD AUTO: 0 % (ref 0–2)
KETONES UR STRIP-MCNC: NEGATIVE MG/DL
LEUKOCYTE ESTERASE UR QL STRIP: NEGATIVE
LYMPHOCYTES # BLD AUTO: 1.62 THOUSANDS/ΜL (ref 0.6–4.47)
LYMPHOCYTES NFR BLD AUTO: 24 % (ref 14–44)
MCH RBC QN AUTO: 30.7 PG (ref 26.8–34.3)
MCHC RBC AUTO-ENTMCNC: 32.8 G/DL (ref 31.4–37.4)
MCV RBC AUTO: 94 FL (ref 82–98)
MONOCYTES # BLD AUTO: 0.54 THOUSAND/ΜL (ref 0.17–1.22)
MONOCYTES NFR BLD AUTO: 8 % (ref 4–12)
NEUTROPHILS # BLD AUTO: 4.25 THOUSANDS/ΜL (ref 1.85–7.62)
NEUTS SEG NFR BLD AUTO: 64 % (ref 43–75)
NITRITE UR QL STRIP: NEGATIVE
NON-SQ EPI CELLS URNS QL MICRO: ABNORMAL /HPF
NRBC BLD AUTO-RTO: 0 /100 WBCS
PH UR STRIP.AUTO: 5.5 [PH]
PLATELET # BLD AUTO: 209 THOUSANDS/UL (ref 149–390)
PMV BLD AUTO: 10.1 FL (ref 8.9–12.7)
PROT UR STRIP-MCNC: ABNORMAL MG/DL
RBC # BLD AUTO: 4.62 MILLION/UL (ref 3.88–5.62)
RBC #/AREA URNS AUTO: ABNORMAL /HPF
SP GR UR STRIP.AUTO: 1.02 (ref 1–1.03)
UROBILINOGEN UR QL STRIP.AUTO: 0.2 E.U./DL
WBC # BLD AUTO: 6.68 THOUSAND/UL (ref 4.31–10.16)
WBC #/AREA URNS AUTO: ABNORMAL /HPF

## 2019-03-11 PROCEDURE — 81001 URINALYSIS AUTO W/SCOPE: CPT | Performed by: PREVENTIVE MEDICINE

## 2019-03-11 PROCEDURE — 82565 ASSAY OF CREATININE: CPT

## 2019-03-11 PROCEDURE — 36415 COLL VENOUS BLD VENIPUNCTURE: CPT

## 2019-03-11 PROCEDURE — 85025 COMPLETE CBC W/AUTO DIFF WBC: CPT

## 2019-03-11 PROCEDURE — 82232 ASSAY OF BETA-2 PROTEIN: CPT | Performed by: PREVENTIVE MEDICINE

## 2019-03-11 PROCEDURE — 84520 ASSAY OF UREA NITROGEN: CPT

## 2019-03-11 PROCEDURE — 82300 ASSAY OF CADMIUM: CPT

## 2019-03-11 PROCEDURE — 86870 RBC ANTIBODY IDENTIFICATION: CPT

## 2019-03-11 PROCEDURE — 84202 ASSAY RBC PROTOPORPHYRIN: CPT

## 2019-03-11 PROCEDURE — 71045 X-RAY EXAM CHEST 1 VIEW: CPT

## 2019-03-11 PROCEDURE — 82175 ASSAY OF ARSENIC: CPT

## 2019-03-12 LAB — B2 MICROGLOB UR-MCNC: <10 UG/L (ref 0–300)

## 2019-03-13 LAB
CADMIUM 24H UR-MCNC: NORMAL UG/L
CADMIUM BLD-MCNC: NORMAL UG/L (ref 0–1.2)
CREAT UR-MCNC: 1.1 G/L (ref 0.3–3)
LEAD BLD-MCNC: 2 UG/DL (ref 0–4)
ZPP RBC-MCNC: 20 UG/DL (ref 0–99)

## 2019-04-01 DIAGNOSIS — E11.51 DIABETES MELLITUS WITH PERIPHERAL CIRCULATORY DISORDER, CONTROLLED (HCC): ICD-10-CM

## 2019-04-01 RX ORDER — INSULIN GLARGINE 100 [IU]/ML
62 INJECTION, SOLUTION SUBCUTANEOUS DAILY
Qty: 10 ML | Refills: 5 | OUTPATIENT
Start: 2019-04-01

## 2019-04-11 ENCOUNTER — OFFICE VISIT (OUTPATIENT)
Dept: FAMILY MEDICINE CLINIC | Facility: CLINIC | Age: 54
End: 2019-04-11
Payer: COMMERCIAL

## 2019-04-11 VITALS
HEIGHT: 67 IN | TEMPERATURE: 96.9 F | HEART RATE: 88 BPM | SYSTOLIC BLOOD PRESSURE: 136 MMHG | WEIGHT: 190 LBS | BODY MASS INDEX: 29.82 KG/M2 | DIASTOLIC BLOOD PRESSURE: 76 MMHG | RESPIRATION RATE: 16 BRPM

## 2019-04-11 DIAGNOSIS — E78.5 HYPERLIPIDEMIA, UNSPECIFIED HYPERLIPIDEMIA TYPE: ICD-10-CM

## 2019-04-11 DIAGNOSIS — E11.51 DIABETES MELLITUS WITH PERIPHERAL CIRCULATORY DISORDER, CONTROLLED (HCC): ICD-10-CM

## 2019-04-11 DIAGNOSIS — I10 BENIGN ESSENTIAL HYPERTENSION: Primary | ICD-10-CM

## 2019-04-11 DIAGNOSIS — Z11.59 ENCOUNTER FOR HEPATITIS C SCREENING TEST FOR LOW RISK PATIENT: ICD-10-CM

## 2019-04-11 DIAGNOSIS — I73.9 PERIPHERAL ARTERIAL DISEASE (HCC): ICD-10-CM

## 2019-04-11 DIAGNOSIS — Z89.512 STATUS POST BELOW KNEE AMPUTATION OF LEFT LOWER EXTREMITY: ICD-10-CM

## 2019-04-11 PROCEDURE — 3078F DIAST BP <80 MM HG: CPT | Performed by: INTERNAL MEDICINE

## 2019-04-11 PROCEDURE — 1036F TOBACCO NON-USER: CPT | Performed by: INTERNAL MEDICINE

## 2019-04-11 PROCEDURE — 3008F BODY MASS INDEX DOCD: CPT | Performed by: INTERNAL MEDICINE

## 2019-04-11 PROCEDURE — 99214 OFFICE O/P EST MOD 30 MIN: CPT | Performed by: INTERNAL MEDICINE

## 2019-04-11 PROCEDURE — 3075F SYST BP GE 130 - 139MM HG: CPT | Performed by: INTERNAL MEDICINE

## 2019-04-29 ENCOUNTER — HOSPITAL ENCOUNTER (EMERGENCY)
Facility: HOSPITAL | Age: 54
Discharge: HOME | End: 2019-04-29
Attending: EMERGENCY MEDICINE | Admitting: EMERGENCY MEDICINE

## 2019-04-29 VITALS
RESPIRATION RATE: 24 BRPM | OXYGEN SATURATION: 96 % | DIASTOLIC BLOOD PRESSURE: 82 MMHG | TEMPERATURE: 97 F | HEART RATE: 81 BPM | SYSTOLIC BLOOD PRESSURE: 143 MMHG
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
## 2019-04-29 DIAGNOSIS — M54.50 ACUTE LEFT-SIDED LOW BACK PAIN WITHOUT SCIATICA: Primary | ICD-10-CM

## 2019-04-29 PROCEDURE — 99283 EMERGENCY DEPT VISIT LOW MDM: CPT

## 2019-04-29 RX ORDER — CYCLOBENZAPRINE HCL 10 MG
10 TABLET ORAL 2 TIMES DAILY PRN
Qty: 10 TABLET | Refills: 0 | Status: SHIPPED | OUTPATIENT
Start: 2019-04-29 | End: 2019-10-03 | Stop reason: ALTCHOICE

## 2019-04-29 RX ORDER — CYCLOBENZAPRINE HCL 10 MG
10 TABLET ORAL ONCE
Status: COMPLETED | OUTPATIENT
Start: 2019-04-29 | End: 2019-04-29

## 2019-04-29 RX ADMIN — CYCLOBENZAPRINE HYDROCHLORIDE 10 MG: 10 TABLET, FILM COATED ORAL at 21:32

## 2019-04-30 ENCOUNTER — VBI (OUTPATIENT)
Dept: FAMILY MEDICINE CLINIC | Facility: CLINIC | Age: 54
End: 2019-04-30

## 2019-05-11 DIAGNOSIS — E11.51 DIABETES MELLITUS WITH PERIPHERAL CIRCULATORY DISORDER, CONTROLLED (HCC): ICD-10-CM

## 2019-05-12 RX ORDER — INSULIN GLARGINE 100 [IU]/ML
INJECTION, SOLUTION SUBCUTANEOUS
Qty: 15 PEN | Refills: 3 | Status: SHIPPED | OUTPATIENT
Start: 2019-05-12 | End: 2019-08-16 | Stop reason: SDUPTHER

## 2019-07-25 DIAGNOSIS — I10 BENIGN ESSENTIAL HYPERTENSION: ICD-10-CM

## 2019-07-25 PROCEDURE — 4010F ACE/ARB THERAPY RXD/TAKEN: CPT | Performed by: INTERNAL MEDICINE

## 2019-07-25 RX ORDER — LISINOPRIL 40 MG/1
TABLET ORAL
Qty: 90 TABLET | Refills: 3 | Status: SHIPPED | OUTPATIENT
Start: 2019-07-25 | End: 2020-03-26 | Stop reason: SDUPTHER

## 2019-08-16 ENCOUNTER — TELEPHONE (OUTPATIENT)
Dept: FAMILY MEDICINE CLINIC | Facility: CLINIC | Age: 54
End: 2019-08-16

## 2019-08-16 DIAGNOSIS — E11.51 DIABETES MELLITUS WITH PERIPHERAL CIRCULATORY DISORDER, CONTROLLED (HCC): ICD-10-CM

## 2019-08-18 RX ORDER — INSULIN GLARGINE 100 [IU]/ML
INJECTION, SOLUTION SUBCUTANEOUS
Qty: 5 PEN | Refills: 0 | Status: SHIPPED | OUTPATIENT
Start: 2019-08-18 | End: 2019-10-03 | Stop reason: SDUPTHER

## 2019-08-21 DIAGNOSIS — E11.51 DIABETES MELLITUS WITH PERIPHERAL CIRCULATORY DISORDER, CONTROLLED (HCC): ICD-10-CM

## 2019-08-31 DIAGNOSIS — I10 BENIGN ESSENTIAL HYPERTENSION: ICD-10-CM

## 2019-09-01 RX ORDER — AMLODIPINE BESYLATE 2.5 MG/1
TABLET ORAL
Qty: 30 TABLET | Refills: 0 | Status: SHIPPED | OUTPATIENT
Start: 2019-09-01 | End: 2019-09-25 | Stop reason: SDUPTHER

## 2019-09-11 ENCOUNTER — TELEPHONE (OUTPATIENT)
Dept: FAMILY MEDICINE CLINIC | Facility: CLINIC | Age: 54
End: 2019-09-11

## 2019-09-11 NOTE — TELEPHONE ENCOUNTER
Oskar Nuñez is at the Pharmacy now [facility] in [employer], he called and spoke with Orin who told him we called over his Lantus but they don't have anything please call over now  They are waiting for it

## 2019-09-19 LAB
CHOLEST SERPL-MCNC: 205 MG/DL (ref 100–199)
CHOLEST/HDLC SERPL: 3.7 RATIO (ref 0–5)
EST. AVERAGE GLUCOSE BLD GHB EST-MCNC: 163 MG/DL
HBA1C MFR BLD: 7.3 % (ref 4.8–5.6)
HCV AB S/CO SERPL IA: <0.1 S/CO RATIO (ref 0–0.9)
HDLC SERPL-MCNC: 55 MG/DL
LDLC SERPL CALC-MCNC: 115 MG/DL (ref 0–99)
MICRODELETION SYND BLD/T FISH: NORMAL
SL AMB VLDL CHOLESTEROL CALC: 35 MG/DL (ref 5–40)
TRIGL SERPL-MCNC: 174 MG/DL (ref 0–149)

## 2019-09-19 PROCEDURE — 3045F PR MOST RECENT HEMOGLOBIN A1C LEVEL 7.0-9.0%: CPT | Performed by: INTERNAL MEDICINE

## 2019-09-25 DIAGNOSIS — I10 BENIGN ESSENTIAL HYPERTENSION: ICD-10-CM

## 2019-09-25 RX ORDER — AMLODIPINE BESYLATE 2.5 MG/1
2.5 TABLET ORAL DAILY
Qty: 30 TABLET | Refills: 3 | Status: SHIPPED | OUTPATIENT
Start: 2019-09-25 | End: 2019-10-22 | Stop reason: SDUPTHER

## 2019-09-30 NOTE — PROGRESS NOTES
Subjective:      Patient ID: Stephon Murray is a 62 y o  male  Chief Complaint   Patient presents with    Follow-up     Diabetes check [id number]       Here for diabetic follow up  Feels well  Has been trying to be very active, walking 4 miles per day  No hypoglycemic episodes  Denies any issues with his stump  Recently saw Nadia Randolph for his eye exam   Denies chest pain, dyspnea, edema, palpitations  Trying to watch his diet  The following portions of the patient's history were reviewed and updated as appropriate: allergies, current medications, past family history, past medical history, past social history, past surgical history and problem list     Review of Systems   Constitutional: Negative  Respiratory: Negative  Cardiovascular: Negative  Musculoskeletal: Negative  Neurological: Negative  Current Outpatient Medications   Medication Sig Dispense Refill    amLODIPine (NORVASC) 2 5 mg tablet Take 1 tablet (2 5 mg total) by mouth daily 30 tablet 3    CHUYITA MICROLET LANCETS lancets by Does not apply route      glucose blood ([employer] CONTOUR TEST) test strip by In Vitro route      insulin glargine (LANTUS SOLOSTAR) 100 units/mL injection pen Inject 68 Units under the skin daily 20 pen 3    Insulin Pen Needle (B-D UF III MINI PEN NEEDLES) 31G X 5 MM MISC by Does not apply route daily 100 each 0    Insulin Pen Needle 31G X 5 MM MISC BD Ultra-Fine Mini Pen Needle 31 gauge x 3/16"      lisinopril (ZESTRIL) 40 mg tablet TAKE 1 TABLET DAILY 90 tablet 3    sildenafil (VIAGRA) 100 mg tablet Take by mouth      rosuvastatin (CRESTOR) 20 MG tablet Take 1 tablet (20 mg total) by mouth daily 90 tablet 3     No current facility-administered medications for this visit          Objective:    /78   Pulse 88   Temp 98 4 °F (36 9 °C)   Resp 16   Ht 5' 7" (1 702 m)   Wt 86 2 kg (190 lb)   BMI 29 76 kg/m²        Physical Exam   Constitutional: He appears well-developed and well-nourished  Eyes: Conjunctivae are normal    Neck: Neck supple  No JVD present  No thyromegaly present  Cardiovascular: Normal rate, regular rhythm and intact distal pulses  Exam reveals no gallop and no friction rub  Murmur heard  Systolic murmur is present with a grade of 2/6  Pulmonary/Chest: Effort normal and breath sounds normal  He has no wheezes  He has no rales  Abdominal: Soft  Bowel sounds are normal  He exhibits no distension  There is no tenderness  Musculoskeletal: He exhibits no edema  Assessment/Plan:    Benign essential hypertension  Well controlled on lisinopril and amlodipine and will continue  Diabetes mellitus with peripheral circulatory disorder, controlled ([facility] )    Lab Results   Component Value Date    HGBA1C 7 3 (H) 09/18/2019   His A1C is much improved and he will continue insulin at current dosing  He continues to exercise regularly and was urged to continue  Discussed need for weight control  Advised on need for good foot and eye care  Hyperlipidemia  Reviewed labs with patient, his lipids are not to goal   Will change from atorvastatin to rosuvastatin 20 mg  He will repeat labs in 4 months prior to return visit  Advised on low fat diet, exercise, weight loss  Diagnoses and all orders for this visit:    Diabetes mellitus with peripheral circulatory disorder, controlled ([facility] )  -     IRIS Diabetic eye exam  -     CBC and differential; Future  -     Comprehensive metabolic panel; Future  -     Lipid panel; Future  -     HEMOGLOBIN A1C W/ EAG ESTIMATION; Future  -     CBC and differential  -     Comprehensive metabolic panel  -     Lipid panel  -     HEMOGLOBIN A1C W/ EAG ESTIMATION    Hyperlipidemia, unspecified hyperlipidemia type  -     rosuvastatin (CRESTOR) 20 MG tablet; Take 1 tablet (20 mg total) by mouth daily  -     CBC and differential; Future  -     Comprehensive metabolic panel; Future  -     Lipid panel;  Future  -     HEMOGLOBIN A1C W/ EAG ESTIMATION; Future  -     CBC and differential  -     Comprehensive metabolic panel  -     Lipid panel  -     HEMOGLOBIN A1C W/ EAG ESTIMATION    Benign essential hypertension  -     CBC and differential; Future  -     Comprehensive metabolic panel; Future  -     Lipid panel; Future  -     HEMOGLOBIN A1C W/ EAG ESTIMATION; Future  -     CBC and differential  -     Comprehensive metabolic panel  -     Lipid panel  -     HEMOGLOBIN A1C W/ EAG ESTIMATION    Murmur  Comments: This is new and he will get an echo for evaluation  Orders:  -     Echo complete with contrast if indicated; Future          No follow-ups on file         Diya Collier MD

## 2019-10-03 ENCOUNTER — OFFICE VISIT (OUTPATIENT)
Dept: FAMILY MEDICINE CLINIC | Facility: CLINIC | Age: 54
End: 2019-10-03
Payer: COMMERCIAL

## 2019-10-03 VITALS
RESPIRATION RATE: 16 BRPM | WEIGHT: 190 LBS | BODY MASS INDEX: 29.82 KG/M2 | DIASTOLIC BLOOD PRESSURE: 78 MMHG | HEART RATE: 88 BPM | HEIGHT: 67 IN | SYSTOLIC BLOOD PRESSURE: 134 MMHG | TEMPERATURE: 98.4 F

## 2019-10-03 DIAGNOSIS — E78.5 HYPERLIPIDEMIA, UNSPECIFIED HYPERLIPIDEMIA TYPE: ICD-10-CM

## 2019-10-03 DIAGNOSIS — I10 BENIGN ESSENTIAL HYPERTENSION: ICD-10-CM

## 2019-10-03 DIAGNOSIS — R01.1 MURMUR: ICD-10-CM

## 2019-10-03 DIAGNOSIS — E11.51 DIABETES MELLITUS WITH PERIPHERAL CIRCULATORY DISORDER, CONTROLLED (HCC): Primary | ICD-10-CM

## 2019-10-03 PROCEDURE — 3008F BODY MASS INDEX DOCD: CPT | Performed by: INTERNAL MEDICINE

## 2019-10-03 PROCEDURE — 3075F SYST BP GE 130 - 139MM HG: CPT | Performed by: INTERNAL MEDICINE

## 2019-10-03 PROCEDURE — 3078F DIAST BP <80 MM HG: CPT | Performed by: INTERNAL MEDICINE

## 2019-10-03 PROCEDURE — 99214 OFFICE O/P EST MOD 30 MIN: CPT | Performed by: INTERNAL MEDICINE

## 2019-10-03 RX ORDER — ROSUVASTATIN CALCIUM 20 MG/1
20 TABLET, COATED ORAL DAILY
Qty: 90 TABLET | Refills: 3 | Status: SHIPPED | OUTPATIENT
Start: 2019-10-03 | End: 2020-03-26 | Stop reason: SDUPTHER

## 2019-10-03 NOTE — ASSESSMENT & PLAN NOTE
Lab Results   Component Value Date    HGBA1C 7 3 (H) 09/18/2019   His A1C is much improved and he will continue insulin at current dosing  He continues to exercise regularly and was urged to continue  Discussed need for weight control  Advised on need for good foot and eye care

## 2019-10-03 NOTE — ASSESSMENT & PLAN NOTE
Reviewed labs with patient, his lipids are not to goal   Will change from atorvastatin to rosuvastatin 20 mg  He will repeat labs in 4 months prior to return visit  Advised on low fat diet, exercise, weight loss

## 2019-10-21 DIAGNOSIS — E11.51 DIABETES MELLITUS WITH PERIPHERAL CIRCULATORY DISORDER, CONTROLLED (HCC): ICD-10-CM

## 2019-10-22 DIAGNOSIS — I10 BENIGN ESSENTIAL HYPERTENSION: ICD-10-CM

## 2019-10-22 RX ORDER — AMLODIPINE BESYLATE 2.5 MG/1
2.5 TABLET ORAL DAILY
Qty: 30 TABLET | Refills: 5 | Status: SHIPPED | OUTPATIENT
Start: 2019-10-22 | End: 2020-03-26 | Stop reason: SDUPTHER

## 2019-12-02 DIAGNOSIS — E11.51 DIABETES MELLITUS WITH PERIPHERAL CIRCULATORY DISORDER, CONTROLLED (HCC): ICD-10-CM

## 2019-12-02 RX ORDER — INSULIN GLARGINE 100 [IU]/ML
INJECTION, SOLUTION SUBCUTANEOUS
Qty: 5 PEN | Refills: 5 | Status: SHIPPED | OUTPATIENT
Start: 2019-12-02 | End: 2020-02-13

## 2020-02-09 DIAGNOSIS — E11.51 DIABETES MELLITUS WITH PERIPHERAL CIRCULATORY DISORDER, CONTROLLED (HCC): ICD-10-CM

## 2020-02-13 DIAGNOSIS — E11.51 DIABETES MELLITUS WITH PERIPHERAL CIRCULATORY DISORDER, CONTROLLED (HCC): ICD-10-CM

## 2020-02-13 RX ORDER — INSULIN GLARGINE 100 [IU]/ML
INJECTION, SOLUTION SUBCUTANEOUS
Qty: 15 PEN | Refills: 3 | Status: SHIPPED | OUTPATIENT
Start: 2020-02-13 | End: 2020-03-23 | Stop reason: SDUPTHER

## 2020-02-13 NOTE — TELEPHONE ENCOUNTER
Please notify patient of need for change per insurance    He and his wife have been unwilling to change in the past

## 2020-02-17 ENCOUNTER — TELEPHONE (OUTPATIENT)
Dept: FAMILY MEDICINE CLINIC | Facility: CLINIC | Age: 55
End: 2020-02-17

## 2020-02-17 RX ORDER — INSULIN GLARGINE 100 [IU]/ML
INJECTION, SOLUTION SUBCUTANEOUS
Refills: 0 | OUTPATIENT
Start: 2020-02-17

## 2020-02-17 NOTE — TELEPHONE ENCOUNTER
Spoke with Fiorella Neal, WIFE Needs PA again    Pt has ill effects from other insulins  Pressure sores Nausea Vomiting Diarrhea   Will Call insurance today  [id number]

## 2020-02-17 NOTE — TELEPHONE ENCOUNTER
ID [id number]  [phone]  Will Call today  Pt takes 68U daily of Lantus Solostar  Quantity of 15 per 84 days  lakshmi

## 2020-02-17 NOTE — TELEPHONE ENCOUNTER
CALDERON Dueñas called the Office  PA done over the phone  Approved for 1 full year  Ref number [id number]   Pharmacy and the pt are aware  Task Complete  Alicia Keller

## 2020-02-17 NOTE — TELEPHONE ENCOUNTER
Requesting to speak with [facility] regarding his insulin  It needs a prior auth      Please call Sinan Castorena back at [phone]    [facility], [place]

## 2020-03-23 DIAGNOSIS — E11.51 DIABETES MELLITUS WITH PERIPHERAL CIRCULATORY DISORDER, CONTROLLED (HCC): ICD-10-CM

## 2020-03-23 NOTE — TELEPHONE ENCOUNTER
Patient wife informed of this  Waiting for patient to call back to give us fax number of their lab     Florin Moreira MA

## 2020-03-23 NOTE — TELEPHONE ENCOUNTER
Requested medication(s) are due for refill today: Yes  Patient has already received a courtesy refill: No  Other reason request has been forwarded to provider: Failed protocol     Magdaleno Corey MA

## 2020-03-23 NOTE — TELEPHONE ENCOUNTER
Faxed over to [address] [place] [address] CASTILLO Aj  Patient informed to make virtual appt after doing lab core

## 2020-03-25 ENCOUNTER — TELEPHONE (OUTPATIENT)
Dept: FAMILY MEDICINE CLINIC | Facility: CLINIC | Age: 55
End: 2020-03-25

## 2020-03-25 LAB
ALBUMIN SERPL-MCNC: 4.2 G/DL (ref 3.8–4.9)
ALBUMIN/GLOB SERPL: 2.1 {RATIO} (ref 1.2–2.2)
ALP SERPL-CCNC: 69 IU/L (ref 39–117)
ALT SERPL-CCNC: 36 IU/L (ref 0–44)
AST SERPL-CCNC: 31 IU/L (ref 0–40)
BASOPHILS # BLD AUTO: 0 X10E3/UL (ref 0–0.2)
BASOPHILS NFR BLD AUTO: 1 %
BILIRUB SERPL-MCNC: 0.3 MG/DL (ref 0–1.2)
BUN SERPL-MCNC: 17 MG/DL (ref 6–24)
BUN/CREAT SERPL: 22 (ref 9–20)
CALCIUM SERPL-MCNC: 9 MG/DL (ref 8.7–10.2)
CHLORIDE SERPL-SCNC: 106 MMOL/L (ref 96–106)
CHOLEST SERPL-MCNC: 167 MG/DL (ref 100–199)
CHOLEST/HDLC SERPL: 3 RATIO (ref 0–5)
CO2 SERPL-SCNC: 23 MMOL/L (ref 20–29)
CREAT SERPL-MCNC: 0.79 MG/DL (ref 0.76–1.27)
EOSINOPHIL # BLD AUTO: 0.2 X10E3/UL (ref 0–0.4)
EOSINOPHIL NFR BLD AUTO: 3 %
ERYTHROCYTE [DISTWIDTH] IN BLOOD BY AUTOMATED COUNT: 11.9 % (ref 11.6–15.4)
EST. AVERAGE GLUCOSE BLD GHB EST-MCNC: 151 MG/DL
GLOBULIN SER-MCNC: 2 G/DL (ref 1.5–4.5)
GLUCOSE SERPL-MCNC: 78 MG/DL (ref 65–99)
HBA1C MFR BLD: 6.9 % (ref 4.8–5.6)
HCT VFR BLD AUTO: 39.8 % (ref 37.5–51)
HDLC SERPL-MCNC: 56 MG/DL
HGB BLD-MCNC: 13.3 G/DL (ref 13–17.7)
IMM GRANULOCYTES # BLD: 0 X10E3/UL (ref 0–0.1)
IMM GRANULOCYTES NFR BLD: 0 %
LDLC SERPL CALC-MCNC: 93 MG/DL (ref 0–99)
LYMPHOCYTES # BLD AUTO: 1.1 X10E3/UL (ref 0.7–3.1)
LYMPHOCYTES NFR BLD AUTO: 20 %
MCH RBC QN AUTO: 30.1 PG (ref 26.6–33)
MCHC RBC AUTO-ENTMCNC: 33.4 G/DL (ref 31.5–35.7)
MCV RBC AUTO: 90 FL (ref 79–97)
MICRODELETION SYND BLD/T FISH: NORMAL
MONOCYTES # BLD AUTO: 0.4 X10E3/UL (ref 0.1–0.9)
MONOCYTES NFR BLD AUTO: 8 %
NEUTROPHILS # BLD AUTO: 3.7 X10E3/UL (ref 1.4–7)
NEUTROPHILS NFR BLD AUTO: 68 %
PLATELET # BLD AUTO: 232 X10E3/UL (ref 150–450)
POTASSIUM SERPL-SCNC: 4.6 MMOL/L (ref 3.5–5.2)
PROT SERPL-MCNC: 6.2 G/DL (ref 6–8.5)
RBC # BLD AUTO: 4.42 X10E6/UL (ref 4.14–5.8)
SL AMB EGFR AFRICAN AMERICAN: 116 ML/MIN/1.73
SL AMB EGFR NON AFRICAN AMERICAN: 100 ML/MIN/1.73
SL AMB VLDL CHOLESTEROL CALC: 18 MG/DL (ref 5–40)
SODIUM SERPL-SCNC: 143 MMOL/L (ref 134–144)
TRIGL SERPL-MCNC: 90 MG/DL (ref 0–149)
WBC # BLD AUTO: 5.5 X10E3/UL (ref 3.4–10.8)

## 2020-03-26 ENCOUNTER — TELEMEDICINE (OUTPATIENT)
Dept: FAMILY MEDICINE CLINIC | Facility: CLINIC | Age: 55
End: 2020-03-26
Payer: COMMERCIAL

## 2020-03-26 DIAGNOSIS — S88.119A AMPUTATED BELOW KNEE (HCC): Primary | ICD-10-CM

## 2020-03-26 DIAGNOSIS — E11.51 DIABETES MELLITUS WITH PERIPHERAL CIRCULATORY DISORDER, CONTROLLED (HCC): ICD-10-CM

## 2020-03-26 DIAGNOSIS — I10 BENIGN ESSENTIAL HYPERTENSION: ICD-10-CM

## 2020-03-26 DIAGNOSIS — E78.5 HYPERLIPIDEMIA, UNSPECIFIED HYPERLIPIDEMIA TYPE: ICD-10-CM

## 2020-03-26 PROCEDURE — 99213 OFFICE O/P EST LOW 20 MIN: CPT | Performed by: INTERNAL MEDICINE

## 2020-03-26 RX ORDER — AMLODIPINE BESYLATE 2.5 MG/1
2.5 TABLET ORAL DAILY
Qty: 90 TABLET | Refills: 3 | Status: SHIPPED | OUTPATIENT
Start: 2020-03-26 | End: 2021-03-18

## 2020-03-26 RX ORDER — ROSUVASTATIN CALCIUM 20 MG/1
20 TABLET, COATED ORAL DAILY
Qty: 90 TABLET | Refills: 3 | Status: SHIPPED | OUTPATIENT
Start: 2020-03-26 | End: 2021-03-18

## 2020-03-26 RX ORDER — LISINOPRIL 40 MG/1
40 TABLET ORAL DAILY
Qty: 90 TABLET | Refills: 3 | Status: SHIPPED | OUTPATIENT
Start: 2020-03-26 | End: 2021-03-21

## 2020-03-26 NOTE — ASSESSMENT & PLAN NOTE
He states he may need some new supplies and the company that deals with his prosthesis will contact us for what they need

## 2020-03-26 NOTE — PROGRESS NOTES
Virtual Regular Visit    Problem List Items Addressed This Visit        Endocrine    Diabetes mellitus with peripheral circulatory disorder, controlled ([facility] )       Lab Results   Component Value Date    HGBA1C 6 9 (H) 03/24/2020     Reviewed bloodwork with patient  His A1C is much improved  He will continue current insulin doses  There are no hypoglycemic issues  He is up to date with eye exam   Continue dietary changes and we will mail him a lab slip to repeat labs in 4 months prior to follow up visit  Relevant Medications    insulin glargine (Lantus SoloStar) 100 units/mL injection pen    Other Relevant Orders    CBC and differential    Comprehensive metabolic panel    Lipid panel    HEMOGLOBIN A1C W/ EAG ESTIMATION       Cardiovascular and Mediastinum    Benign essential hypertension     This has been well controlled on amlodipine and lisinopril  Relevant Medications    lisinopril (ZESTRIL) 40 mg tablet    amLODIPine (NORVASC) 2 5 mg tablet    Other Relevant Orders    CBC and differential    Comprehensive metabolic panel    Lipid panel    HEMOGLOBIN A1C W/ EAG ESTIMATION       Other    Hyperlipidemia     Reviewed labs with patient and lipids are much improved on rosuvastatin, will continue  Relevant Medications    rosuvastatin (CRESTOR) 20 MG tablet    Other Relevant Orders    CBC and differential    Comprehensive metabolic panel    Lipid panel    HEMOGLOBIN A1C W/ EAG ESTIMATION    Amputated below knee ([facility] ) - Primary     He states he may need some new supplies and the company that deals with his prosthesis will contact us for what they need  Reason for visit is DM and other issues, review bloodwork      Encounter provider Nena Reyes MD    Provider located at [address]  [address] [address]      Recent Visits  Date Type Provider Dept   03/25/20 Telephone Nena Reyes, [address] [facility] recent visits within past 7 days and meeting all other requirements     Today's Visits  Date Type Provider Dept   03/26/20 [id number] [facility], [address] [facility] today's visits and meeting all other requirements     Future Appointments  Date Type Provider Dept   03/26/20 [id number] [facility], [address] [facility] future appointments within next 150 days and meeting all other requirements        After connecting through [employer], the patient was identified by name and date of birth  Puja Ricardo was informed that this is a telemedicine visit and that the visit is being conducted through telephone which may not be secure and therefore, might not be HIPAA-compliant  My office door was closed  No one else was in the room  He acknowledged consent and understanding of privacy and security of the video platform  The patient has agreed to participate and understands they can discontinue the visit at any time  Subjective  Puja Ricardo is a 62 y o  male with a history of DM and other issues  He is feeling well  He cut back on his diet and stopped eating fast food  He does not check his bp at home but glucose levels have been good  He does not remember exact levels  There are no hypoglycemic episodes  He had bloodwork recently and would like to review  He denies chest pain, dyspnea, edema, palpitations       Past Medical History:   Diagnosis Date    Diabetes mellitus ([address] )     Hyperlipidemia     Hypertension        Past Surgical History:   Procedure Laterality Date    BELOW KNEE LEG AMPUTATION         Current Outpatient Medications   Medication Sig Dispense Refill    amLODIPine (NORVASC) 2 5 mg tablet Take 1 tablet (2 5 mg total) by mouth daily 90 tablet 3    CHUYITA MICROLET LANCETS lancets by Does not apply route      glucose blood (CHUYITA CONTOUR TEST) test strip by In Vitro route      insulin glargine (Lantus SoloStar) 100 units/mL injection pen Inject 68 Units under the skin daily 15 pen 3    Insulin Pen Needle (B-D UF III MINI PEN NEEDLES) 31G X 5 MM MISC by Does not apply route daily 100 each 0    Insulin Pen Needle 31G X 5 MM MISC BD Ultra-Fine Mini Pen Needle 31 gauge x 3/16"      lisinopril (ZESTRIL) 40 mg tablet Take 1 tablet (40 mg total) by mouth daily 90 tablet 3    rosuvastatin (CRESTOR) 20 MG tablet Take 1 tablet (20 mg total) by mouth daily 90 tablet 3    sildenafil (VIAGRA) 100 mg tablet Take by mouth       No current facility-administered medications for this visit  Allergies   Allergen Reactions    Bee Venom Anaphylaxis     Reaction Date: [id number];     Penicillins Rash     Reaction Date: [id number]; Review of Systems   Constitutional: Negative  Respiratory: Negative  Cardiovascular: Negative  Endocrine: Negative  I spent 15 minutes with the patient during this visit

## 2020-03-26 NOTE — ASSESSMENT & PLAN NOTE
Lab Results   Component Value Date    HGBA1C 6 9 (H) 03/24/2020     Reviewed bloodwork with patient  His A1C is much improved  He will continue current insulin doses  There are no hypoglycemic issues  He is up to date with eye exam   Continue dietary changes and we will mail him a lab slip to repeat labs in 4 months prior to follow up visit

## 2020-04-24 ENCOUNTER — TELEMEDICINE (OUTPATIENT)
Dept: FAMILY MEDICINE CLINIC | Facility: CLINIC | Age: 55
End: 2020-04-24
Payer: COMMERCIAL

## 2020-04-24 ENCOUNTER — TELEPHONE (OUTPATIENT)
Dept: FAMILY MEDICINE CLINIC | Facility: CLINIC | Age: 55
End: 2020-04-24

## 2020-04-24 DIAGNOSIS — L97.921: Primary | ICD-10-CM

## 2020-04-24 DIAGNOSIS — E11.51 DIABETES MELLITUS WITH PERIPHERAL CIRCULATORY DISORDER, CONTROLLED (HCC): ICD-10-CM

## 2020-04-24 DIAGNOSIS — T87.89: Primary | ICD-10-CM

## 2020-04-24 DIAGNOSIS — L97.921: ICD-10-CM

## 2020-04-24 DIAGNOSIS — S88.119A AMPUTATED BELOW KNEE (HCC): ICD-10-CM

## 2020-04-24 DIAGNOSIS — T87.89: ICD-10-CM

## 2020-04-24 PROCEDURE — 99213 OFFICE O/P EST LOW 20 MIN: CPT | Performed by: FAMILY MEDICINE

## 2020-04-24 RX ORDER — SULFAMETHOXAZOLE AND TRIMETHOPRIM 800; 160 MG/1; MG/1
1 TABLET ORAL EVERY 12 HOURS SCHEDULED
Qty: 20 TABLET | Refills: 0 | Status: SHIPPED | OUTPATIENT
Start: 2020-04-24 | End: 2020-04-24 | Stop reason: SDUPTHER

## 2020-04-24 RX ORDER — SULFAMETHOXAZOLE AND TRIMETHOPRIM 800; 160 MG/1; MG/1
1 TABLET ORAL EVERY 12 HOURS SCHEDULED
Qty: 20 TABLET | Refills: 0 | Status: SHIPPED | OUTPATIENT
Start: 2020-04-24 | End: 2020-05-04

## 2020-04-27 ENCOUNTER — TELEPHONE (OUTPATIENT)
Dept: FAMILY MEDICINE CLINIC | Facility: CLINIC | Age: 55
End: 2020-04-27

## 2020-04-29 ENCOUNTER — TELEMEDICINE (OUTPATIENT)
Dept: FAMILY MEDICINE CLINIC | Facility: CLINIC | Age: 55
End: 2020-04-29
Payer: COMMERCIAL

## 2020-04-29 ENCOUNTER — TELEPHONE (OUTPATIENT)
Dept: FAMILY MEDICINE CLINIC | Facility: CLINIC | Age: 55
End: 2020-04-29

## 2020-04-29 VITALS — WEIGHT: 180 LBS | BODY MASS INDEX: 28.19 KG/M2

## 2020-04-29 DIAGNOSIS — E11.51 DIABETES MELLITUS WITH PERIPHERAL CIRCULATORY DISORDER, CONTROLLED (HCC): Primary | ICD-10-CM

## 2020-04-29 DIAGNOSIS — I73.9 PERIPHERAL ARTERIAL DISEASE (HCC): ICD-10-CM

## 2020-04-29 DIAGNOSIS — S88.119A AMPUTATED BELOW KNEE (HCC): ICD-10-CM

## 2020-04-29 DIAGNOSIS — I10 BENIGN ESSENTIAL HYPERTENSION: ICD-10-CM

## 2020-04-29 PROCEDURE — 99214 OFFICE O/P EST MOD 30 MIN: CPT | Performed by: INTERNAL MEDICINE

## 2020-05-04 ENCOUNTER — TELEMEDICINE (OUTPATIENT)
Dept: FAMILY MEDICINE CLINIC | Facility: CLINIC | Age: 55
End: 2020-05-04
Payer: COMMERCIAL

## 2020-05-04 DIAGNOSIS — T87.89 PRESSURE ULCER OF BKA STUMP, STAGE 2 (HCC): ICD-10-CM

## 2020-05-04 DIAGNOSIS — L89.892 PRESSURE ULCER OF BKA STUMP, STAGE 2 (HCC): ICD-10-CM

## 2020-05-04 DIAGNOSIS — S88.119A AMPUTATED BELOW KNEE (HCC): Primary | ICD-10-CM

## 2020-05-04 PROCEDURE — 99213 OFFICE O/P EST LOW 20 MIN: CPT | Performed by: INTERNAL MEDICINE

## 2020-05-27 ENCOUNTER — TELEPHONE (OUTPATIENT)
Dept: FAMILY MEDICINE CLINIC | Facility: CLINIC | Age: 55
End: 2020-05-27

## 2020-05-27 DIAGNOSIS — L89.892 PRESSURE ULCER OF BKA STUMP, STAGE 2 (HCC): Primary | ICD-10-CM

## 2020-05-27 DIAGNOSIS — T87.89 PRESSURE ULCER OF BKA STUMP, STAGE 2 (HCC): Primary | ICD-10-CM

## 2020-05-27 RX ORDER — SULFAMETHOXAZOLE AND TRIMETHOPRIM 800; 160 MG/1; MG/1
1 TABLET ORAL 2 TIMES DAILY
Qty: 14 TABLET | Refills: 0 | Status: SHIPPED | OUTPATIENT
Start: 2020-05-27 | End: 2020-06-03

## 2020-06-24 DIAGNOSIS — E11.51 DIABETES MELLITUS WITH PERIPHERAL CIRCULATORY DISORDER, CONTROLLED (HCC): ICD-10-CM

## 2020-06-30 DIAGNOSIS — E11.51 DIABETES MELLITUS WITH PERIPHERAL CIRCULATORY DISORDER, CONTROLLED (HCC): ICD-10-CM

## 2020-06-30 NOTE — TELEPHONE ENCOUNTER
[facility] calling, lantus sent on 06/24,  One box which is a 22 day supply  Insurance only covers a 90 day supply  Please send new order with a 90 day supply  He takes 68 units per day, one box last 22 days  Please advise   Frank Epstein MA

## 2020-07-28 ENCOUNTER — TELEPHONE (OUTPATIENT)
Dept: FAMILY MEDICINE CLINIC | Facility: CLINIC | Age: 55
End: 2020-07-28

## 2020-07-28 ENCOUNTER — APPOINTMENT (OUTPATIENT)
Dept: RADIOLOGY | Facility: CLINIC | Age: 55
End: 2020-07-28
Payer: COMMERCIAL

## 2020-07-28 ENCOUNTER — OFFICE VISIT (OUTPATIENT)
Dept: FAMILY MEDICINE CLINIC | Facility: CLINIC | Age: 55
End: 2020-07-28
Payer: COMMERCIAL

## 2020-07-28 VITALS
WEIGHT: 180 LBS | SYSTOLIC BLOOD PRESSURE: 120 MMHG | BODY MASS INDEX: 28.25 KG/M2 | DIASTOLIC BLOOD PRESSURE: 70 MMHG | RESPIRATION RATE: 16 BRPM | TEMPERATURE: 96.5 F | HEIGHT: 67 IN | HEART RATE: 76 BPM

## 2020-07-28 DIAGNOSIS — L97.921: ICD-10-CM

## 2020-07-28 DIAGNOSIS — E11.51 DIABETES MELLITUS WITH PERIPHERAL CIRCULATORY DISORDER, CONTROLLED (HCC): ICD-10-CM

## 2020-07-28 DIAGNOSIS — L97.411 DIABETIC ULCER OF RIGHT HEEL ASSOCIATED WITH DIABETES MELLITUS DUE TO UNDERLYING CONDITION, LIMITED TO BREAKDOWN OF SKIN (HCC): Primary | ICD-10-CM

## 2020-07-28 DIAGNOSIS — E08.621 DIABETIC ULCER OF RIGHT HEEL ASSOCIATED WITH DIABETES MELLITUS DUE TO UNDERLYING CONDITION, LIMITED TO BREAKDOWN OF SKIN (HCC): Primary | ICD-10-CM

## 2020-07-28 DIAGNOSIS — L97.411 DIABETIC ULCER OF RIGHT HEEL ASSOCIATED WITH DIABETES MELLITUS DUE TO UNDERLYING CONDITION, LIMITED TO BREAKDOWN OF SKIN (HCC): ICD-10-CM

## 2020-07-28 DIAGNOSIS — S88.119A AMPUTATED BELOW KNEE (HCC): ICD-10-CM

## 2020-07-28 DIAGNOSIS — T87.89: ICD-10-CM

## 2020-07-28 DIAGNOSIS — E08.621 DIABETIC ULCER OF RIGHT HEEL ASSOCIATED WITH DIABETES MELLITUS DUE TO UNDERLYING CONDITION, LIMITED TO BREAKDOWN OF SKIN (HCC): ICD-10-CM

## 2020-07-28 PROCEDURE — 3008F BODY MASS INDEX DOCD: CPT | Performed by: NURSE PRACTITIONER

## 2020-07-28 PROCEDURE — 1036F TOBACCO NON-USER: CPT | Performed by: NURSE PRACTITIONER

## 2020-07-28 PROCEDURE — 73630 X-RAY EXAM OF FOOT: CPT

## 2020-07-28 PROCEDURE — 99214 OFFICE O/P EST MOD 30 MIN: CPT | Performed by: NURSE PRACTITIONER

## 2020-07-28 NOTE — TELEPHONE ENCOUNTER
Spoke to patients wife Tony Naranjo, she says  there is a big crack on his heel  Heel is red swollen, slightly warm to touch  It is not bleeding or oozing  Has been putting neosporin on it  She wants him seen  No fever, 127/80 82P 246PPBS   Will call patient back after getting advice from [address]   Basia Manzo MA

## 2020-07-28 NOTE — TELEPHONE ENCOUNTER
Wound is on right foot (heel), other leg is a prosthetic  Has had wound for about 3 weeks  Wants to know if he should come in for appt with Dr May Palacio or go ED  She feels it is advancing  Pls call pt wife asap  Spoke with Rivka Fleischer and she will call pt back after discussing with Dr May Palacio

## 2020-07-28 NOTE — PROGRESS NOTES
Assessment/Plan:    1  Diabetic ulcer of right heel associated with diabetes mellitus due to underlying condition, limited to breakdown of skin 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, limited to breakdown of skin (HCC)  -     collagenase (SANTYL) ointment; Apply topically daily    4  Diabetes mellitus with peripheral circulatory disorder, controlled (HCC)  -     collagenase (SANTYL) ointment; Apply topically daily          Wound will need to be debrided in the wound center  Pt was set up with an appt tomorrow morning in the wound center  Wound was dressed with Santyl and nonadherent dressing  F/u with PCP as previously scheduled  There are no Patient Instructions on file for this visit  No follow-ups on file  Subjective:      Patient ID: Hortensia Mancia is a 60 y o  male  Chief Complaint   Patient presents with    Wound Check     on right heel, previous wound on left stump  ac/cma     Diabetes     foot exam due  ac/cma        Here today with complaints of an ulcer on his right heel that has been present for the past month  States it is becomes deeper and more painful  He has applied topical antibiotic ointment with no relief  Of note, he does have a diabetic ulcer affecting his left stump, which he has been treating with Santyl  He has a history of diabetic ulcers and has been treated in the wound center in the past for these  The following portions of the patient's history were reviewed and updated as appropriate: allergies, current medications, past family history, past medical history, past social history, past surgical history and problem list     Review of Systems   Constitutional: Negative  Respiratory: Negative      Cardiovascular: Negative  Skin:        See HPI         Current Outpatient Medications   Medication Sig Dispense Refill    amLODIPine (NORVASC) 2 5 mg tablet Take 1 tablet (2 5 mg total) by mouth daily 90 tablet 3    CHUYITA MICROLET LANCETS lancets by Does not apply route      glucose blood (CHUYITA CONTOUR TEST) test strip by In Vitro route      insulin glargine (LANTUS SOLOSTAR) 100 units/mL injection pen Inject 68 Units under the skin daily 25 pen 3    Insulin Pen Needle (B-D UF III MINI PEN NEEDLES) 31G X 5 MM MISC by Does not apply route daily 100 each 0    Insulin Pen Needle 31G X 5 MM MISC BD Ultra-Fine Mini Pen Needle 31 gauge x 3/16"      lisinopril (ZESTRIL) 40 mg tablet Take 1 tablet (40 mg total) by mouth daily 90 tablet 3    rosuvastatin (CRESTOR) 20 MG tablet Take 1 tablet (20 mg total) by mouth daily 90 tablet 3    sildenafil (VIAGRA) 100 mg tablet Take by mouth      collagenase (SANTYL) ointment Apply topically daily 90 g 0     No current facility-administered medications for this visit  Objective:    /70   Pulse 76   Temp (!) 96 5 °F (35 8 °C)   Resp 16   Ht 5' 7" (1 702 m)   Wt 81 6 kg (180 lb)   BMI 28 19 kg/m²        Physical Exam   Constitutional: He appears well-developed and well-nourished  Cardiovascular: Normal rate, regular rhythm, normal heart sounds and intact distal pulses  Pulses are no weak pulses  No murmur heard  Pulses:       Dorsalis pedis pulses are 1+ on the right side  Posterior tibial pulses are 1+ on the right side  Pulmonary/Chest: Effort normal and breath sounds normal    Musculoskeletal:        Feet:    Feet:   Right Foot:   Skin Integrity: Positive for ulcer  Negative for skin breakdown, erythema, warmth, callus or dry skin  Left Foot: amputated  Neurological: He is alert  Skin: Skin is warm and dry  Left BKA stump with healing ulcer   Psychiatric: He has a normal mood and affect  Nursing note and vitals reviewed                 Diabetic Foot Exam    Patient's shoes and socks removed  Right Foot/Ankle   Right Foot Inspection  Skin Exam: skin normal, skin intact and ulcer no dry skin, no warmth, no callus, no erythema, no maceration, no abnormal color, no pre-ulcer and no callus                          Toe Exam: ROM and strength within normal limits  Sensory   Vibration: intact  Proprioception: intact   Monofilament testing: intact  Vascular  Capillary refills: < 3 seconds  The right DP pulse is 1+  The right PT pulse is 1+  Left Foot/Ankle  Left Foot Inspection  Skin Exam: skin normal                       Amputation: amputation left foot                       Assign Risk Category:  No deformity present; Loss of protective sensation;  No weak pulses       Risk: 0        DISHA Adamson

## 2020-07-29 ENCOUNTER — APPOINTMENT (OUTPATIENT)
Dept: WOUND CARE | Facility: HOSPITAL | Age: 55
End: 2020-07-29
Payer: COMMERCIAL

## 2020-07-29 PROCEDURE — G0463 HOSPITAL OUTPT CLINIC VISIT: HCPCS

## 2020-07-29 PROCEDURE — 97597 DBRDMT OPN WND 1ST 20 CM/<: CPT

## 2020-07-29 PROCEDURE — 99213 OFFICE O/P EST LOW 20 MIN: CPT

## 2020-07-29 PROCEDURE — 11042 DBRDMT SUBQ TIS 1ST 20SQCM/<: CPT

## 2020-08-05 ENCOUNTER — OFFICE VISIT (OUTPATIENT)
Dept: WOUND CARE | Facility: HOSPITAL | Age: 55
End: 2020-08-05
Payer: COMMERCIAL

## 2020-08-05 PROCEDURE — 11042 DBRDMT SUBQ TIS 1ST 20SQCM/<: CPT

## 2020-08-05 PROCEDURE — 97597 DBRDMT OPN WND 1ST 20 CM/<: CPT

## 2020-08-19 ENCOUNTER — OFFICE VISIT (OUTPATIENT)
Dept: WOUND CARE | Facility: HOSPITAL | Age: 55
End: 2020-08-19
Payer: COMMERCIAL

## 2020-08-19 VITALS
TEMPERATURE: 98.4 F | SYSTOLIC BLOOD PRESSURE: 135 MMHG | DIASTOLIC BLOOD PRESSURE: 69 MMHG | RESPIRATION RATE: 18 BRPM | HEART RATE: 76 BPM

## 2020-08-19 DIAGNOSIS — S88.119A AMPUTATED BELOW KNEE (HCC): ICD-10-CM

## 2020-08-19 DIAGNOSIS — L97.412 ULCER OF HEEL AND MIDFOOT, RIGHT, WITH FAT LAYER EXPOSED (HCC): ICD-10-CM

## 2020-08-19 DIAGNOSIS — L89.892 PRESSURE ULCER OF BKA STUMP, STAGE 2 (HCC): Primary | ICD-10-CM

## 2020-08-19 DIAGNOSIS — S88.119A: ICD-10-CM

## 2020-08-19 DIAGNOSIS — L97.509 TYPE 2 DIABETES MELLITUS WITH FOOT ULCER, UNSPECIFIED WHETHER LONG TERM INSULIN USE (HCC): ICD-10-CM

## 2020-08-19 DIAGNOSIS — T87.89 PRESSURE ULCER OF BKA STUMP, STAGE 2 (HCC): Primary | ICD-10-CM

## 2020-08-19 DIAGNOSIS — E11.621 TYPE 2 DIABETES MELLITUS WITH FOOT ULCER, UNSPECIFIED WHETHER LONG TERM INSULIN USE (HCC): ICD-10-CM

## 2020-08-19 PROCEDURE — 11042 DBRDMT SUBQ TIS 1ST 20SQCM/<: CPT | Performed by: FAMILY MEDICINE

## 2020-08-19 PROCEDURE — 97597 DBRDMT OPN WND 1ST 20 CM/<: CPT | Performed by: FAMILY MEDICINE

## 2020-08-19 PROCEDURE — 87070 CULTURE OTHR SPECIMN AEROBIC: CPT | Performed by: FAMILY MEDICINE

## 2020-08-19 PROCEDURE — NC001 PR NO CHARGE: Performed by: FAMILY MEDICINE

## 2020-08-19 PROCEDURE — 11045 DBRDMT SUBQ TISS EACH ADDL: CPT | Performed by: FAMILY MEDICINE

## 2020-08-19 PROCEDURE — 87186 SC STD MICRODIL/AGAR DIL: CPT | Performed by: FAMILY MEDICINE

## 2020-08-19 PROCEDURE — 87077 CULTURE AEROBIC IDENTIFY: CPT | Performed by: FAMILY MEDICINE

## 2020-08-19 PROCEDURE — 87205 SMEAR GRAM STAIN: CPT | Performed by: FAMILY MEDICINE

## 2020-08-19 RX ORDER — CIPROFLOXACIN 500 MG/1
500 TABLET, FILM COATED ORAL EVERY 12 HOURS SCHEDULED
Qty: 20 TABLET | Refills: 0 | Status: SHIPPED | OUTPATIENT
Start: 2020-08-19 | End: 2020-08-29

## 2020-08-19 RX ORDER — LIDOCAINE HYDROCHLORIDE 40 MG/ML
5 SOLUTION TOPICAL ONCE
Status: COMPLETED | OUTPATIENT
Start: 2020-08-19 | End: 2020-08-19

## 2020-08-19 RX ADMIN — LIDOCAINE HYDROCHLORIDE 5 ML: 40 SOLUTION TOPICAL at 12:13

## 2020-08-19 NOTE — PATIENT INSTRUCTIONS
Orders Placed This Encounter   Procedures    Wound cleansing and dressings     Right heel wound -   Wash your hands with soap and water  Remove old dressing, discard into plastic bag and place in trash  Cleanse the wound with Dakin's 1/4 strength prior to applying a clean dressing  Do not use tissue or cotton balls  Do not scrub the wound  Pat dry using gauze  - This was done today  Shower no     Apply Santyl to the right heel wound  Cover with gauze  Secure with bonilla and tape  Change dressing daily - This was done today - (hydrogel used today in wound care)    Left lateral stump wound and left medial knee wound  Wash your hands with soap and water  Remove old dressing, discard into plastic bag and place in trash  Cleanse the wound with normal saline prior to applying a clean dressing  Do not use tissue or cotton balls  Do not scrub the wound  Pat dry using gauze  - this was done today  Shower no  Apply hydrogel to the wounds  Cover with allevyn  Change dressing daily     Standing Status:   Future     Standing Expiration Date:   8/19/2021    Wound off loading     Off-loading Instructions:    Keep weight and pressure off wound at all times  and Other Darco heel offloading shoe       Standing Status:   Future     Standing Expiration Date:   8/19/2021

## 2020-08-19 NOTE — PROGRESS NOTES
Patient ID: Maria R Roman is a 60 y o  male Date of Birth 1965     Chief Complaint  Chief Complaint   Patient presents with    Follow Up Wound Care Visit       Allergies  Bee venom and Penicillins    Assessment:    Ulcer of heel and midfoot, right, with fat layer exposed ([facility] )  Conde grade 4   Debrided today  Should be a candidate for HBO  Abx started  Culture taken       Diagnoses and all orders for this visit:    Pressure ulcer of BKA stump, stage 2 ([facility])  -     lidocaine (XYLOCAINE) 4 % topical solution 5 mL  -     Wound cleansing and dressings; Future  -     Wound off loading; Future  -     Debridement  -     Debridement    Amputated below knee ([facility])  -     lidocaine (XYLOCAINE) 4 % topical solution 5 mL  -     Wound cleansing and dressings; Future  -     Wound off loading; Future    Ulcer of heel and midfoot, right, with fat layer exposed ([place] [address] )  -     Wound culture and Gram stain; Future  -     VAS lower limb arterial duplex, complete bilateral; Future  -     ciprofloxacin (CIPRO) 500 mg tablet; Take 1 tablet (500 mg total) by mouth every 12 (twelve) hours for 10 days  -     Debridement    Unilateral complete below-knee amputation, initial encounter ([place] [address] )    Type 2 diabetes mellitus with foot ulcer, unspecified whether long term insulin use ([place] [address] )              Debridement   Wound 07/29/20 Pretibial Left;Lateral    Consent obtained? verbal  Consent given by: patient  Risks discussed?  procedural risks discussed  Time out called at 8/19/2020 12:47 PM  Immediately prior to the procedure a time out was called  Performed by: physician  Debridement type: selective  Pain control: lidocaine 4%  Pre-debridement measurements  Length (cm): 0 1  Width (cm): 0 1  Depth (cm): 0 1  Surface Area (cm^2): 0 01  Volume (cm^3): 0    Post-debridement measurements  Length (cm): 0 1  Width (cm): 0 1  Depth (cm): 0 1  Percent debrided: 100%  Surface Area (cm^2): 0 01  Area debrided (cm^2): 0 01  Volume (cm^3): 0  Devitalized tissue debrided: slough  Instrument(s) utilized: forceps  Bleeding: none  Hemostasis obtained with: not applicable  Procedural pain (0-10): 1  Post-procedural pain: 0   Response to treatment: procedure was tolerated well    Debridement   Wound 08/19/20 Knee Left;Medial    Consent obtained? verbal  Consent given by: patient  Risks discussed? procedural risks discussed  Time out called at 8/19/2020 12:49 PM  Immediately prior to the procedure a time out was called  Performed by: physician  Debridement type: selective  Pain control: lidocaine 4%  Pre-debridement measurements  Length (cm): 0 3  Width (cm): 0 4  Depth (cm): 0 1  Surface Area (cm^2): 0 12  Volume (cm^3): 0 01    Post-debridement measurements  Length (cm): 0 3  Width (cm): 0 4  Depth (cm): 0 1  Percent debrided: 100%  Surface Area (cm^2): 0 12  Area debrided (cm^2): 0 12  Volume (cm^3): 0 01  Devitalized tissue debrided: slough  Instrument(s) utilized: curette  Bleeding: none  Hemostasis obtained with: not applicable  Procedural pain (0-10): 1  Post-procedural pain: 0   Response to treatment: procedure was tolerated well    Debridement   Wound 07/29/20 Heel Right    Consent obtained? verbal  Consent given by: patient  Risks discussed?  procedural risks discussed  Time out called at 8/19/2020 12:50 PM  Immediately prior to the procedure a time out was called  Performed by: physician  Debridement type: surgical  Level of debridement: subcutaneous tissue  Pain control: lidocaine 4%  Pre-debridement measurements  Length (cm): 4 8  Width (cm): 4  Depth (cm): 0 5  Surface Area (cm^2): 19 2  Volume (cm^3): 9 6    Post-debridement measurements  Length (cm): 4 8  Width (cm): 4 2  Depth (cm): 0 8  Percent debrided: 100%  Surface Area (cm^2): 20 16  Area debrided (cm^2): 20 16  Volume (cm^3): 16 13  Tissue and other material debrided: dermis, epidermis and subcutaneous tissue  Devitalized tissue debrided: exudate, necrotic debris and slough  Instrument(s) utilized: curette, blade, forceps and scissors  Bleeding: none  Hemostasis obtained with: not applicable  Procedural pain (0-10): insensate  Post-procedural pain: insensate   Response to treatment: procedure was tolerated well          Plan:     Wound 07/29/20 Heel Right (Active)       Wound 07/29/20 Pretibial Left;Lateral (Active)       Wound 08/19/20 Knee Left;Medial (Active)       Subjective: Italo Browning Pt is a well controlled diabetic with a necrotic wound on rt heel  This is a worsening lesion  Looking back Conde lesion from the beginning  Pt has an odor to it  Pt has developed another lesion at the stump site on left lower ext  Has seen the orthotic technition and adjustments were made to the prosthesis but will need to be seen again      The following portions of the patient's history were reviewed and updated as appropriate:   He  has a past medical history of Diabetes mellitus ([facility] ), Hyperlipidemia, and Hypertension  He   Patient Active Problem List    Diagnosis Date Noted    Unilateral complete BKA ([facility] ) 08/19/2020    Diabetes mellitus with ulcer of foot ([facility] ) 08/19/2020    Ulcer of heel and midfoot, right, with fat layer exposed ([facility] ) 08/19/2020    Pressure ulcer of BKA stump, stage 2 ([facility] ) 05/04/2020    BMI 29 0-29 9,adult 04/11/2019    Gastroesophageal reflux disease without esophagitis 07/12/2018    Diabetes mellitus with peripheral circulatory disorder, controlled ([facility] ) 12/24/2015    Amputated below knee ([facility] ) 10/22/2015    Male erectile disorder 07/02/2014    Peripheral arterial disease ([facility] ) 07/02/2014    Hyperlipidemia 02/14/2013    Anxiety disorder 09/04/2012    Benign essential hypertension 09/04/2012     He  has a past surgical history that includes Below knee leg amputation  His family history includes No Known Problems in his mother  He  reports that he quit smoking about 17 years ago  He has never used smokeless tobacco  He reports previous alcohol use   He reports previous drug use   Current Outpatient Medications   Medication Sig Dispense Refill    amLODIPine (NORVASC) 2 5 mg tablet Take 1 tablet (2 5 mg total) by mouth daily 90 tablet 3    CHUYITA MICROLET LANCETS lancets by Does not apply route      ciprofloxacin (CIPRO) 500 mg tablet Take 1 tablet (500 mg total) by mouth every 12 (twelve) hours for 10 days 20 tablet 0    collagenase (SANTYL) ointment Apply topically daily 90 g 0    glucose blood (CHUYITA CONTOUR TEST) test strip by In Vitro route      insulin glargine (LANTUS SOLOSTAR) 100 units/mL injection pen Inject 68 Units under the skin daily 25 pen 3    Insulin Pen Needle (B-D UF III MINI PEN NEEDLES) 31G X 5 MM MISC by Does not apply route daily 100 each 0    Insulin Pen Needle 31G X 5 MM MISC BD Ultra-Fine Mini Pen Needle 31 gauge x 3/16"      lisinopril (ZESTRIL) 40 mg tablet Take 1 tablet (40 mg total) by mouth daily 90 tablet 3    rosuvastatin (CRESTOR) 20 MG tablet Take 1 tablet (20 mg total) by mouth daily 90 tablet 3    sildenafil (VIAGRA) 100 mg tablet Take by mouth       No current facility-administered medications for this visit  He is allergic to bee venom and penicillins       Review of Systems   Constitutional: Negative for activity change, appetite change, chills, diaphoresis, fatigue, fever and unexpected weight change  HENT: Negative for congestion, dental problem, ear pain, mouth sores, sinus pressure, sinus pain, sore throat and trouble swallowing  Eyes: Negative for photophobia, discharge and itching  Respiratory: Negative for apnea, chest tightness and shortness of breath  Cardiovascular: Negative for chest pain, palpitations and leg swelling  Gastrointestinal: Negative for abdominal distention, abdominal pain, blood in stool, nausea and vomiting  Endocrine: Negative for cold intolerance, heat intolerance, polydipsia, polyphagia and polyuria  Genitourinary: Negative for difficulty urinating     Musculoskeletal: Negative for arthralgias  Skin: Positive for wound  Negative for color change  Neurological: Negative for dizziness, syncope, speech difficulty and headaches  Hematological: Negative for adenopathy  Psychiatric/Behavioral: Negative for agitation and behavioral problems  Objective:       Wound 07/29/20 Heel Right (Active)   Wound Image Images linked 08/19/20 1226       /69   Pulse 76   Temp 98 4 °F (36 9 °C)   Resp 18     Physical Exam  Vitals signs and nursing note reviewed  Constitutional:       General: He is not in acute distress  Appearance: He is well-developed  He is not ill-appearing, toxic-appearing or diaphoretic  HENT:      Head: Normocephalic and atraumatic  Nose: Nose normal    Eyes:      General:         Right eye: No discharge  Left eye: No discharge  Conjunctiva/sclera: Conjunctivae normal    Neck:      Musculoskeletal: Normal range of motion  Pulmonary:      Effort: Pulmonary effort is normal  No respiratory distress  Skin:     Comments: See wound analysis   Neurological:      Mental Status: He is alert  Wound Instructions:  Orders Placed This Encounter   Procedures    Wound cleansing and dressings     Right heel wound -   Wash your hands with soap and water  Remove old dressing, discard into plastic bag and place in trash  Cleanse the wound with Dakin's 1/4 strength prior to applying a clean dressing  Do not use tissue or cotton balls  Do not scrub the wound  Pat dry using gauze  - This was done today  Shower no     Apply Santyl to the right heel wound  Cover with gauze  Secure with bonilla and tape  Change dressing daily - This was done today - (hydrogel used today in wound care)    Left lateral stump wound and left medial knee wound  Wash your hands with soap and water  Remove old dressing, discard into plastic bag and place in trash  Cleanse the wound with normal saline prior to applying a clean dressing   Do not use tissue or cotton balls  Do not scrub the wound  Pat dry using gauze  - this was done today  Shower no  Apply hydrogel to the wounds  Cover with allevyn  Change dressing daily     Standing Status:   Future     Standing Expiration Date:   8/19/2021    Wound off loading     Off-loading Instructions:    Keep weight and pressure off wound at all times  and Other Darco heel offloading shoe       Standing Status:   Future     Standing Expiration Date:   8/19/2021    Debridement     This order was created via procedure documentation    Debridement     This order was created via procedure documentation    Debridement     This order was created via procedure documentation    Wound culture and Gram stain     Standing Status:   Future     Standing Expiration Date:   8/19/2021

## 2020-08-21 LAB
BACTERIA WND AEROBE CULT: ABNORMAL
BACTERIA WND AEROBE CULT: ABNORMAL
GRAM STN SPEC: ABNORMAL

## 2020-08-21 NOTE — RESULT ENCOUNTER NOTE
Pt already started on cipro at the wound center    I will discuss with him at his follow up appt at wound center

## 2020-08-26 ENCOUNTER — OFFICE VISIT (OUTPATIENT)
Dept: WOUND CARE | Facility: HOSPITAL | Age: 55
End: 2020-08-26
Payer: COMMERCIAL

## 2020-08-26 VITALS — HEART RATE: 87 BPM | TEMPERATURE: 97.9 F | DIASTOLIC BLOOD PRESSURE: 80 MMHG | SYSTOLIC BLOOD PRESSURE: 122 MMHG

## 2020-08-26 DIAGNOSIS — L89.892 PRESSURE ULCER OF BKA STUMP, STAGE 2 (HCC): ICD-10-CM

## 2020-08-26 DIAGNOSIS — L97.412 ULCER OF HEEL AND MIDFOOT, RIGHT, WITH FAT LAYER EXPOSED (HCC): Primary | ICD-10-CM

## 2020-08-26 DIAGNOSIS — T87.89 PRESSURE ULCER OF BKA STUMP, STAGE 2 (HCC): ICD-10-CM

## 2020-08-26 PROCEDURE — 97597 DBRDMT OPN WND 1ST 20 CM/<: CPT | Performed by: FAMILY MEDICINE

## 2020-08-26 PROCEDURE — 87205 SMEAR GRAM STAIN: CPT | Performed by: FAMILY MEDICINE

## 2020-08-26 PROCEDURE — 11042 DBRDMT SUBQ TIS 1ST 20SQCM/<: CPT | Performed by: FAMILY MEDICINE

## 2020-08-26 PROCEDURE — 87186 SC STD MICRODIL/AGAR DIL: CPT | Performed by: FAMILY MEDICINE

## 2020-08-26 PROCEDURE — 87077 CULTURE AEROBIC IDENTIFY: CPT | Performed by: FAMILY MEDICINE

## 2020-08-26 PROCEDURE — 87070 CULTURE OTHR SPECIMN AEROBIC: CPT | Performed by: FAMILY MEDICINE

## 2020-08-26 RX ORDER — LIDOCAINE HYDROCHLORIDE 40 MG/ML
5 SOLUTION TOPICAL ONCE
Status: COMPLETED | OUTPATIENT
Start: 2020-08-26 | End: 2020-08-26

## 2020-08-26 RX ADMIN — LIDOCAINE HYDROCHLORIDE 5 ML: 40 SOLUTION TOPICAL at 11:45

## 2020-08-26 NOTE — PATIENT INSTRUCTIONS
Orders Placed This Encounter   Procedures    Wound cleansing and dressings     Wound cleansing and dressings  Right Heel Wound   Wash your hands with soap and water  Remove old dressing, discard into plastic bag and place in trash  Cleanse the wound with Dakin's 1/4 strength prior to applying a clean dressing  Do not use tissue or cotton balls  Do not scrub the wound  Pat dry using gauze  - This was done today  Shower no      Apply Santyl to the right heel wound  Cover with gauze  Secure with bonilla and tape  Change dressing daily - This was done today - (hydrogel used today in wound care)    This was done today       Left Lateral Stump Wound    Wash your hands with soap and water  Remove old dressing, discard into plastic bag and place in trash  Cleanse the wound with normal saline prior to applying a clean dressing  Do not use tissue or cotton balls  Do not scrub the wound  Pat dry using gauze  - this was done today  Shower no  Apply hydrogel to the wounds  Cover with allevyn  Change dressing daily    Left Medial Knee Wound  Wash your hands with soap and water  Remove old dressing, discard into plastic bag and place in trash  Cleanse the wound with normal saline prior to applying a clean dressing  Do not use tissue or cotton balls  Do not scrub the wound  Pat dry using gauze  - this was done today  Shower no  Apply hydrogel to the wounds  Cover with allevyn  Change dressing daily    This was done today       Standing Status:   Future     Standing Expiration Date:   8/26/2021

## 2020-08-26 NOTE — PROGRESS NOTES
Patient ID: Cy King is a 60 y o  male Date of Birth 1965     Chief Complaint  Chief Complaint   Patient presents with    Follow Up Wound Care Visit     bilateral lower extrimities       Allergies  Bee venom and Penicillins    Assessment:    Ulcer of heel and midfoot, right, with fat layer exposed ([facility] )  Long conversation had with pt about HBO and surgical debridement  We debrided him today will have him cont abx  New culture taken today  Pt will see podiatry on Friday to eval for debridement in surgical suite       Diagnoses and all orders for this visit:    Ulcer of heel and midfoot, right, with fat layer exposed ([facility] )  -     Cancel: Wound cleansing and dressings; Future  -     Wound off loading; Future  -     Wound culture and Gram stain  -     lidocaine (XYLOCAINE) 4 % topical solution 5 mL  -     Wound cleansing and dressings; Future  -     Debridement    Pressure ulcer of BKA stump, stage 2 ([facility])  -     Cancel: Wound cleansing and dressings; Future  -     Wound cleansing and dressings; Future  -     Debridement    Other orders  -     Cancel: Wound compression and edema control; Future              Debridement   Wound 07/29/20 Heel Right    Consent obtained? verbal  Consent given by: patient  Risks discussed?  procedural risks discussed  Time out called at 8/26/2020 12:35 PM  Immediately prior to the procedure a time out was called  Performed by: physician  Debridement type: surgical  Level of debridement: subcutaneous tissue  Pain control: lidocaine 4%  Pre-debridement measurements  Length (cm): 4 5  Width (cm): 4  Depth (cm): 0 8  Surface Area (cm^2): 18  Volume (cm^3): 14 4    Post-debridement measurements  Length (cm): 4 5  Width (cm): 4  Depth (cm): 1 3  Percent debrided: 100%  Surface Area (cm^2): 18  Area debrided (cm^2): 18  Volume (cm^3): 23 4  Tissue and other material debrided: subcutaneous tissue  Devitalized tissue debrided: exudate, fibrin, necrotic debris and slough  Instrument(s) utilized: forceps and scissors  Bleeding: small  Hemostasis obtained with: not applicable  Procedural pain (0-10): 5  Post-procedural pain: 1   Response to treatment: procedure was tolerated well    Debridement   Consent obtained? verbal  Consent given by: patient  Risks discussed? procedural risks discussed  Time out called at 8/26/2020 12:37 PM  Immediately prior to the procedure a time out was called  Performed by: physician  Debridement type: selective  Pain control: lidocaine 4%  Pre-debridement measurements  Length (cm): 0 2  Width (cm): 0 4  Depth (cm): 0 1  Surface Area (cm^2): 0 08  Volume (cm^3): 0 01    Post-debridement measurements  Length (cm): 0 2  Width (cm): 0 4  Depth (cm): 0 1  Percent debrided: 100%  Surface Area (cm^2): 0 08  Area debrided (cm^2): 0 08  Volume (cm^3): 0 01  Devitalized tissue debrided: exudate  Instrument(s) utilized: curette  Bleeding: none  Hemostasis obtained with: not applicable  Procedural pain (0-10): 2  Post-procedural pain: 0   Response to treatment: procedure was tolerated well          Plan:     Wound 07/29/20 Heel Right (Active)       Wound 07/29/20 Pretibial Left;Lateral (Active)       Wound 08/19/20 Knee Left;Medial (Active)       Subjective: Nestor Su Pt is here to follow up wound on B/L lower ext  One is on left stump from previous BKA and one is on rt heel   Pt is on abx from previous positive culture  Wound still smells on the foot, also not improving a great deal      The following portions of the patient's history were reviewed and updated as appropriate:   He  has a past medical history of Diabetes mellitus ([facility] ), Hyperlipidemia, and Hypertension    He   Patient Active Problem List    Diagnosis Date Noted    Unilateral complete BKA ([facility] ) 08/19/2020    Diabetes mellitus with ulcer of foot ([address] ) 08/19/2020    Ulcer of heel and midfoot, right, with fat layer exposed ([facility] ) 08/19/2020    Pressure ulcer of BKA stump, stage 2 ([facility] ) 05/04/2020    BMI 29 0-29 9,adult 04/11/2019    Gastroesophageal reflux disease without esophagitis 07/12/2018    [facility] mellitus [facility] disorder[facility] 12/24/2015    Amputated below knee ([facility] ) 10/22/2015    Male erectile disorder 07/02/2014    Peripheral arterial disease ([facility] ) 07/02/2014    Hyperlipidemia 02/14/2013    Anxiety disorder 09/04/2012    Benign essential hypertension 09/04/2012     He  has a past surgical history that includes Below knee leg amputation  His family history includes No Known Problems in his mother  He  reports that he quit smoking about 17 years ago  He has never used smokeless tobacco  He reports previous alcohol use  He reports previous drug use  Current Outpatient Medications   Medication Sig Dispense Refill    amLODIPine (NORVASC) 2 5 mg tablet Take 1 tablet (2 5 mg total) by mouth daily 90 tablet 3    CHUYITA MICROLET LANCETS lancets by Does not apply route      ciprofloxacin (CIPRO) 500 mg tablet Take 1 tablet (500 mg total) by mouth every 12 (twelve) hours for 10 days 20 tablet 0    collagenase (SANTYL) ointment Apply topically daily 90 g 0    glucose blood (CHUYITA CONTOUR TEST) test strip by In Vitro route      insulin glargine (LANTUS SOLOSTAR) 100 units/mL injection pen Inject 68 Units under the skin daily 25 pen 3    Insulin Pen Needle (B-D UF III MINI PEN NEEDLES) 31G X 5 MM MISC by Does not apply route daily 100 each 0    Insulin Pen Needle 31G X 5 MM MISC BD Ultra-Fine Mini Pen Needle 31 gauge x 3/16"      lisinopril (ZESTRIL) 40 mg tablet Take 1 tablet (40 mg total) by mouth daily 90 tablet 3    rosuvastatin (CRESTOR) 20 MG tablet Take 1 tablet (20 mg total) by mouth daily 90 tablet 3    sildenafil (VIAGRA) 100 mg tablet Take by mouth       No current facility-administered medications for this visit  He is allergic to bee venom and penicillins       Review of Systems   Constitutional: Negative for activity change, appetite change, chills, diaphoresis, fatigue, fever and unexpected weight change  HENT: Negative for congestion, dental problem, ear pain, mouth sores, sinus pressure, sinus pain, sore throat and trouble swallowing  Eyes: Negative for photophobia, discharge and itching  Respiratory: Negative for apnea, chest tightness and shortness of breath  Cardiovascular: Negative for chest pain, palpitations and leg swelling  Gastrointestinal: Negative for abdominal distention, abdominal pain, blood in stool, nausea and vomiting  Endocrine: Negative for cold intolerance, heat intolerance, polydipsia, polyphagia and polyuria  Genitourinary: Negative for difficulty urinating  Musculoskeletal: Negative for arthralgias  Skin: Positive for wound  Negative for color change  Neurological: Negative for dizziness, syncope, speech difficulty and headaches  Hematological: Negative for adenopathy  Psychiatric/Behavioral: Negative for agitation and behavioral problems  Objective:       Wound 07/29/20 Heel Right (Active)   Wound Image Images linked 08/26/20 1157       /80 (BP Location: Left arm, Patient Position: Sitting, Cuff Size: Standard)   Pulse 87   Temp 97 9 °F (36 6 °C) (Temporal)     Physical Exam  Vitals signs and nursing note reviewed  Constitutional:       General: He is not in acute distress  Appearance: He is well-developed  He is not ill-appearing, toxic-appearing or diaphoretic  HENT:      Head: Normocephalic and atraumatic  Nose: Nose normal    Eyes:      General:         Right eye: No discharge  Left eye: No discharge  Conjunctiva/sclera: Conjunctivae normal    Neck:      Musculoskeletal: Normal range of motion  Pulmonary:      Effort: Pulmonary effort is normal  No respiratory distress  Skin:     Comments: See wound analysis   Neurological:      Mental Status: He is alert             Wound Instructions:  Orders Placed This Encounter   Procedures    Wound off loading Off-loading Instructions:     Keep pressure and weight off wound as tolerable  Purchase off-loading shoe  Standing Status:   Future     Standing Expiration Date:   8/26/2021    Wound cleansing and dressings     Wound cleansing and dressings  Right Heel Wound   Wash your hands with soap and water  Remove old dressing, discard into plastic bag and place in trash  Cleanse the wound with Dakin's 1/4 strength prior to applying a clean dressing  Do not use tissue or cotton balls  Do not scrub the wound  Pat dry using gauze  - This was done today  Shower no      Apply Santyl to the right heel wound  Cover with gauze  Secure with bonilla and tape  Change dressing daily - This was done today - (Silvasorb Gel used today in wound care)    This was done today       Left Lateral Stump Wound    Wash your hands with soap and water  Remove old dressing, discard into plastic bag and place in trash  Cleanse the wound with normal saline prior to applying a clean dressing  Do not use tissue or cotton balls  Do not scrub the wound  Pat dry using gauze  - this was done today  Shower no  Apply hydrogel to the wounds  Cover with allevyn  Change dressing daily    Left Medial Knee Wound  Wash your hands with soap and water  Remove old dressing, discard into plastic bag and place in trash  Cleanse the wound with normal saline prior to applying a clean dressing  Do not use tissue or cotton balls  Do not scrub the wound  Pat dry using gauze  - this was done today  Shower no  Apply hydrogel to the wounds  Cover with allevyn  Change dressing daily    This was done today       Standing Status:   Future     Standing Expiration Date:   8/26/2021    Debridement     This order was created via procedure documentation    Debridement     This order was created via procedure documentation    Wound culture and Gram stain

## 2020-08-26 NOTE — ASSESSMENT & PLAN NOTE
Long conversation had with pt about HBO and surgical debridement  We debrided him today will have him cont abx    New culture taken today  Pt will see podiatry on Friday to eval for debridement in surgical suite

## 2020-08-28 ENCOUNTER — TELEPHONE (OUTPATIENT)
Dept: FAMILY MEDICINE CLINIC | Facility: CLINIC | Age: 55
End: 2020-08-28

## 2020-08-28 ENCOUNTER — OFFICE VISIT (OUTPATIENT)
Dept: WOUND CARE | Facility: HOSPITAL | Age: 55
End: 2020-08-28
Payer: COMMERCIAL

## 2020-08-28 VITALS
RESPIRATION RATE: 18 BRPM | SYSTOLIC BLOOD PRESSURE: 133 MMHG | HEART RATE: 76 BPM | DIASTOLIC BLOOD PRESSURE: 77 MMHG | TEMPERATURE: 98.1 F

## 2020-08-28 DIAGNOSIS — S88.119A AMPUTATED BELOW KNEE (HCC): ICD-10-CM

## 2020-08-28 DIAGNOSIS — E11.621 TYPE 2 DIABETES MELLITUS WITH FOOT ULCER, UNSPECIFIED WHETHER LONG TERM INSULIN USE (HCC): ICD-10-CM

## 2020-08-28 DIAGNOSIS — L97.412 ULCER OF HEEL AND MIDFOOT, RIGHT, WITH FAT LAYER EXPOSED (HCC): Primary | ICD-10-CM

## 2020-08-28 DIAGNOSIS — E11.42 DIABETIC POLYNEUROPATHY ASSOCIATED WITH TYPE 2 DIABETES MELLITUS (HCC): ICD-10-CM

## 2020-08-28 DIAGNOSIS — L97.509 TYPE 2 DIABETES MELLITUS WITH FOOT ULCER, UNSPECIFIED WHETHER LONG TERM INSULIN USE (HCC): ICD-10-CM

## 2020-08-28 LAB
BACTERIA WND AEROBE CULT: ABNORMAL
BACTERIA WND AEROBE CULT: ABNORMAL
GRAM STN SPEC: ABNORMAL

## 2020-08-28 PROCEDURE — 11042 DBRDMT SUBQ TIS 1ST 20SQCM/<: CPT | Performed by: PODIATRIST

## 2020-08-28 PROCEDURE — 99203 OFFICE O/P NEW LOW 30 MIN: CPT | Performed by: PODIATRIST

## 2020-08-28 RX ORDER — SULFAMETHOXAZOLE AND TRIMETHOPRIM 800; 160 MG/1; MG/1
1 TABLET ORAL EVERY 12 HOURS SCHEDULED
Qty: 20 TABLET | Refills: 0 | Status: SHIPPED | OUTPATIENT
Start: 2020-08-28 | End: 2020-09-07

## 2020-08-28 RX ORDER — LIDOCAINE HYDROCHLORIDE 40 MG/ML
5 SOLUTION TOPICAL ONCE
Status: COMPLETED | OUTPATIENT
Start: 2020-08-28 | End: 2020-08-28

## 2020-08-28 RX ADMIN — LIDOCAINE HYDROCHLORIDE 5 ML: 40 SOLUTION TOPICAL at 16:26

## 2020-08-28 NOTE — TELEPHONE ENCOUNTER
Called pt to discuss culture results    I will change his abx to bactrim ds for ten days , he is currently on cipro  Left message for pt to call back

## 2020-08-28 NOTE — PATIENT INSTRUCTIONS
Orders Placed This Encounter   Procedures    Wound cleansing and dressings     Right heel wound  Wash your hands with soap and water  Remove old dressing, discard into plastic bag and place in trash  Cleanse the wound with soap and water prior to applying a clean dressing  Do not use tissue or cotton balls  Do not scrub the wound  Pat dry using gauze  Shower yes with protection  Apply Iodosorb to the wound  Cover with ABD  Secure with bonilla and tape  Change dressing every other day  This was done today  Standing Status:   Future     Standing Expiration Date:   8/28/2021    Wound off loading     Off-loading Instructions:    Keep weight and pressure off wound at all times  , Wear off-loading device as directed by your physician  (Darco Heel offloading shoe)  Put on immediately when rising in the morning and remove when going to bed       Standing Status:   Future     Standing Expiration Date:   8/28/2021

## 2020-08-31 NOTE — RESULT ENCOUNTER NOTE
Message out for pt to call back so we can review results and initiate abx    If we cannot reach him I will discuss on wed at the wound center

## 2020-09-03 NOTE — PROGRESS NOTES
Patient ID: Andrew Frederick is a 60 y o  male Date of Birth 1965     Chief Complaint  Chief Complaint   Patient presents with    Follow Up Wound Care Visit     for right foot wound with podiatry  Allergies  Bee venom and Penicillins    Assessment/ Plan  Patient evaluated, treatment options discussed with the patient, excision debridement of wound using 15  Blade to healthy bleeding base, patient to start an oral antibiotic, patient educated and daily dressing changes, and the offloading measures, patient return next week for re-evaluation, x-rays evaluated negative for osteomyelitis       Diagnoses and all orders for this visit:    Ulcer of heel and midfoot, right, with fat layer exposed ([facility] )  -     lidocaine (XYLOCAINE) 4 % topical solution 5 mL  -     Wound cleansing and dressings; Future  -     Wound off loading; Future  -     sulfamethoxazole-trimethoprim (BACTRIM DS) 800-160 mg per tablet; Take 1 tablet by mouth every 12 (twelve) hours for 10 days  -     cadexomer iodine (IODOSORB) 0 9 % gel;  Apply 1 application topically daily as needed for wound care    Type 2 diabetes mellitus with foot ulcer, unspecified whether long term insulin use ([facility] )    Diabetic polyneuropathy associated with type 2 diabetes mellitus ([facility] )              Debridement   Wound 07/29/20 Heel Right    Consent obtained? verbal  Consent given by: patient    Performed by: physician  Debridement type: surgical  Level of debridement: subcutaneous tissue    Pre-debridement measurements  Length (cm): 4 2  Width (cm): 4  Depth (cm): 1  Surface Area (cm^2): 16 8  Volume (cm^3): 16 8    Post-debridement measurements  Length (cm): 4 2  Width (cm): 4 1  Depth (cm): 1 2  Percent debrided: 85%  Surface Area (cm^2): 17 22  Area debrided (cm^2): 14 64  Volume (cm^3): 20 66  Tissue and other material debrided: subcutaneous tissue  Devitalized tissue debrided: biofilm, callus, clots, exudate and fibrin  Instrument(s) utilized: blade, curette and forceps  Bleeding: medium  Hemostasis obtained with: pressure  Procedural pain (0-10): 0  Post-procedural pain: 0   Response to treatment: procedure was tolerated well                 Subjective:        Diabetic patient, referred to Podiatry for right heel ulceration, patient has a history of partial foot amputation to the left, patient denies trauma to the foot, patient denies constitutional symptoms, patient states compliance with daily dressing changes, patient had recent x-rays ruling out osteomyelitis of right heel      The following portions of the patient's history were reviewed and updated as appropriate: allergies, current medications, past family history, past medical history, past social history, past surgical history, and problem list     Review of Systems   Constitutional: Negative  Respiratory: Negative  Cardiovascular: Negative  Musculoskeletal: Positive for gait problem  Skin: Positive for color change and wound  Objective:            /77   Pulse 76   Temp 98 1 °F (36 7 °C)   Resp 18     Physical Exam  Constitutional:       General: He is not in acute distress  Appearance: He is well-developed  He is not ill-appearing, toxic-appearing or diaphoretic  HENT:      Head: Normocephalic  Cardiovascular:      Pulses:           Dorsalis pedis pulses are 1+ on the right side and 1+ on the left side  Posterior tibial pulses are 0 on the right side and 0 on the left side  Comments: Palpable DP pulse, nonpalpable PT pulse, CFT is less than 3 seconds, temperature gradient within normal limit, a trophic skin changes noted with skin thinning in shiny, patient report occasional claudication to bilateral calf, localized edema foot and ankle Q9  Pulmonary:      Effort: Pulmonary effort is normal    Musculoskeletal:         General: Tenderness and deformity present        Comments: Pes planus type foot pain on palpation and range of motion of the 1st MPJ, metatarsal heads bilateral foot, pain on palpation on range of motion of the ST joint, crepitus noted in midfoot joint  Skin:     General: Skin is dry  Capillary Refill: Capillary refill takes 2 to 3 seconds  Findings: Wound present  Comments: Trophic skin changes bilateral foot and ankle, alternating hypopigmentation and hyperpigmentation patches around the foot and ankle on anterior leg, skin is shiny and thin, absent hair growth, atrophy of fat pad  Right heel full-thickness wound, fibro granular, with macerated edges, mild malodor, serous drainage, slough noted, no probe to bone, wound description is in wound analysis section   Neurological:      Mental Status: He is alert and oriented to person, place, and time  Sensory: Sensory deficit present  Motor: Atrophy present  Deep Tendon Reflexes: Reflexes abnormal            Wound Instructions:  Orders Placed This Encounter   Procedures    Wound cleansing and dressings     Right heel wound  Wash your hands with soap and water  Remove old dressing, discard into plastic bag and place in trash  Cleanse the wound with soap and water prior to applying a clean dressing  Do not use tissue or cotton balls  Do not scrub the wound  Pat dry using gauze  Shower yes with protection  Apply Iodosorb to the wound  Cover with ABD  Secure with bonilla and tape  Change dressing every other day  This was done today  Standing Status:   Future     Standing Expiration Date:   8/28/2021    Wound off loading     Off-loading Instructions:    Keep weight and pressure off wound at all times  , Wear off-loading device as directed by your physician  (Darco Heel offloading shoe)  Put on immediately when rising in the morning and remove when going to bed       Standing Status:   Future     Standing Expiration Date:   8/28/2021

## 2020-09-04 ENCOUNTER — TELEPHONE (OUTPATIENT)
Dept: WOUND CARE | Facility: HOSPITAL | Age: 55
End: 2020-09-04

## 2020-09-11 ENCOUNTER — APPOINTMENT (OUTPATIENT)
Dept: RADIOLOGY | Age: 55
End: 2020-09-11
Attending: PREVENTIVE MEDICINE

## 2020-09-11 ENCOUNTER — APPOINTMENT (OUTPATIENT)
Dept: LAB | Age: 55
End: 2020-09-11
Attending: PREVENTIVE MEDICINE

## 2020-09-11 ENCOUNTER — TRANSCRIBE ORDERS (OUTPATIENT)
Dept: ADMINISTRATIVE | Age: 55
End: 2020-09-11

## 2020-09-11 DIAGNOSIS — Z00.00 ROUTINE CHECK-UP: Primary | ICD-10-CM

## 2020-09-11 DIAGNOSIS — Z00.00 ROUTINE CHECK-UP: ICD-10-CM

## 2020-09-11 LAB
BACTERIA UR QL AUTO: ABNORMAL /HPF
BASOPHILS # BLD AUTO: 0.04 THOUSANDS/ΜL (ref 0–0.1)
BASOPHILS NFR BLD AUTO: 1 % (ref 0–1)
BILIRUB UR QL STRIP: ABNORMAL
BUN SERPL-MCNC: 23 MG/DL (ref 5–25)
CLARITY UR: CLEAR
COLOR UR: ABNORMAL
CREAT SERPL-MCNC: 1.04 MG/DL (ref 0.6–1.3)
EOSINOPHIL # BLD AUTO: 0.18 THOUSAND/ΜL (ref 0–0.61)
EOSINOPHIL NFR BLD AUTO: 3 % (ref 0–6)
ERYTHROCYTE [DISTWIDTH] IN BLOOD BY AUTOMATED COUNT: 13.2 % (ref 11.6–15.1)
GFR SERPL CREATININE-BSD FRML MDRD: 79 ML/MIN/1.73SQ M
GLUCOSE UR STRIP-MCNC: NEGATIVE MG/DL
HCT VFR BLD AUTO: 36.9 % (ref 36.5–49.3)
HGB BLD-MCNC: 12 G/DL (ref 12–17)
HGB UR QL STRIP.AUTO: NEGATIVE
HYALINE CASTS #/AREA URNS LPF: ABNORMAL /LPF
IMM GRANULOCYTES # BLD AUTO: 0.02 THOUSAND/UL (ref 0–0.2)
IMM GRANULOCYTES NFR BLD AUTO: 0 % (ref 0–2)
KETONES UR STRIP-MCNC: NEGATIVE MG/DL
LEUKOCYTE ESTERASE UR QL STRIP: NEGATIVE
LYMPHOCYTES # BLD AUTO: 0.86 THOUSANDS/ΜL (ref 0.6–4.47)
LYMPHOCYTES NFR BLD AUTO: 16 % (ref 14–44)
MCH RBC QN AUTO: 30.3 PG (ref 26.8–34.3)
MCHC RBC AUTO-ENTMCNC: 32.5 G/DL (ref 31.4–37.4)
MCV RBC AUTO: 93 FL (ref 82–98)
MONOCYTES # BLD AUTO: 0.42 THOUSAND/ΜL (ref 0.17–1.22)
MONOCYTES NFR BLD AUTO: 8 % (ref 4–12)
NEUTROPHILS # BLD AUTO: 3.78 THOUSANDS/ΜL (ref 1.85–7.62)
NEUTS SEG NFR BLD AUTO: 72 % (ref 43–75)
NITRITE UR QL STRIP: NEGATIVE
NON-SQ EPI CELLS URNS QL MICRO: ABNORMAL /HPF
NRBC BLD AUTO-RTO: 0 /100 WBCS
PH UR STRIP.AUTO: 5.5 [PH]
PLATELET # BLD AUTO: 262 THOUSANDS/UL (ref 149–390)
PMV BLD AUTO: 9.7 FL (ref 8.9–12.7)
PROT UR STRIP-MCNC: ABNORMAL MG/DL
RBC # BLD AUTO: 3.96 MILLION/UL (ref 3.88–5.62)
RBC #/AREA URNS AUTO: ABNORMAL /HPF
SP GR UR STRIP.AUTO: 1.02 (ref 1–1.03)
UROBILINOGEN UR QL STRIP.AUTO: 1 E.U./DL
WBC # BLD AUTO: 5.3 THOUSAND/UL (ref 4.31–10.16)
WBC #/AREA URNS AUTO: ABNORMAL /HPF

## 2020-09-11 PROCEDURE — 82300 ASSAY OF CADMIUM: CPT

## 2020-09-11 PROCEDURE — 82232 ASSAY OF BETA-2 PROTEIN: CPT | Performed by: PREVENTIVE MEDICINE

## 2020-09-11 PROCEDURE — 86870 RBC ANTIBODY IDENTIFICATION: CPT

## 2020-09-11 PROCEDURE — 82565 ASSAY OF CREATININE: CPT

## 2020-09-11 PROCEDURE — 85025 COMPLETE CBC W/AUTO DIFF WBC: CPT

## 2020-09-11 PROCEDURE — 81001 URINALYSIS AUTO W/SCOPE: CPT | Performed by: PREVENTIVE MEDICINE

## 2020-09-11 PROCEDURE — 82175 ASSAY OF ARSENIC: CPT | Performed by: PREVENTIVE MEDICINE

## 2020-09-11 PROCEDURE — 84520 ASSAY OF UREA NITROGEN: CPT

## 2020-09-11 PROCEDURE — 83825 ASSAY OF MERCURY: CPT | Performed by: PREVENTIVE MEDICINE

## 2020-09-11 PROCEDURE — 71045 X-RAY EXAM CHEST 1 VIEW: CPT

## 2020-09-11 PROCEDURE — 83655 ASSAY OF LEAD: CPT | Performed by: PREVENTIVE MEDICINE

## 2020-09-11 PROCEDURE — 84202 ASSAY RBC PROTOPORPHYRIN: CPT

## 2020-09-11 PROCEDURE — 82570 ASSAY OF URINE CREATININE: CPT | Performed by: PREVENTIVE MEDICINE

## 2020-09-14 LAB
B2 MICROGLOB UR-MCNC: <10 UG/L (ref 0–300)
CADMIUM BLD-MCNC: <0.5 UG/L (ref 0–1.2)

## 2020-09-15 LAB
ARSENIC 24H UR-MCNC: 12 UG/L (ref 0–50)
CREAT UR-MCNC: 1.86 G/L (ref 0.3–3)
INORG ARSENIC UR-MCNC: NORMAL UG/L (ref 0–19)
LEAD 24H UR-MCNC: NORMAL UG/L (ref 0–49)
LEAD BLD-MCNC: 1 UG/DL (ref 0–4)
MERCURY 24H UR-MCNC: NORMAL UG/L (ref 0–19)
ZPP RBC-MCNC: 32 UG/DL (ref 0–99)

## 2020-09-28 ENCOUNTER — HOSPITAL ENCOUNTER (EMERGENCY)
Facility: HOSPITAL | Age: 55
Discharge: HOME | End: 2020-09-28
Attending: EMERGENCY MEDICINE | Admitting: EMERGENCY MEDICINE
Payer: COMMERCIAL

## 2020-09-28 VITALS
RESPIRATION RATE: 16 BRPM | TEMPERATURE: 97.3 F | SYSTOLIC BLOOD PRESSURE: 101 MMHG | WEIGHT: 173.5 LBS | OXYGEN SATURATION: 99 % | DIASTOLIC BLOOD PRESSURE: 59 MMHG | HEART RATE: 83 BPM | BODY MASS INDEX: 27.17 KG/M2

## 2020-09-28 DIAGNOSIS — Z51.89 VISIT FOR WOUND CHECK: Primary | ICD-10-CM

## 2020-09-28 PROCEDURE — 99282 EMERGENCY DEPT VISIT SF MDM: CPT

## 2020-09-28 PROCEDURE — 99284 EMERGENCY DEPT VISIT MOD MDM: CPT | Performed by: PHYSICIAN ASSISTANT

## 2020-10-02 ENCOUNTER — OFFICE VISIT (OUTPATIENT)
Dept: WOUND CARE | Facility: HOSPITAL | Age: 55
End: 2020-10-02
Payer: COMMERCIAL

## 2020-10-02 VITALS
RESPIRATION RATE: 16 BRPM | SYSTOLIC BLOOD PRESSURE: 123 MMHG | TEMPERATURE: 99.2 F | HEART RATE: 78 BPM | DIASTOLIC BLOOD PRESSURE: 81 MMHG

## 2020-10-02 DIAGNOSIS — E11.42 DIABETIC POLYNEUROPATHY ASSOCIATED WITH TYPE 2 DIABETES MELLITUS (HCC): ICD-10-CM

## 2020-10-02 DIAGNOSIS — L97.922 ULCER OF LEFT LOWER EXTREMITY WITH FAT LAYER EXPOSED (HCC): ICD-10-CM

## 2020-10-02 DIAGNOSIS — E11.621 TYPE 2 DIABETES MELLITUS WITH FOOT ULCER, UNSPECIFIED WHETHER LONG TERM INSULIN USE (HCC): ICD-10-CM

## 2020-10-02 DIAGNOSIS — L03.115 CELLULITIS OF RIGHT FOOT: Primary | ICD-10-CM

## 2020-10-02 DIAGNOSIS — S88.119A AMPUTATED BELOW KNEE (HCC): ICD-10-CM

## 2020-10-02 DIAGNOSIS — L97.412 ULCER OF HEEL AND MIDFOOT, RIGHT, WITH FAT LAYER EXPOSED (HCC): ICD-10-CM

## 2020-10-02 DIAGNOSIS — L97.509 TYPE 2 DIABETES MELLITUS WITH FOOT ULCER, UNSPECIFIED WHETHER LONG TERM INSULIN USE (HCC): ICD-10-CM

## 2020-10-02 PROCEDURE — 11042 DBRDMT SUBQ TIS 1ST 20SQCM/<: CPT | Performed by: PODIATRIST

## 2020-10-02 PROCEDURE — 99213 OFFICE O/P EST LOW 20 MIN: CPT | Performed by: PODIATRIST

## 2020-10-02 RX ORDER — SULFAMETHOXAZOLE AND TRIMETHOPRIM 800; 160 MG/1; MG/1
1 TABLET ORAL 2 TIMES DAILY
Qty: 6 TABLET | Refills: 0 | Status: SHIPPED | OUTPATIENT
Start: 2020-10-02 | End: 2020-10-05

## 2020-10-02 RX ORDER — LIDOCAINE HYDROCHLORIDE 40 MG/ML
5 SOLUTION TOPICAL ONCE
Status: COMPLETED | OUTPATIENT
Start: 2020-10-02 | End: 2020-10-02

## 2020-10-02 RX ADMIN — LIDOCAINE HYDROCHLORIDE 5 ML: 40 SOLUTION TOPICAL at 15:53

## 2020-10-06 RX ORDER — SULFAMETHOXAZOLE AND TRIMETHOPRIM 800; 160 MG/1; MG/1
1 TABLET ORAL EVERY 12 HOURS SCHEDULED
Qty: 14 TABLET | Refills: 0 | Status: SHIPPED | OUTPATIENT
Start: 2020-10-06 | End: 2020-10-13

## 2020-10-07 ENCOUNTER — TRANSCRIBE ORDERS (OUTPATIENT)
Dept: ADMINISTRATIVE | Facility: HOSPITAL | Age: 55
End: 2020-10-07

## 2020-10-07 ENCOUNTER — HOSPITAL ENCOUNTER (OUTPATIENT)
Dept: RADIOLOGY | Facility: HOSPITAL | Age: 55
Discharge: HOME | End: 2020-10-07
Attending: PODIATRIST
Payer: COMMERCIAL

## 2020-10-07 DIAGNOSIS — L97.922 ULCER OF LEFT LOWER EXTREMITY WITH FAT LAYER EXPOSED (HCC): ICD-10-CM

## 2020-10-07 DIAGNOSIS — L97.412 ULCER OF HEEL AND MIDFOOT, RIGHT, WITH FAT LAYER EXPOSED (HCC): ICD-10-CM

## 2020-10-07 PROCEDURE — 73590 X-RAY EXAM OF LOWER LEG: CPT

## 2020-10-07 PROCEDURE — 73630 X-RAY EXAM OF FOOT: CPT

## 2020-10-08 ENCOUNTER — OFFICE VISIT (OUTPATIENT)
Dept: WOUND CARE | Facility: HOSPITAL | Age: 55
End: 2020-10-08
Payer: COMMERCIAL

## 2020-10-08 VITALS
HEART RATE: 86 BPM | RESPIRATION RATE: 20 BRPM | SYSTOLIC BLOOD PRESSURE: 158 MMHG | DIASTOLIC BLOOD PRESSURE: 83 MMHG | TEMPERATURE: 98.7 F

## 2020-10-08 DIAGNOSIS — L97.509 TYPE 2 DIABETES MELLITUS WITH FOOT ULCER, UNSPECIFIED WHETHER LONG TERM INSULIN USE (HCC): Primary | ICD-10-CM

## 2020-10-08 DIAGNOSIS — E11.42 DIABETIC POLYNEUROPATHY ASSOCIATED WITH TYPE 2 DIABETES MELLITUS (HCC): ICD-10-CM

## 2020-10-08 DIAGNOSIS — E11.621 TYPE 2 DIABETES MELLITUS WITH FOOT ULCER, UNSPECIFIED WHETHER LONG TERM INSULIN USE (HCC): Primary | ICD-10-CM

## 2020-10-08 DIAGNOSIS — L97.418 NON-PRESSURE CHRONIC ULCER OF RIGHT HEEL AND MIDFOOT WITH OTHER SPECIFIED SEVERITY (HCC): ICD-10-CM

## 2020-10-08 PROCEDURE — 99214 OFFICE O/P EST MOD 30 MIN: CPT | Performed by: PREVENTIVE MEDICINE

## 2020-10-08 PROCEDURE — 99213 OFFICE O/P EST LOW 20 MIN: CPT | Performed by: PREVENTIVE MEDICINE

## 2020-10-08 PROCEDURE — G0463 HOSPITAL OUTPT CLINIC VISIT: HCPCS | Performed by: PREVENTIVE MEDICINE

## 2020-10-09 ENCOUNTER — OFFICE VISIT (OUTPATIENT)
Dept: WOUND CARE | Facility: HOSPITAL | Age: 55
End: 2020-10-09
Payer: COMMERCIAL

## 2020-10-09 ENCOUNTER — OFFICE VISIT (OUTPATIENT)
Dept: LAB | Facility: HOSPITAL | Age: 55
End: 2020-10-09
Attending: PREVENTIVE MEDICINE
Payer: COMMERCIAL

## 2020-10-09 ENCOUNTER — HOSPITAL ENCOUNTER (OUTPATIENT)
Dept: RADIOLOGY | Facility: HOSPITAL | Age: 55
Discharge: HOME | End: 2020-10-09
Attending: PREVENTIVE MEDICINE
Payer: COMMERCIAL

## 2020-10-09 VITALS
HEART RATE: 79 BPM | SYSTOLIC BLOOD PRESSURE: 139 MMHG | RESPIRATION RATE: 18 BRPM | TEMPERATURE: 96.6 F | DIASTOLIC BLOOD PRESSURE: 76 MMHG

## 2020-10-09 DIAGNOSIS — L97.922 ULCER OF LEFT LOWER EXTREMITY WITH FAT LAYER EXPOSED (HCC): ICD-10-CM

## 2020-10-09 DIAGNOSIS — E11.621 TYPE 2 DIABETES MELLITUS WITH FOOT ULCER, UNSPECIFIED WHETHER LONG TERM INSULIN USE (HCC): ICD-10-CM

## 2020-10-09 DIAGNOSIS — S88.119A AMPUTATED BELOW KNEE (HCC): ICD-10-CM

## 2020-10-09 DIAGNOSIS — E11.621 TYPE 2 DIABETES MELLITUS WITH FOOT ULCER, UNSPECIFIED WHETHER LONG TERM INSULIN USE (HCC): Primary | ICD-10-CM

## 2020-10-09 DIAGNOSIS — L97.418 NON-PRESSURE CHRONIC ULCER OF RIGHT HEEL AND MIDFOOT WITH OTHER SPECIFIED SEVERITY (HCC): ICD-10-CM

## 2020-10-09 DIAGNOSIS — L97.509 TYPE 2 DIABETES MELLITUS WITH FOOT ULCER, UNSPECIFIED WHETHER LONG TERM INSULIN USE (HCC): ICD-10-CM

## 2020-10-09 DIAGNOSIS — E11.42 DIABETIC POLYNEUROPATHY ASSOCIATED WITH TYPE 2 DIABETES MELLITUS (HCC): ICD-10-CM

## 2020-10-09 DIAGNOSIS — L89.892 PRESSURE ULCER OF BKA STUMP, STAGE 2 (HCC): ICD-10-CM

## 2020-10-09 DIAGNOSIS — L97.412 ULCER OF HEEL AND MIDFOOT, RIGHT, WITH FAT LAYER EXPOSED (HCC): ICD-10-CM

## 2020-10-09 DIAGNOSIS — S88.119A: ICD-10-CM

## 2020-10-09 DIAGNOSIS — T87.89 PRESSURE ULCER OF BKA STUMP, STAGE 2 (HCC): ICD-10-CM

## 2020-10-09 DIAGNOSIS — L97.509 TYPE 2 DIABETES MELLITUS WITH FOOT ULCER, UNSPECIFIED WHETHER LONG TERM INSULIN USE (HCC): Primary | ICD-10-CM

## 2020-10-09 PROCEDURE — 11042 DBRDMT SUBQ TIS 1ST 20SQCM/<: CPT | Performed by: PODIATRIST

## 2020-10-09 PROCEDURE — 71046 X-RAY EXAM CHEST 2 VIEWS: CPT

## 2020-10-09 PROCEDURE — 93005 ELECTROCARDIOGRAM TRACING: CPT

## 2020-10-09 RX ORDER — LIDOCAINE HYDROCHLORIDE 40 MG/ML
5 SOLUTION TOPICAL ONCE
Status: COMPLETED | OUTPATIENT
Start: 2020-10-09 | End: 2020-10-09

## 2020-10-09 RX ADMIN — LIDOCAINE HYDROCHLORIDE 5 ML: 40 SOLUTION TOPICAL at 16:15

## 2020-10-12 LAB
ATRIAL RATE: 67 BPM
P AXIS: 60 DEGREES
PR INTERVAL: 144 MS
QRS AXIS: 24 DEGREES
QRSD INTERVAL: 84 MS
QT INTERVAL: 378 MS
QTC INTERVAL: 399 MS
T WAVE AXIS: 106 DEGREES
VENTRICULAR RATE: 67 BPM

## 2020-10-12 PROCEDURE — 93010 ELECTROCARDIOGRAM REPORT: CPT | Performed by: INTERNAL MEDICINE

## 2020-10-14 ENCOUNTER — HOSPITAL ENCOUNTER (OUTPATIENT)
Dept: RADIOLOGY | Facility: HOSPITAL | Age: 55
Discharge: HOME | End: 2020-10-14
Attending: FAMILY MEDICINE
Payer: COMMERCIAL

## 2020-10-14 ENCOUNTER — TELEPHONE (OUTPATIENT)
Dept: FAMILY MEDICINE CLINIC | Facility: CLINIC | Age: 55
End: 2020-10-14

## 2020-10-14 DIAGNOSIS — L97.412 ULCER OF HEEL AND MIDFOOT, RIGHT, WITH FAT LAYER EXPOSED (HCC): ICD-10-CM

## 2020-10-14 PROCEDURE — 93926 LOWER EXTREMITY STUDY: CPT

## 2020-10-14 PROCEDURE — 93926 LOWER EXTREMITY STUDY: CPT | Performed by: SURGERY

## 2020-10-14 PROCEDURE — 93922 UPR/L XTREMITY ART 2 LEVELS: CPT | Performed by: SURGERY

## 2020-10-16 ENCOUNTER — OFFICE VISIT (OUTPATIENT)
Dept: WOUND CARE | Facility: HOSPITAL | Age: 55
End: 2020-10-16
Payer: COMMERCIAL

## 2020-10-16 VITALS
SYSTOLIC BLOOD PRESSURE: 175 MMHG | TEMPERATURE: 97.8 F | DIASTOLIC BLOOD PRESSURE: 84 MMHG | RESPIRATION RATE: 18 BRPM | HEART RATE: 81 BPM

## 2020-10-16 DIAGNOSIS — Z89.512 HX OF BKA, LEFT (HCC): ICD-10-CM

## 2020-10-16 DIAGNOSIS — T87.89 PRESSURE ULCER OF BKA STUMP, STAGE 2 (HCC): ICD-10-CM

## 2020-10-16 DIAGNOSIS — L97.412 ULCER OF RIGHT HEEL, WITH FAT LAYER EXPOSED (HCC): ICD-10-CM

## 2020-10-16 DIAGNOSIS — E11.621 TYPE 2 DIABETES MELLITUS WITH FOOT ULCER, UNSPECIFIED WHETHER LONG TERM INSULIN USE (HCC): Primary | ICD-10-CM

## 2020-10-16 DIAGNOSIS — L97.509 TYPE 2 DIABETES MELLITUS WITH FOOT ULCER, UNSPECIFIED WHETHER LONG TERM INSULIN USE (HCC): Primary | ICD-10-CM

## 2020-10-16 DIAGNOSIS — L89.892 PRESSURE ULCER OF BKA STUMP, STAGE 2 (HCC): ICD-10-CM

## 2020-10-16 DIAGNOSIS — E11.42 DIABETIC POLYNEUROPATHY ASSOCIATED WITH TYPE 2 DIABETES MELLITUS (HCC): ICD-10-CM

## 2020-10-16 PROCEDURE — 11042 DBRDMT SUBQ TIS 1ST 20SQCM/<: CPT | Performed by: PODIATRIST

## 2020-10-16 RX ORDER — LIDOCAINE HYDROCHLORIDE 40 MG/ML
5 SOLUTION TOPICAL ONCE
Status: COMPLETED | OUTPATIENT
Start: 2020-10-16 | End: 2020-10-16

## 2020-10-16 RX ADMIN — LIDOCAINE HYDROCHLORIDE 5 ML: 40 SOLUTION TOPICAL at 16:00

## 2020-10-21 ENCOUNTER — OFFICE VISIT (OUTPATIENT)
Dept: WOUND CARE | Facility: HOSPITAL | Age: 55
End: 2020-10-21
Payer: COMMERCIAL

## 2020-10-21 VITALS
TEMPERATURE: 97.2 F | HEART RATE: 87 BPM | DIASTOLIC BLOOD PRESSURE: 87 MMHG | SYSTOLIC BLOOD PRESSURE: 132 MMHG | RESPIRATION RATE: 18 BRPM

## 2020-10-21 DIAGNOSIS — L97.418 NON-PRESSURE CHRONIC ULCER OF RIGHT HEEL AND MIDFOOT WITH OTHER SPECIFIED SEVERITY (HCC): Primary | ICD-10-CM

## 2020-10-21 DIAGNOSIS — E11.621 TYPE 2 DIABETES MELLITUS WITH FOOT ULCER, UNSPECIFIED WHETHER LONG TERM INSULIN USE (HCC): ICD-10-CM

## 2020-10-21 DIAGNOSIS — L97.509 TYPE 2 DIABETES MELLITUS WITH FOOT ULCER, UNSPECIFIED WHETHER LONG TERM INSULIN USE (HCC): ICD-10-CM

## 2020-10-21 LAB
GLUCOSE SERPL-MCNC: 233 MG/DL (ref 65–140)
GLUCOSE SERPL-MCNC: 315 MG/DL (ref 65–140)

## 2020-10-21 PROCEDURE — G0277 HBOT, FULL BODY CHAMBER, 30M: HCPCS | Performed by: FAMILY MEDICINE

## 2020-10-21 PROCEDURE — 82948 REAGENT STRIP/BLOOD GLUCOSE: CPT | Performed by: FAMILY MEDICINE

## 2020-10-21 PROCEDURE — 99183 HYPERBARIC OXYGEN THERAPY: CPT | Performed by: FAMILY MEDICINE

## 2020-10-22 ENCOUNTER — OFFICE VISIT (OUTPATIENT)
Dept: WOUND CARE | Facility: HOSPITAL | Age: 55
End: 2020-10-22
Payer: COMMERCIAL

## 2020-10-22 VITALS
HEART RATE: 79 BPM | TEMPERATURE: 97.2 F | DIASTOLIC BLOOD PRESSURE: 71 MMHG | RESPIRATION RATE: 18 BRPM | SYSTOLIC BLOOD PRESSURE: 115 MMHG

## 2020-10-22 DIAGNOSIS — E11.621 TYPE 2 DIABETES MELLITUS WITH FOOT ULCER, UNSPECIFIED WHETHER LONG TERM INSULIN USE (HCC): Primary | ICD-10-CM

## 2020-10-22 DIAGNOSIS — L97.509 TYPE 2 DIABETES MELLITUS WITH FOOT ULCER, UNSPECIFIED WHETHER LONG TERM INSULIN USE (HCC): Primary | ICD-10-CM

## 2020-10-22 DIAGNOSIS — L97.418 NON-PRESSURE CHRONIC ULCER OF RIGHT HEEL AND MIDFOOT WITH OTHER SPECIFIED SEVERITY (HCC): ICD-10-CM

## 2020-10-22 LAB
GLUCOSE SERPL-MCNC: 213 MG/DL (ref 65–140)
GLUCOSE SERPL-MCNC: 328 MG/DL (ref 65–140)

## 2020-10-22 PROCEDURE — G0277 HBOT, FULL BODY CHAMBER, 30M: HCPCS | Performed by: PREVENTIVE MEDICINE

## 2020-10-22 PROCEDURE — 82948 REAGENT STRIP/BLOOD GLUCOSE: CPT | Performed by: PREVENTIVE MEDICINE

## 2020-10-22 PROCEDURE — 99183 HYPERBARIC OXYGEN THERAPY: CPT | Performed by: PREVENTIVE MEDICINE

## 2020-10-23 ENCOUNTER — OFFICE VISIT (OUTPATIENT)
Dept: WOUND CARE | Facility: HOSPITAL | Age: 55
End: 2020-10-23
Payer: COMMERCIAL

## 2020-10-23 VITALS
HEART RATE: 78 BPM | RESPIRATION RATE: 18 BRPM | TEMPERATURE: 97.2 F | SYSTOLIC BLOOD PRESSURE: 167 MMHG | DIASTOLIC BLOOD PRESSURE: 84 MMHG

## 2020-10-23 VITALS
HEART RATE: 80 BPM | SYSTOLIC BLOOD PRESSURE: 139 MMHG | DIASTOLIC BLOOD PRESSURE: 77 MMHG | TEMPERATURE: 97.8 F | RESPIRATION RATE: 14 BRPM

## 2020-10-23 DIAGNOSIS — E11.42 DIABETIC POLYNEUROPATHY ASSOCIATED WITH TYPE 2 DIABETES MELLITUS (HCC): ICD-10-CM

## 2020-10-23 DIAGNOSIS — L89.892 PRESSURE ULCER OF BKA STUMP, STAGE 2 (HCC): ICD-10-CM

## 2020-10-23 DIAGNOSIS — E11.621 TYPE 2 DIABETES MELLITUS WITH FOOT ULCER, UNSPECIFIED WHETHER LONG TERM INSULIN USE (HCC): Primary | ICD-10-CM

## 2020-10-23 DIAGNOSIS — L97.509 TYPE 2 DIABETES MELLITUS WITH FOOT ULCER, UNSPECIFIED WHETHER LONG TERM INSULIN USE (HCC): Primary | ICD-10-CM

## 2020-10-23 DIAGNOSIS — T87.89 PRESSURE ULCER OF BKA STUMP, STAGE 2 (HCC): ICD-10-CM

## 2020-10-23 DIAGNOSIS — L97.412 ULCER OF RIGHT HEEL, WITH FAT LAYER EXPOSED (HCC): ICD-10-CM

## 2020-10-23 DIAGNOSIS — Z89.512 HX OF BKA, LEFT (HCC): ICD-10-CM

## 2020-10-23 DIAGNOSIS — L97.418 NON-PRESSURE CHRONIC ULCER OF RIGHT HEEL AND MIDFOOT WITH OTHER SPECIFIED SEVERITY (HCC): ICD-10-CM

## 2020-10-23 LAB
GLUCOSE SERPL-MCNC: 121 MG/DL (ref 65–140)
GLUCOSE SERPL-MCNC: 79 MG/DL (ref 65–140)

## 2020-10-23 PROCEDURE — 11042 DBRDMT SUBQ TIS 1ST 20SQCM/<: CPT | Performed by: PODIATRIST

## 2020-10-23 PROCEDURE — 82948 REAGENT STRIP/BLOOD GLUCOSE: CPT | Performed by: NURSE PRACTITIONER

## 2020-10-23 PROCEDURE — 99183 HYPERBARIC OXYGEN THERAPY: CPT | Performed by: NURSE PRACTITIONER

## 2020-10-23 PROCEDURE — G0277 HBOT, FULL BODY CHAMBER, 30M: HCPCS | Performed by: NURSE PRACTITIONER

## 2020-10-23 RX ORDER — LIDOCAINE HYDROCHLORIDE 40 MG/ML
5 SOLUTION TOPICAL ONCE
Status: COMPLETED | OUTPATIENT
Start: 2020-10-23 | End: 2020-10-23

## 2020-10-23 RX ADMIN — LIDOCAINE HYDROCHLORIDE 5 ML: 40 SOLUTION TOPICAL at 15:22

## 2020-10-26 ENCOUNTER — OFFICE VISIT (OUTPATIENT)
Dept: WOUND CARE | Facility: HOSPITAL | Age: 55
End: 2020-10-26
Payer: COMMERCIAL

## 2020-10-26 VITALS
TEMPERATURE: 97.4 F | HEART RATE: 84 BPM | RESPIRATION RATE: 18 BRPM | DIASTOLIC BLOOD PRESSURE: 82 MMHG | SYSTOLIC BLOOD PRESSURE: 160 MMHG

## 2020-10-26 DIAGNOSIS — L97.509 TYPE 2 DIABETES MELLITUS WITH FOOT ULCER, UNSPECIFIED WHETHER LONG TERM INSULIN USE (HCC): Primary | ICD-10-CM

## 2020-10-26 DIAGNOSIS — L97.418 NON-PRESSURE CHRONIC ULCER OF RIGHT HEEL AND MIDFOOT WITH OTHER SPECIFIED SEVERITY (HCC): ICD-10-CM

## 2020-10-26 DIAGNOSIS — E11.621 TYPE 2 DIABETES MELLITUS WITH FOOT ULCER, UNSPECIFIED WHETHER LONG TERM INSULIN USE (HCC): Primary | ICD-10-CM

## 2020-10-26 LAB
GLUCOSE SERPL-MCNC: 138 MG/DL (ref 65–140)
GLUCOSE SERPL-MCNC: 150 MG/DL (ref 65–140)

## 2020-10-26 PROCEDURE — 99183 HYPERBARIC OXYGEN THERAPY: CPT | Performed by: PREVENTIVE MEDICINE

## 2020-10-26 PROCEDURE — 82948 REAGENT STRIP/BLOOD GLUCOSE: CPT | Performed by: PREVENTIVE MEDICINE

## 2020-10-26 PROCEDURE — G0277 HBOT, FULL BODY CHAMBER, 30M: HCPCS | Performed by: PREVENTIVE MEDICINE

## 2020-10-26 RX ORDER — OXYMETAZOLINE HYDROCHLORIDE 0.05 G/100ML
2 SPRAY NASAL DAILY
COMMUNITY
End: 2021-11-18

## 2020-10-27 ENCOUNTER — OFFICE VISIT (OUTPATIENT)
Dept: WOUND CARE | Facility: HOSPITAL | Age: 55
End: 2020-10-27
Payer: COMMERCIAL

## 2020-10-27 DIAGNOSIS — L97.509 TYPE 2 DIABETES MELLITUS WITH FOOT ULCER, UNSPECIFIED WHETHER LONG TERM INSULIN USE (HCC): ICD-10-CM

## 2020-10-27 DIAGNOSIS — E11.621 TYPE 2 DIABETES MELLITUS WITH FOOT ULCER, UNSPECIFIED WHETHER LONG TERM INSULIN USE (HCC): ICD-10-CM

## 2020-10-27 DIAGNOSIS — L97.418 NON-PRESSURE CHRONIC ULCER OF RIGHT HEEL AND MIDFOOT WITH OTHER SPECIFIED SEVERITY (HCC): ICD-10-CM

## 2020-10-27 LAB
GLUCOSE SERPL-MCNC: 146 MG/DL (ref 65–140)
GLUCOSE SERPL-MCNC: 264 MG/DL (ref 65–140)

## 2020-10-27 PROCEDURE — 99183 HYPERBARIC OXYGEN THERAPY: CPT | Performed by: PREVENTIVE MEDICINE

## 2020-10-27 PROCEDURE — 82948 REAGENT STRIP/BLOOD GLUCOSE: CPT | Performed by: PREVENTIVE MEDICINE

## 2020-10-27 PROCEDURE — G0277 HBOT, FULL BODY CHAMBER, 30M: HCPCS | Performed by: PREVENTIVE MEDICINE

## 2020-10-28 ENCOUNTER — OFFICE VISIT (OUTPATIENT)
Dept: WOUND CARE | Facility: HOSPITAL | Age: 55
End: 2020-10-28
Payer: COMMERCIAL

## 2020-10-28 VITALS
RESPIRATION RATE: 18 BRPM | DIASTOLIC BLOOD PRESSURE: 96 MMHG | TEMPERATURE: 97.4 F | SYSTOLIC BLOOD PRESSURE: 176 MMHG | HEART RATE: 76 BPM

## 2020-10-28 DIAGNOSIS — L97.418 NON-PRESSURE CHRONIC ULCER OF RIGHT HEEL AND MIDFOOT WITH OTHER SPECIFIED SEVERITY (HCC): ICD-10-CM

## 2020-10-28 DIAGNOSIS — E11.621 TYPE 2 DIABETES MELLITUS WITH FOOT ULCER, UNSPECIFIED WHETHER LONG TERM INSULIN USE (HCC): ICD-10-CM

## 2020-10-28 DIAGNOSIS — L97.509 TYPE 2 DIABETES MELLITUS WITH FOOT ULCER, UNSPECIFIED WHETHER LONG TERM INSULIN USE (HCC): ICD-10-CM

## 2020-10-28 LAB
GLUCOSE SERPL-MCNC: 121 MG/DL (ref 65–140)
GLUCOSE SERPL-MCNC: 293 MG/DL (ref 65–140)

## 2020-10-28 PROCEDURE — 99183 HYPERBARIC OXYGEN THERAPY: CPT | Performed by: FAMILY MEDICINE

## 2020-10-28 PROCEDURE — 82948 REAGENT STRIP/BLOOD GLUCOSE: CPT | Performed by: FAMILY MEDICINE

## 2020-10-28 PROCEDURE — G0277 HBOT, FULL BODY CHAMBER, 30M: HCPCS | Performed by: FAMILY MEDICINE

## 2020-10-29 ENCOUNTER — OFFICE VISIT (OUTPATIENT)
Dept: WOUND CARE | Facility: HOSPITAL | Age: 55
End: 2020-10-29
Payer: COMMERCIAL

## 2020-10-29 VITALS
TEMPERATURE: 97.9 F | HEART RATE: 71 BPM | SYSTOLIC BLOOD PRESSURE: 169 MMHG | DIASTOLIC BLOOD PRESSURE: 90 MMHG | RESPIRATION RATE: 16 BRPM

## 2020-10-29 DIAGNOSIS — E11.621 TYPE 2 DIABETES MELLITUS WITH FOOT ULCER, UNSPECIFIED WHETHER LONG TERM INSULIN USE (HCC): ICD-10-CM

## 2020-10-29 DIAGNOSIS — L97.509 TYPE 2 DIABETES MELLITUS WITH FOOT ULCER, UNSPECIFIED WHETHER LONG TERM INSULIN USE (HCC): ICD-10-CM

## 2020-10-29 DIAGNOSIS — L97.418 NON-PRESSURE CHRONIC ULCER OF RIGHT HEEL AND MIDFOOT WITH OTHER SPECIFIED SEVERITY (HCC): ICD-10-CM

## 2020-10-29 LAB
GLUCOSE SERPL-MCNC: 251 MG/DL (ref 65–140)
GLUCOSE SERPL-MCNC: 97 MG/DL (ref 65–140)

## 2020-10-29 PROCEDURE — 82948 REAGENT STRIP/BLOOD GLUCOSE: CPT | Performed by: PREVENTIVE MEDICINE

## 2020-10-29 PROCEDURE — G0277 HBOT, FULL BODY CHAMBER, 30M: HCPCS | Performed by: PREVENTIVE MEDICINE

## 2020-10-29 PROCEDURE — 99183 HYPERBARIC OXYGEN THERAPY: CPT | Performed by: PREVENTIVE MEDICINE

## 2020-10-30 ENCOUNTER — OFFICE VISIT (OUTPATIENT)
Dept: WOUND CARE | Facility: HOSPITAL | Age: 55
End: 2020-10-30
Payer: COMMERCIAL

## 2020-10-30 VITALS
TEMPERATURE: 97.9 F | HEART RATE: 69 BPM | RESPIRATION RATE: 18 BRPM | DIASTOLIC BLOOD PRESSURE: 73 MMHG | SYSTOLIC BLOOD PRESSURE: 176 MMHG

## 2020-10-30 DIAGNOSIS — L97.509 TYPE 2 DIABETES MELLITUS WITH FOOT ULCER, UNSPECIFIED WHETHER LONG TERM INSULIN USE (HCC): Primary | ICD-10-CM

## 2020-10-30 DIAGNOSIS — E11.621 TYPE 2 DIABETES MELLITUS WITH FOOT ULCER, UNSPECIFIED WHETHER LONG TERM INSULIN USE (HCC): Primary | ICD-10-CM

## 2020-10-30 DIAGNOSIS — L97.418 NON-PRESSURE CHRONIC ULCER OF RIGHT HEEL AND MIDFOOT WITH OTHER SPECIFIED SEVERITY (HCC): ICD-10-CM

## 2020-10-30 LAB
GLUCOSE SERPL-MCNC: 137 MG/DL (ref 65–140)
GLUCOSE SERPL-MCNC: 160 MG/DL (ref 65–140)

## 2020-10-30 PROCEDURE — 99183 HYPERBARIC OXYGEN THERAPY: CPT | Performed by: NURSE PRACTITIONER

## 2020-10-30 PROCEDURE — 82948 REAGENT STRIP/BLOOD GLUCOSE: CPT | Performed by: NURSE PRACTITIONER

## 2020-10-30 PROCEDURE — G0277 HBOT, FULL BODY CHAMBER, 30M: HCPCS | Performed by: NURSE PRACTITIONER

## 2020-11-02 ENCOUNTER — OFFICE VISIT (OUTPATIENT)
Dept: WOUND CARE | Facility: HOSPITAL | Age: 55
End: 2020-11-02
Payer: COMMERCIAL

## 2020-11-02 DIAGNOSIS — L97.509 TYPE 2 DIABETES MELLITUS WITH FOOT ULCER, UNSPECIFIED WHETHER LONG TERM INSULIN USE (HCC): Primary | ICD-10-CM

## 2020-11-02 DIAGNOSIS — E11.621 TYPE 2 DIABETES MELLITUS WITH FOOT ULCER, UNSPECIFIED WHETHER LONG TERM INSULIN USE (HCC): Primary | ICD-10-CM

## 2020-11-02 DIAGNOSIS — L97.418 NON-PRESSURE CHRONIC ULCER OF RIGHT HEEL AND MIDFOOT WITH OTHER SPECIFIED SEVERITY (HCC): ICD-10-CM

## 2020-11-02 LAB — GLUCOSE SERPL-MCNC: 254 MG/DL (ref 65–140)

## 2020-11-02 PROCEDURE — G0277 HBOT, FULL BODY CHAMBER, 30M: HCPCS | Performed by: NURSE PRACTITIONER

## 2020-11-02 PROCEDURE — 99183 HYPERBARIC OXYGEN THERAPY: CPT | Performed by: NURSE PRACTITIONER

## 2020-11-02 PROCEDURE — 82948 REAGENT STRIP/BLOOD GLUCOSE: CPT | Performed by: NURSE PRACTITIONER

## 2020-11-03 ENCOUNTER — OFFICE VISIT (OUTPATIENT)
Dept: WOUND CARE | Facility: HOSPITAL | Age: 55
End: 2020-11-03
Payer: COMMERCIAL

## 2020-11-03 VITALS
SYSTOLIC BLOOD PRESSURE: 137 MMHG | TEMPERATURE: 97.2 F | DIASTOLIC BLOOD PRESSURE: 83 MMHG | HEART RATE: 79 BPM | RESPIRATION RATE: 18 BRPM

## 2020-11-03 DIAGNOSIS — L97.418 NON-PRESSURE CHRONIC ULCER OF RIGHT HEEL AND MIDFOOT WITH OTHER SPECIFIED SEVERITY (HCC): ICD-10-CM

## 2020-11-03 DIAGNOSIS — E11.621 TYPE 2 DIABETES MELLITUS WITH FOOT ULCER, UNSPECIFIED WHETHER LONG TERM INSULIN USE (HCC): Primary | ICD-10-CM

## 2020-11-03 DIAGNOSIS — L97.509 TYPE 2 DIABETES MELLITUS WITH FOOT ULCER, UNSPECIFIED WHETHER LONG TERM INSULIN USE (HCC): Primary | ICD-10-CM

## 2020-11-03 LAB
GLUCOSE SERPL-MCNC: 172 MG/DL (ref 65–140)
GLUCOSE SERPL-MCNC: 225 MG/DL (ref 65–140)
GLUCOSE SERPL-MCNC: 309 MG/DL (ref 65–140)

## 2020-11-03 PROCEDURE — 82948 REAGENT STRIP/BLOOD GLUCOSE: CPT | Performed by: NURSE PRACTITIONER

## 2020-11-03 PROCEDURE — 99183 HYPERBARIC OXYGEN THERAPY: CPT | Performed by: NURSE PRACTITIONER

## 2020-11-03 PROCEDURE — G0277 HBOT, FULL BODY CHAMBER, 30M: HCPCS | Performed by: NURSE PRACTITIONER

## 2020-11-04 ENCOUNTER — OFFICE VISIT (OUTPATIENT)
Dept: WOUND CARE | Facility: HOSPITAL | Age: 55
End: 2020-11-04
Payer: COMMERCIAL

## 2020-11-04 VITALS — DIASTOLIC BLOOD PRESSURE: 89 MMHG | TEMPERATURE: 96.6 F | SYSTOLIC BLOOD PRESSURE: 159 MMHG | HEART RATE: 76 BPM

## 2020-11-04 DIAGNOSIS — L97.509 TYPE 2 DIABETES MELLITUS WITH FOOT ULCER, UNSPECIFIED WHETHER LONG TERM INSULIN USE (HCC): ICD-10-CM

## 2020-11-04 DIAGNOSIS — E11.621 TYPE 2 DIABETES MELLITUS WITH FOOT ULCER, UNSPECIFIED WHETHER LONG TERM INSULIN USE (HCC): ICD-10-CM

## 2020-11-04 DIAGNOSIS — L97.418 NON-PRESSURE CHRONIC ULCER OF RIGHT HEEL AND MIDFOOT WITH OTHER SPECIFIED SEVERITY (HCC): ICD-10-CM

## 2020-11-04 LAB
GLUCOSE SERPL-MCNC: 129 MG/DL (ref 65–140)
GLUCOSE SERPL-MCNC: 130 MG/DL (ref 65–140)
GLUCOSE SERPL-MCNC: 91 MG/DL (ref 65–140)

## 2020-11-04 PROCEDURE — 99183 HYPERBARIC OXYGEN THERAPY: CPT | Performed by: FAMILY MEDICINE

## 2020-11-04 PROCEDURE — G0277 HBOT, FULL BODY CHAMBER, 30M: HCPCS | Performed by: FAMILY MEDICINE

## 2020-11-04 PROCEDURE — 82948 REAGENT STRIP/BLOOD GLUCOSE: CPT | Performed by: FAMILY MEDICINE

## 2020-11-05 ENCOUNTER — OFFICE VISIT (OUTPATIENT)
Dept: WOUND CARE | Facility: HOSPITAL | Age: 55
End: 2020-11-05
Payer: COMMERCIAL

## 2020-11-05 VITALS
SYSTOLIC BLOOD PRESSURE: 171 MMHG | RESPIRATION RATE: 18 BRPM | TEMPERATURE: 97.2 F | DIASTOLIC BLOOD PRESSURE: 90 MMHG | HEART RATE: 75 BPM

## 2020-11-05 DIAGNOSIS — E11.621 TYPE 2 DIABETES MELLITUS WITH FOOT ULCER, UNSPECIFIED WHETHER LONG TERM INSULIN USE (HCC): Primary | ICD-10-CM

## 2020-11-05 DIAGNOSIS — L97.418 NON-PRESSURE CHRONIC ULCER OF RIGHT HEEL AND MIDFOOT WITH OTHER SPECIFIED SEVERITY (HCC): ICD-10-CM

## 2020-11-05 DIAGNOSIS — L97.509 TYPE 2 DIABETES MELLITUS WITH FOOT ULCER, UNSPECIFIED WHETHER LONG TERM INSULIN USE (HCC): Primary | ICD-10-CM

## 2020-11-05 LAB
GLUCOSE SERPL-MCNC: 240 MG/DL (ref 65–140)
GLUCOSE SERPL-MCNC: 335 MG/DL (ref 65–140)

## 2020-11-05 PROCEDURE — 99183 HYPERBARIC OXYGEN THERAPY: CPT | Performed by: PREVENTIVE MEDICINE

## 2020-11-05 PROCEDURE — 82948 REAGENT STRIP/BLOOD GLUCOSE: CPT | Performed by: PREVENTIVE MEDICINE

## 2020-11-05 PROCEDURE — G0277 HBOT, FULL BODY CHAMBER, 30M: HCPCS | Performed by: PREVENTIVE MEDICINE

## 2020-11-06 ENCOUNTER — OFFICE VISIT (OUTPATIENT)
Dept: WOUND CARE | Facility: HOSPITAL | Age: 55
End: 2020-11-06
Payer: COMMERCIAL

## 2020-11-06 VITALS
SYSTOLIC BLOOD PRESSURE: 162 MMHG | RESPIRATION RATE: 18 BRPM | DIASTOLIC BLOOD PRESSURE: 80 MMHG | TEMPERATURE: 97.4 F | HEART RATE: 76 BPM

## 2020-11-06 VITALS
DIASTOLIC BLOOD PRESSURE: 93 MMHG | RESPIRATION RATE: 18 BRPM | SYSTOLIC BLOOD PRESSURE: 160 MMHG | HEART RATE: 73 BPM | TEMPERATURE: 97 F

## 2020-11-06 DIAGNOSIS — L97.509 TYPE 2 DIABETES MELLITUS WITH FOOT ULCER, UNSPECIFIED WHETHER LONG TERM INSULIN USE (HCC): Primary | ICD-10-CM

## 2020-11-06 DIAGNOSIS — L97.412 ULCER OF RIGHT HEEL, WITH FAT LAYER EXPOSED (HCC): ICD-10-CM

## 2020-11-06 DIAGNOSIS — E11.42 DIABETIC POLYNEUROPATHY ASSOCIATED WITH TYPE 2 DIABETES MELLITUS (HCC): ICD-10-CM

## 2020-11-06 DIAGNOSIS — L89.892 PRESSURE ULCER OF BKA STUMP, STAGE 2 (HCC): ICD-10-CM

## 2020-11-06 DIAGNOSIS — E11.621 TYPE 2 DIABETES MELLITUS WITH FOOT ULCER, UNSPECIFIED WHETHER LONG TERM INSULIN USE (HCC): Primary | ICD-10-CM

## 2020-11-06 DIAGNOSIS — E11.621 TYPE 2 DIABETES MELLITUS WITH FOOT ULCER, UNSPECIFIED WHETHER LONG TERM INSULIN USE (HCC): ICD-10-CM

## 2020-11-06 DIAGNOSIS — L97.509 TYPE 2 DIABETES MELLITUS WITH FOOT ULCER, UNSPECIFIED WHETHER LONG TERM INSULIN USE (HCC): ICD-10-CM

## 2020-11-06 DIAGNOSIS — Z89.512 HX OF BKA, LEFT (HCC): Primary | ICD-10-CM

## 2020-11-06 DIAGNOSIS — L97.412 ULCER OF HEEL AND MIDFOOT, RIGHT, WITH FAT LAYER EXPOSED (HCC): ICD-10-CM

## 2020-11-06 DIAGNOSIS — T87.89 PRESSURE ULCER OF BKA STUMP, STAGE 2 (HCC): ICD-10-CM

## 2020-11-06 DIAGNOSIS — L97.418 NON-PRESSURE CHRONIC ULCER OF RIGHT HEEL AND MIDFOOT WITH OTHER SPECIFIED SEVERITY (HCC): ICD-10-CM

## 2020-11-06 LAB
GLUCOSE SERPL-MCNC: 179 MG/DL (ref 65–140)
GLUCOSE SERPL-MCNC: 296 MG/DL (ref 65–140)

## 2020-11-06 PROCEDURE — 82948 REAGENT STRIP/BLOOD GLUCOSE: CPT | Performed by: NURSE PRACTITIONER

## 2020-11-06 PROCEDURE — 99183 HYPERBARIC OXYGEN THERAPY: CPT | Performed by: NURSE PRACTITIONER

## 2020-11-06 PROCEDURE — G0277 HBOT, FULL BODY CHAMBER, 30M: HCPCS | Performed by: NURSE PRACTITIONER

## 2020-11-06 PROCEDURE — 99213 OFFICE O/P EST LOW 20 MIN: CPT | Performed by: PODIATRIST

## 2020-11-06 RX ORDER — LIDOCAINE HYDROCHLORIDE 40 MG/ML
5 SOLUTION TOPICAL ONCE
Status: COMPLETED | OUTPATIENT
Start: 2020-11-06 | End: 2020-11-06

## 2020-11-06 RX ADMIN — LIDOCAINE HYDROCHLORIDE 5 ML: 40 SOLUTION TOPICAL at 13:16

## 2020-11-09 ENCOUNTER — OFFICE VISIT (OUTPATIENT)
Dept: WOUND CARE | Facility: HOSPITAL | Age: 55
End: 2020-11-09
Payer: COMMERCIAL

## 2020-11-09 VITALS
SYSTOLIC BLOOD PRESSURE: 164 MMHG | RESPIRATION RATE: 18 BRPM | DIASTOLIC BLOOD PRESSURE: 84 MMHG | TEMPERATURE: 97.4 F | HEART RATE: 88 BPM

## 2020-11-09 DIAGNOSIS — E11.621 TYPE 2 DIABETES MELLITUS WITH FOOT ULCER, UNSPECIFIED WHETHER LONG TERM INSULIN USE (HCC): Primary | ICD-10-CM

## 2020-11-09 DIAGNOSIS — L97.509 TYPE 2 DIABETES MELLITUS WITH FOOT ULCER, UNSPECIFIED WHETHER LONG TERM INSULIN USE (HCC): Primary | ICD-10-CM

## 2020-11-09 DIAGNOSIS — L97.418 NON-PRESSURE CHRONIC ULCER OF RIGHT HEEL AND MIDFOOT WITH OTHER SPECIFIED SEVERITY (HCC): ICD-10-CM

## 2020-11-09 LAB
GLUCOSE SERPL-MCNC: 170 MG/DL (ref 65–140)
GLUCOSE SERPL-MCNC: 305 MG/DL (ref 65–140)

## 2020-11-09 PROCEDURE — 82948 REAGENT STRIP/BLOOD GLUCOSE: CPT | Performed by: NURSE PRACTITIONER

## 2020-11-09 PROCEDURE — 99183 HYPERBARIC OXYGEN THERAPY: CPT | Performed by: NURSE PRACTITIONER

## 2020-11-09 PROCEDURE — G0277 HBOT, FULL BODY CHAMBER, 30M: HCPCS | Performed by: NURSE PRACTITIONER

## 2020-11-10 ENCOUNTER — OFFICE VISIT (OUTPATIENT)
Dept: WOUND CARE | Facility: HOSPITAL | Age: 55
End: 2020-11-10
Payer: COMMERCIAL

## 2020-11-10 VITALS
SYSTOLIC BLOOD PRESSURE: 146 MMHG | HEART RATE: 84 BPM | RESPIRATION RATE: 18 BRPM | DIASTOLIC BLOOD PRESSURE: 84 MMHG | TEMPERATURE: 97.4 F

## 2020-11-10 DIAGNOSIS — E11.621 TYPE 2 DIABETES MELLITUS WITH FOOT ULCER, UNSPECIFIED WHETHER LONG TERM INSULIN USE (HCC): Primary | ICD-10-CM

## 2020-11-10 DIAGNOSIS — L97.509 TYPE 2 DIABETES MELLITUS WITH FOOT ULCER, UNSPECIFIED WHETHER LONG TERM INSULIN USE (HCC): Primary | ICD-10-CM

## 2020-11-10 DIAGNOSIS — L97.418 NON-PRESSURE CHRONIC ULCER OF RIGHT HEEL AND MIDFOOT WITH OTHER SPECIFIED SEVERITY (HCC): ICD-10-CM

## 2020-11-10 LAB
GLUCOSE SERPL-MCNC: 156 MG/DL (ref 65–140)
GLUCOSE SERPL-MCNC: 70 MG/DL (ref 65–140)

## 2020-11-10 PROCEDURE — 82948 REAGENT STRIP/BLOOD GLUCOSE: CPT | Performed by: NURSE PRACTITIONER

## 2020-11-10 PROCEDURE — G0277 HBOT, FULL BODY CHAMBER, 30M: HCPCS | Performed by: NURSE PRACTITIONER

## 2020-11-10 PROCEDURE — 99183 HYPERBARIC OXYGEN THERAPY: CPT | Performed by: NURSE PRACTITIONER

## 2020-11-11 ENCOUNTER — OFFICE VISIT (OUTPATIENT)
Dept: WOUND CARE | Facility: HOSPITAL | Age: 55
End: 2020-11-11
Payer: COMMERCIAL

## 2020-11-11 VITALS
SYSTOLIC BLOOD PRESSURE: 150 MMHG | DIASTOLIC BLOOD PRESSURE: 78 MMHG | HEART RATE: 80 BPM | TEMPERATURE: 97.2 F | RESPIRATION RATE: 18 BRPM

## 2020-11-11 DIAGNOSIS — L97.509 TYPE 2 DIABETES MELLITUS WITH FOOT ULCER, UNSPECIFIED WHETHER LONG TERM INSULIN USE (HCC): ICD-10-CM

## 2020-11-11 DIAGNOSIS — L97.418 NON-PRESSURE CHRONIC ULCER OF RIGHT HEEL AND MIDFOOT WITH OTHER SPECIFIED SEVERITY (HCC): ICD-10-CM

## 2020-11-11 DIAGNOSIS — E11.621 TYPE 2 DIABETES MELLITUS WITH FOOT ULCER, UNSPECIFIED WHETHER LONG TERM INSULIN USE (HCC): ICD-10-CM

## 2020-11-11 LAB
GLUCOSE SERPL-MCNC: 171 MG/DL (ref 65–140)
GLUCOSE SERPL-MCNC: 247 MG/DL (ref 65–140)

## 2020-11-11 PROCEDURE — 82948 REAGENT STRIP/BLOOD GLUCOSE: CPT | Performed by: FAMILY MEDICINE

## 2020-11-11 PROCEDURE — G0277 HBOT, FULL BODY CHAMBER, 30M: HCPCS | Performed by: FAMILY MEDICINE

## 2020-11-11 PROCEDURE — 99183 HYPERBARIC OXYGEN THERAPY: CPT | Performed by: FAMILY MEDICINE

## 2020-11-12 ENCOUNTER — OFFICE VISIT (OUTPATIENT)
Dept: WOUND CARE | Facility: HOSPITAL | Age: 55
End: 2020-11-12
Payer: COMMERCIAL

## 2020-11-12 VITALS
DIASTOLIC BLOOD PRESSURE: 86 MMHG | SYSTOLIC BLOOD PRESSURE: 162 MMHG | TEMPERATURE: 97.7 F | RESPIRATION RATE: 18 BRPM | HEART RATE: 78 BPM

## 2020-11-12 DIAGNOSIS — L97.418 NON-PRESSURE CHRONIC ULCER OF RIGHT HEEL AND MIDFOOT WITH OTHER SPECIFIED SEVERITY (HCC): ICD-10-CM

## 2020-11-12 DIAGNOSIS — E11.621 TYPE 2 DIABETES MELLITUS WITH FOOT ULCER, UNSPECIFIED WHETHER LONG TERM INSULIN USE (HCC): Primary | ICD-10-CM

## 2020-11-12 DIAGNOSIS — L97.509 TYPE 2 DIABETES MELLITUS WITH FOOT ULCER, UNSPECIFIED WHETHER LONG TERM INSULIN USE (HCC): Primary | ICD-10-CM

## 2020-11-12 LAB
GLUCOSE SERPL-MCNC: 119 MG/DL (ref 65–140)
GLUCOSE SERPL-MCNC: 246 MG/DL (ref 65–140)

## 2020-11-12 PROCEDURE — 82948 REAGENT STRIP/BLOOD GLUCOSE: CPT | Performed by: PREVENTIVE MEDICINE

## 2020-11-12 PROCEDURE — G0277 HBOT, FULL BODY CHAMBER, 30M: HCPCS | Performed by: PREVENTIVE MEDICINE

## 2020-11-12 PROCEDURE — 99183 HYPERBARIC OXYGEN THERAPY: CPT | Performed by: PREVENTIVE MEDICINE

## 2020-11-13 ENCOUNTER — OFFICE VISIT (OUTPATIENT)
Dept: WOUND CARE | Facility: HOSPITAL | Age: 55
End: 2020-11-13
Payer: COMMERCIAL

## 2020-11-13 VITALS
RESPIRATION RATE: 18 BRPM | TEMPERATURE: 96.7 F | SYSTOLIC BLOOD PRESSURE: 160 MMHG | DIASTOLIC BLOOD PRESSURE: 90 MMHG | HEART RATE: 78 BPM

## 2020-11-13 DIAGNOSIS — L97.509 TYPE 2 DIABETES MELLITUS WITH FOOT ULCER, UNSPECIFIED WHETHER LONG TERM INSULIN USE (HCC): Primary | ICD-10-CM

## 2020-11-13 DIAGNOSIS — E11.621 TYPE 2 DIABETES MELLITUS WITH FOOT ULCER, UNSPECIFIED WHETHER LONG TERM INSULIN USE (HCC): Primary | ICD-10-CM

## 2020-11-13 DIAGNOSIS — L97.418 NON-PRESSURE CHRONIC ULCER OF RIGHT HEEL AND MIDFOOT WITH OTHER SPECIFIED SEVERITY (HCC): ICD-10-CM

## 2020-11-13 LAB
GLUCOSE SERPL-MCNC: 219 MG/DL (ref 65–140)
GLUCOSE SERPL-MCNC: 332 MG/DL (ref 65–140)

## 2020-11-13 PROCEDURE — 82948 REAGENT STRIP/BLOOD GLUCOSE: CPT | Performed by: NURSE PRACTITIONER

## 2020-11-13 PROCEDURE — 99183 HYPERBARIC OXYGEN THERAPY: CPT | Performed by: NURSE PRACTITIONER

## 2020-11-13 PROCEDURE — G0277 HBOT, FULL BODY CHAMBER, 30M: HCPCS | Performed by: NURSE PRACTITIONER

## 2020-11-16 ENCOUNTER — OFFICE VISIT (OUTPATIENT)
Dept: WOUND CARE | Facility: HOSPITAL | Age: 55
End: 2020-11-16
Payer: COMMERCIAL

## 2020-11-16 VITALS
SYSTOLIC BLOOD PRESSURE: 142 MMHG | HEART RATE: 76 BPM | RESPIRATION RATE: 18 BRPM | DIASTOLIC BLOOD PRESSURE: 80 MMHG | TEMPERATURE: 97.4 F

## 2020-11-16 DIAGNOSIS — L97.509 TYPE 2 DIABETES MELLITUS WITH FOOT ULCER, UNSPECIFIED WHETHER LONG TERM INSULIN USE (HCC): Primary | ICD-10-CM

## 2020-11-16 DIAGNOSIS — L97.418 NON-PRESSURE CHRONIC ULCER OF RIGHT HEEL AND MIDFOOT WITH OTHER SPECIFIED SEVERITY (HCC): ICD-10-CM

## 2020-11-16 DIAGNOSIS — E11.621 TYPE 2 DIABETES MELLITUS WITH FOOT ULCER, UNSPECIFIED WHETHER LONG TERM INSULIN USE (HCC): Primary | ICD-10-CM

## 2020-11-16 LAB
GLUCOSE SERPL-MCNC: 136 MG/DL (ref 65–140)
GLUCOSE SERPL-MCNC: 62 MG/DL (ref 65–140)

## 2020-11-16 PROCEDURE — 99183 HYPERBARIC OXYGEN THERAPY: CPT | Performed by: NURSE PRACTITIONER

## 2020-11-16 PROCEDURE — 82948 REAGENT STRIP/BLOOD GLUCOSE: CPT | Performed by: NURSE PRACTITIONER

## 2020-11-16 PROCEDURE — G0277 HBOT, FULL BODY CHAMBER, 30M: HCPCS | Performed by: NURSE PRACTITIONER

## 2020-11-17 ENCOUNTER — OFFICE VISIT (OUTPATIENT)
Dept: WOUND CARE | Facility: HOSPITAL | Age: 55
End: 2020-11-17
Payer: COMMERCIAL

## 2020-11-17 VITALS — DIASTOLIC BLOOD PRESSURE: 82 MMHG | SYSTOLIC BLOOD PRESSURE: 158 MMHG | TEMPERATURE: 98.3 F | HEART RATE: 97 BPM

## 2020-11-17 DIAGNOSIS — L97.509 TYPE 2 DIABETES MELLITUS WITH FOOT ULCER, UNSPECIFIED WHETHER LONG TERM INSULIN USE (HCC): Primary | ICD-10-CM

## 2020-11-17 DIAGNOSIS — E11.621 TYPE 2 DIABETES MELLITUS WITH FOOT ULCER, UNSPECIFIED WHETHER LONG TERM INSULIN USE (HCC): Primary | ICD-10-CM

## 2020-11-17 DIAGNOSIS — L97.418 NON-PRESSURE CHRONIC ULCER OF RIGHT HEEL AND MIDFOOT WITH OTHER SPECIFIED SEVERITY (HCC): ICD-10-CM

## 2020-11-17 LAB
GLUCOSE SERPL-MCNC: 246 MG/DL (ref 65–140)
GLUCOSE SERPL-MCNC: 280 MG/DL (ref 65–140)

## 2020-11-17 PROCEDURE — G0277 HBOT, FULL BODY CHAMBER, 30M: HCPCS | Performed by: NURSE PRACTITIONER

## 2020-11-17 PROCEDURE — 99183 HYPERBARIC OXYGEN THERAPY: CPT | Performed by: NURSE PRACTITIONER

## 2020-11-17 PROCEDURE — 82948 REAGENT STRIP/BLOOD GLUCOSE: CPT | Performed by: NURSE PRACTITIONER

## 2020-11-18 ENCOUNTER — OFFICE VISIT (OUTPATIENT)
Dept: WOUND CARE | Facility: HOSPITAL | Age: 55
End: 2020-11-18
Payer: COMMERCIAL

## 2020-11-18 VITALS
HEART RATE: 78 BPM | TEMPERATURE: 97.6 F | DIASTOLIC BLOOD PRESSURE: 74 MMHG | RESPIRATION RATE: 18 BRPM | SYSTOLIC BLOOD PRESSURE: 144 MMHG

## 2020-11-18 DIAGNOSIS — L97.509 TYPE 2 DIABETES MELLITUS WITH FOOT ULCER, UNSPECIFIED WHETHER LONG TERM INSULIN USE (HCC): ICD-10-CM

## 2020-11-18 DIAGNOSIS — E11.621 TYPE 2 DIABETES MELLITUS WITH FOOT ULCER, UNSPECIFIED WHETHER LONG TERM INSULIN USE (HCC): ICD-10-CM

## 2020-11-18 DIAGNOSIS — L97.418 NON-PRESSURE CHRONIC ULCER OF RIGHT HEEL AND MIDFOOT WITH OTHER SPECIFIED SEVERITY (HCC): ICD-10-CM

## 2020-11-18 LAB
GLUCOSE SERPL-MCNC: 117 MG/DL (ref 65–140)
GLUCOSE SERPL-MCNC: 215 MG/DL (ref 65–140)

## 2020-11-18 PROCEDURE — 82948 REAGENT STRIP/BLOOD GLUCOSE: CPT | Performed by: FAMILY MEDICINE

## 2020-11-18 PROCEDURE — 99183 HYPERBARIC OXYGEN THERAPY: CPT | Performed by: FAMILY MEDICINE

## 2020-11-18 PROCEDURE — G0277 HBOT, FULL BODY CHAMBER, 30M: HCPCS | Performed by: FAMILY MEDICINE

## 2020-11-19 ENCOUNTER — OFFICE VISIT (OUTPATIENT)
Dept: WOUND CARE | Facility: HOSPITAL | Age: 55
End: 2020-11-19
Payer: COMMERCIAL

## 2020-11-19 VITALS
DIASTOLIC BLOOD PRESSURE: 78 MMHG | RESPIRATION RATE: 18 BRPM | SYSTOLIC BLOOD PRESSURE: 154 MMHG | HEART RATE: 76 BPM | TEMPERATURE: 97.7 F

## 2020-11-19 DIAGNOSIS — L97.418 NON-PRESSURE CHRONIC ULCER OF RIGHT HEEL AND MIDFOOT WITH OTHER SPECIFIED SEVERITY (HCC): ICD-10-CM

## 2020-11-19 DIAGNOSIS — E11.621 TYPE 2 DIABETES MELLITUS WITH FOOT ULCER, UNSPECIFIED WHETHER LONG TERM INSULIN USE (HCC): Primary | ICD-10-CM

## 2020-11-19 DIAGNOSIS — L97.509 TYPE 2 DIABETES MELLITUS WITH FOOT ULCER, UNSPECIFIED WHETHER LONG TERM INSULIN USE (HCC): Primary | ICD-10-CM

## 2020-11-19 LAB
GLUCOSE SERPL-MCNC: 207 MG/DL (ref 65–140)
GLUCOSE SERPL-MCNC: 280 MG/DL (ref 65–140)

## 2020-11-19 PROCEDURE — 82948 REAGENT STRIP/BLOOD GLUCOSE: CPT | Performed by: PREVENTIVE MEDICINE

## 2020-11-19 PROCEDURE — G0277 HBOT, FULL BODY CHAMBER, 30M: HCPCS | Performed by: PREVENTIVE MEDICINE

## 2020-11-19 PROCEDURE — 99183 HYPERBARIC OXYGEN THERAPY: CPT | Performed by: PREVENTIVE MEDICINE

## 2020-11-20 ENCOUNTER — APPOINTMENT (OUTPATIENT)
Dept: WOUND CARE | Facility: HOSPITAL | Age: 55
End: 2020-11-20
Payer: COMMERCIAL

## 2020-11-20 ENCOUNTER — OFFICE VISIT (OUTPATIENT)
Dept: WOUND CARE | Facility: HOSPITAL | Age: 55
End: 2020-11-20
Payer: COMMERCIAL

## 2020-11-20 VITALS
TEMPERATURE: 97.5 F | DIASTOLIC BLOOD PRESSURE: 80 MMHG | SYSTOLIC BLOOD PRESSURE: 154 MMHG | RESPIRATION RATE: 18 BRPM | HEART RATE: 76 BPM

## 2020-11-20 VITALS
TEMPERATURE: 97.5 F | RESPIRATION RATE: 20 BRPM | HEART RATE: 82 BPM | SYSTOLIC BLOOD PRESSURE: 132 MMHG | DIASTOLIC BLOOD PRESSURE: 80 MMHG

## 2020-11-20 DIAGNOSIS — L97.509 TYPE 2 DIABETES MELLITUS WITH FOOT ULCER, UNSPECIFIED WHETHER LONG TERM INSULIN USE (HCC): ICD-10-CM

## 2020-11-20 DIAGNOSIS — E11.621 TYPE 2 DIABETES MELLITUS WITH FOOT ULCER, UNSPECIFIED WHETHER LONG TERM INSULIN USE (HCC): ICD-10-CM

## 2020-11-20 DIAGNOSIS — E11.42 DIABETIC POLYNEUROPATHY ASSOCIATED WITH TYPE 2 DIABETES MELLITUS (HCC): ICD-10-CM

## 2020-11-20 DIAGNOSIS — Z89.512 HX OF BKA, LEFT (HCC): ICD-10-CM

## 2020-11-20 DIAGNOSIS — L97.412 ULCER OF HEEL AND MIDFOOT, RIGHT, WITH FAT LAYER EXPOSED (HCC): ICD-10-CM

## 2020-11-20 DIAGNOSIS — L97.412 ULCER OF RIGHT HEEL, WITH FAT LAYER EXPOSED (HCC): Primary | ICD-10-CM

## 2020-11-20 DIAGNOSIS — T87.89 PRESSURE ULCER OF BKA STUMP, STAGE 2 (HCC): ICD-10-CM

## 2020-11-20 DIAGNOSIS — L97.418 NON-PRESSURE CHRONIC ULCER OF RIGHT HEEL AND MIDFOOT WITH OTHER SPECIFIED SEVERITY (HCC): ICD-10-CM

## 2020-11-20 DIAGNOSIS — L89.892 PRESSURE ULCER OF BKA STUMP, STAGE 2 (HCC): ICD-10-CM

## 2020-11-20 LAB
GLUCOSE SERPL-MCNC: 143 MG/DL (ref 65–140)
GLUCOSE SERPL-MCNC: 233 MG/DL (ref 65–140)

## 2020-11-20 PROCEDURE — 99183 HYPERBARIC OXYGEN THERAPY: CPT | Performed by: NURSE PRACTITIONER

## 2020-11-20 PROCEDURE — 82948 REAGENT STRIP/BLOOD GLUCOSE: CPT | Performed by: NURSE PRACTITIONER

## 2020-11-20 PROCEDURE — 11042 DBRDMT SUBQ TIS 1ST 20SQCM/<: CPT | Performed by: PODIATRIST

## 2020-11-20 PROCEDURE — G0277 HBOT, FULL BODY CHAMBER, 30M: HCPCS | Performed by: NURSE PRACTITIONER

## 2020-11-20 RX ORDER — LIDOCAINE HYDROCHLORIDE 40 MG/ML
5 SOLUTION TOPICAL ONCE
Status: COMPLETED | OUTPATIENT
Start: 2020-11-20 | End: 2020-11-20

## 2020-11-20 RX ADMIN — LIDOCAINE HYDROCHLORIDE 5 ML: 40 SOLUTION TOPICAL at 13:02

## 2020-11-22 PROBLEM — L97.412 ULCER OF RIGHT HEEL, WITH FAT LAYER EXPOSED (HCC): Status: ACTIVE | Noted: 2020-11-22

## 2020-12-04 ENCOUNTER — OFFICE VISIT (OUTPATIENT)
Dept: WOUND CARE | Facility: HOSPITAL | Age: 55
End: 2020-12-04
Payer: COMMERCIAL

## 2020-12-04 VITALS
TEMPERATURE: 97.2 F | DIASTOLIC BLOOD PRESSURE: 90 MMHG | RESPIRATION RATE: 18 BRPM | SYSTOLIC BLOOD PRESSURE: 173 MMHG | HEART RATE: 82 BPM

## 2020-12-04 DIAGNOSIS — L97.509 TYPE 2 DIABETES MELLITUS WITH FOOT ULCER, UNSPECIFIED WHETHER LONG TERM INSULIN USE (HCC): ICD-10-CM

## 2020-12-04 DIAGNOSIS — E11.42 DIABETIC POLYNEUROPATHY ASSOCIATED WITH TYPE 2 DIABETES MELLITUS (HCC): ICD-10-CM

## 2020-12-04 DIAGNOSIS — L97.412 ULCER OF RIGHT HEEL, WITH FAT LAYER EXPOSED (HCC): Primary | ICD-10-CM

## 2020-12-04 DIAGNOSIS — E11.621 TYPE 2 DIABETES MELLITUS WITH FOOT ULCER, UNSPECIFIED WHETHER LONG TERM INSULIN USE (HCC): ICD-10-CM

## 2020-12-04 DIAGNOSIS — L89.892 PRESSURE ULCER OF BKA STUMP, STAGE 2 (HCC): ICD-10-CM

## 2020-12-04 DIAGNOSIS — T87.89 PRESSURE ULCER OF BKA STUMP, STAGE 2 (HCC): ICD-10-CM

## 2020-12-04 PROCEDURE — 11042 DBRDMT SUBQ TIS 1ST 20SQCM/<: CPT | Performed by: PODIATRIST

## 2020-12-04 RX ORDER — LIDOCAINE HYDROCHLORIDE 40 MG/ML
5 SOLUTION TOPICAL ONCE
Status: COMPLETED | OUTPATIENT
Start: 2020-12-04 | End: 2020-12-04

## 2020-12-04 RX ADMIN — LIDOCAINE HYDROCHLORIDE 5 ML: 40 SOLUTION TOPICAL at 12:52

## 2021-01-01 DIAGNOSIS — E11.51 DIABETES MELLITUS WITH PERIPHERAL CIRCULATORY DISORDER, CONTROLLED (HCC): ICD-10-CM

## 2021-01-04 RX ORDER — PEN NEEDLE, DIABETIC 31 GX5/16"
NEEDLE, DISPOSABLE MISCELLANEOUS
Qty: 50 EACH | Refills: 0 | Status: SHIPPED | OUTPATIENT
Start: 2021-01-04

## 2021-03-13 DIAGNOSIS — I10 BENIGN ESSENTIAL HYPERTENSION: ICD-10-CM

## 2021-03-15 DIAGNOSIS — I10 BENIGN ESSENTIAL HYPERTENSION: ICD-10-CM

## 2021-03-15 DIAGNOSIS — E78.5 HYPERLIPIDEMIA, UNSPECIFIED HYPERLIPIDEMIA TYPE: ICD-10-CM

## 2021-03-18 RX ORDER — AMLODIPINE BESYLATE 2.5 MG/1
TABLET ORAL
Qty: 90 TABLET | Refills: 0 | Status: SHIPPED | OUTPATIENT
Start: 2021-03-18 | End: 2021-06-14

## 2021-03-18 RX ORDER — ROSUVASTATIN CALCIUM 20 MG/1
TABLET, COATED ORAL
Qty: 90 TABLET | Refills: 0 | Status: SHIPPED | OUTPATIENT
Start: 2021-03-18 | End: 2021-06-14

## 2021-03-18 NOTE — TELEPHONE ENCOUNTER
Vernon Romero made an apt in April with Dr Burnett   He needs these medications today    Can we please send these into pharmacy     Thank you

## 2021-03-21 RX ORDER — LISINOPRIL 40 MG/1
TABLET ORAL
Qty: 10 TABLET | Refills: 0 | Status: SHIPPED | OUTPATIENT
Start: 2021-03-21 | End: 2021-06-07

## 2021-03-30 DIAGNOSIS — E11.51 DIABETES MELLITUS WITH PERIPHERAL CIRCULATORY DISORDER, CONTROLLED (HCC): ICD-10-CM

## 2021-03-31 NOTE — TELEPHONE ENCOUNTER
Please call patient  1- he needs appointment  2- we have gotten a lot of push back regarding insurance wanting to change to basaglar in the future  Please make sure what he wants us to do  He may have to contact insurance since they want him to change

## 2021-04-01 RX ORDER — INSULIN GLARGINE 100 [IU]/ML
INJECTION, SOLUTION SUBCUTANEOUS
Qty: 15 ML | Refills: 0 | Status: SHIPPED | OUTPATIENT
Start: 2021-04-01 | End: 2021-04-15 | Stop reason: SDUPTHER

## 2021-04-02 ENCOUNTER — TELEPHONE (OUTPATIENT)
Dept: FAMILY MEDICINE CLINIC | Facility: CLINIC | Age: 56
End: 2021-04-02

## 2021-04-02 NOTE — TELEPHONE ENCOUNTER
Pt insisting Tena Shahidar must be approved  Cover My Chey Minor ID [id number]  [facility] Called [phone]   to verify Pt's insurance   Lavera Pilon are apparently the medications preferred   Forms completed Waiting for a reply   Asha Tom

## 2021-04-02 NOTE — TELEPHONE ENCOUNTER
Pt aware Lantus was approved    No expiration date given  Pt and pharmacy are aware  Task Complete  Hannah Buchanan

## 2021-04-06 ENCOUNTER — OFFICE VISIT (OUTPATIENT)
Dept: FAMILY MEDICINE CLINIC | Facility: CLINIC | Age: 56
End: 2021-04-06
Payer: COMMERCIAL

## 2021-04-06 ENCOUNTER — APPOINTMENT (OUTPATIENT)
Dept: RADIOLOGY | Facility: CLINIC | Age: 56
End: 2021-04-06
Payer: COMMERCIAL

## 2021-04-06 VITALS
OXYGEN SATURATION: 98 % | WEIGHT: 186 LBS | RESPIRATION RATE: 16 BRPM | HEIGHT: 67 IN | BODY MASS INDEX: 29.19 KG/M2 | TEMPERATURE: 98.1 F | DIASTOLIC BLOOD PRESSURE: 70 MMHG | SYSTOLIC BLOOD PRESSURE: 138 MMHG | HEART RATE: 73 BPM

## 2021-04-06 DIAGNOSIS — L97.929 NON-PRESSURE ULCER OF STUMP OF BELOW KNEE AMPUTATION OF LEFT LOWER EXTREMITY (HCC): ICD-10-CM

## 2021-04-06 DIAGNOSIS — T87.89 NON-PRESSURE ULCER OF STUMP OF BELOW KNEE AMPUTATION OF LEFT LOWER EXTREMITY (HCC): ICD-10-CM

## 2021-04-06 DIAGNOSIS — S88.119A: ICD-10-CM

## 2021-04-06 DIAGNOSIS — S88.119A: Primary | ICD-10-CM

## 2021-04-06 PROCEDURE — 3725F SCREEN DEPRESSION PERFORMED: CPT | Performed by: FAMILY MEDICINE

## 2021-04-06 PROCEDURE — 73590 X-RAY EXAM OF LOWER LEG: CPT

## 2021-04-06 PROCEDURE — 99214 OFFICE O/P EST MOD 30 MIN: CPT | Performed by: FAMILY MEDICINE

## 2021-04-06 RX ORDER — CLINDAMYCIN HYDROCHLORIDE 300 MG/1
300 CAPSULE ORAL 3 TIMES DAILY
Qty: 30 CAPSULE | Refills: 0 | Status: SHIPPED | OUTPATIENT
Start: 2021-04-06 | End: 2021-04-16

## 2021-04-06 NOTE — PROGRESS NOTES
Assessment/Plan:    1  Unilateral complete below-knee amputation, initial encounter ([facility] )  -     clindamycin (CLEOCIN) 300 MG capsule; Take 1 capsule (300 mg total) by mouth 3 (three) times a day for 10 days  -     Ambulatory referral to Wound Care; Future  -     XR tibia fibula 2 vw left; Future; Expected date: 04/06/2021    2  Non-pressure ulcer of stump of below knee amputation of left lower extremity (HCC)  -     clindamycin (CLEOCIN) 300 MG capsule; Take 1 capsule (300 mg total) by mouth 3 (three) times a day for 10 days  -     Ambulatory referral to Wound Care; Future  -     XR tibia fibula 2 vw left; Future; Expected date: 04/06/2021    This ulcer could be pressure however that is not really where he has pain  He has pain on the bottom -   I will get an x ray  Pt advised not to use the prosthesis on the BKA until it is adjusted  Pt sent to wound center will follow        There are no Patient Instructions on file for this visit  No follow-ups on file  Subjective:      Patient ID: Amanda Tavarez is a 60 y o  male  Chief Complaint   Patient presents with   Arvind Reamer wound     left mz cma       Pt has pain on his left stump  Pt had a BKA approx 2 year ago  Pt states pain started 2 weeks ago and what looks like an ulcer on stump  Ulcer does not actually hurt it hurts underneith      The following portions of the patient's history were reviewed and updated as appropriate: allergies, current medications, past family history, past medical history, past social history, past surgical history and problem list     Review of Systems   Constitutional: Negative for activity change, appetite change, chills, diaphoresis, fatigue, fever and unexpected weight change  HENT: Negative for congestion, dental problem, ear pain, mouth sores, sinus pressure, sinus pain, sore throat and trouble swallowing  Eyes: Negative for photophobia, discharge and itching     Respiratory: Negative for apnea, chest tightness and shortness of breath  Cardiovascular: Negative for chest pain, palpitations and leg swelling  Gastrointestinal: Negative for abdominal distention, abdominal pain, blood in stool, nausea and vomiting  Endocrine: Negative for cold intolerance, heat intolerance, polydipsia, polyphagia and polyuria  Genitourinary: Negative for difficulty urinating  Musculoskeletal: Negative for arthralgias  Skin: Positive for wound  Negative for color change  Neurological: Negative for dizziness, syncope, speech difficulty and headaches  Hematological: Negative for adenopathy  Psychiatric/Behavioral: Negative for agitation and behavioral problems           Current Outpatient Medications   Medication Sig Dispense Refill    amLODIPine (NORVASC) 2 5 mg tablet TAKE 1 TABLET BY MOUTH EVERY DAY 90 tablet 0    B-D UF III MINI PEN NEEDLES 31G X 5 MM MISC USE AS DIRECTED 50 each 0    CHUYITA MICROLET LANCETS lancets by Does not apply route      collagenase (SANTYL) ointment Apply topically daily 90 g 0    collagenase (SANTYL) ointment Apply topically daily 15 g 0    glucose blood ([employer] CONTOUR TEST) test strip by In Vitro route      Insulin Pen Needle 31G X 5 MM MISC BD Ultra-Fine Mini Pen Needle 31 gauge x 3/16"      Lantus SoloStar 100 units/mL injection pen INJECT 68 UNITS UNDER THE SKIN DAILY 15 mL 0    lisinopril (ZESTRIL) 40 mg tablet TAKE 1 TABLET BY MOUTH EVERY DAY 10 tablet 0    oxymetazoline (AFRIN) 0 05 % nasal spray 2 sprays by Each Nare route daily administer prior to HBO treatments      rosuvastatin (CRESTOR) 20 MG tablet TAKE 1 TABLET BY MOUTH EVERY DAY 90 tablet 0    sildenafil (VIAGRA) 100 mg tablet Take by mouth      cadexomer iodine (IODOSORB) 0 9 % gel Apply 1 application topically daily as needed for wound care (Patient not taking: Reported on 4/6/2021) 40 g 0    clindamycin (CLEOCIN) 300 MG capsule Take 1 capsule (300 mg total) by mouth 3 (three) times a day for 10 days 30 capsule 0     No current facility-administered medications for this visit  Objective:    /70   Pulse 73   Temp 98 1 °F (36 7 °C)   Resp 16   Ht 5' 7" (1 702 m)   Wt 84 4 kg (186 lb)   SpO2 98%   BMI 29 13 kg/m²        Physical Exam  Vitals signs and nursing note reviewed  Constitutional:       General: He is not in acute distress  Appearance: He is well-developed  He is not diaphoretic  HENT:      Head: Normocephalic and atraumatic  Right Ear: External ear normal       Left Ear: External ear normal       Nose: Nose normal       Mouth/Throat:      Pharynx: No oropharyngeal exudate  Eyes:      General: No scleral icterus  Right eye: No discharge  Left eye: No discharge  Pupils: Pupils are equal, round, and reactive to light  Neck:      Thyroid: No thyromegaly  Cardiovascular:      Rate and Rhythm: Normal rate  Heart sounds: Normal heart sounds  No murmur  Pulmonary:      Effort: Pulmonary effort is normal  No respiratory distress  Breath sounds: Normal breath sounds  No wheezing  Abdominal:      General: Bowel sounds are normal  There is no distension  Palpations: Abdomen is soft  There is no mass  Tenderness: There is no abdominal tenderness  There is no guarding or rebound  Musculoskeletal: Normal range of motion  Skin:     General: Skin is warm and dry  Findings: No erythema or rash  Comments: ulcer at base of stump  Pain post aspect of stump, no ulcer per say at that spot  Stump is warms and tender however     Neurological:      Mental Status: He is alert        Coordination: Coordination normal       Deep Tendon Reflexes: Reflexes normal    Psychiatric:         Behavior: Behavior normal                 Durwood Cast, DO

## 2021-04-07 ENCOUNTER — OFFICE VISIT (OUTPATIENT)
Dept: WOUND CARE | Facility: HOSPITAL | Age: 56
End: 2021-04-07
Payer: COMMERCIAL

## 2021-04-07 VITALS
WEIGHT: 181 LBS | HEIGHT: 67 IN | BODY MASS INDEX: 28.41 KG/M2 | DIASTOLIC BLOOD PRESSURE: 96 MMHG | TEMPERATURE: 97.6 F | HEART RATE: 75 BPM | SYSTOLIC BLOOD PRESSURE: 170 MMHG

## 2021-04-07 DIAGNOSIS — E11.622 DIABETIC ULCER OF LEFT LOWER LEG (HCC): Primary | ICD-10-CM

## 2021-04-07 DIAGNOSIS — S88.119S: ICD-10-CM

## 2021-04-07 DIAGNOSIS — L97.929 DIABETIC ULCER OF LEFT LOWER LEG (HCC): Primary | ICD-10-CM

## 2021-04-07 DIAGNOSIS — E11.51 DIABETES MELLITUS WITH PERIPHERAL CIRCULATORY DISORDER, CONTROLLED (HCC): ICD-10-CM

## 2021-04-07 PROCEDURE — 97597 DBRDMT OPN WND 1ST 20 CM/<: CPT | Performed by: FAMILY MEDICINE

## 2021-04-07 PROCEDURE — 99213 OFFICE O/P EST LOW 20 MIN: CPT | Performed by: FAMILY MEDICINE

## 2021-04-07 PROCEDURE — G0463 HOSPITAL OUTPT CLINIC VISIT: HCPCS | Performed by: FAMILY MEDICINE

## 2021-04-07 RX ORDER — LIDOCAINE HYDROCHLORIDE 40 MG/ML
5 SOLUTION TOPICAL ONCE
Status: COMPLETED | OUTPATIENT
Start: 2021-04-07 | End: 2021-04-07

## 2021-04-07 RX ADMIN — LIDOCAINE HYDROCHLORIDE 5 ML: 40 SOLUTION TOPICAL at 08:40

## 2021-04-07 NOTE — PROGRESS NOTES
Patient ID: Kristyn Ly is a 60 y o  male Date of Birth 1965     Chief Complaint  Chief Complaint   Patient presents with    New Patient Visit     LLE wound       Allergies  Bee venom and Penicillins    Assessment:    No problem-specific Assessment & Plan notes found for this encounter  Diagnoses and all orders for this visit:    Diabetic ulcer of left lower leg (HCC)  -     lidocaine (XYLOCAINE) 4 % topical solution 5 mL  -     Wound cleansing and dressings; Future  -     Wound off loading; Future  -     Debridement    Diabetes mellitus with peripheral circulatory disorder, controlled ([facility])    Unilateral complete below-knee amputation, sequela ([facility] )              Debridement   Wound 04/07/21 Diabetic Ulcer Leg Left; Lower    Universal Protocol:  Consent: Verbal consent obtained  Risks and benefits: risks, benefits and alternatives were discussed  Consent given by: patient  Time out: Immediately prior to procedure a "time out" was called to verify the correct patient, procedure, equipment, support staff and site/side marked as required  Timeout called at: 4/7/2021 9:09 AM   Patient understanding: patient states understanding of the procedure being performed  Patient consent: the patient's understanding of the procedure matches consent given  Procedure consent: procedure consent matches procedure scheduled  Relevant documents: relevant documents present and verified  Test results: test results available and properly labeled  Site marked: the operative site was marked  Radiology Images displayed and confirmed   If images not available, report reviewed: imaging studies available  Required items: required blood products, implants, devices, and special equipment available  Patient identity confirmed: verbally with patient      Performed by: physician  Debridement type: selective  Pain control: lidocaine 4%  Pre-debridement measurements  Length (cm): 0 5  Width (cm): 0 8  Depth (cm): 0 4  Surface Area (cm^2): 0 4  Volume (cm^3): 0 16    Post-debridement measurements  Length (cm): 0 5  Width (cm): 0 8  Depth (cm): 0 4  Percent debrided: 100%  Surface Area (cm^2): 0 4  Area debrided (cm^2): 0 4  Volume (cm^3): 0 16  Devitalized tissue debrided: slough  Instrument(s) utilized: curette  Bleeding: small  Hemostasis obtained with: pressure  Procedural pain (0-10): 0  Post-procedural pain: 0   Response to treatment: procedure was tolerated well          Plan:     Wound 04/07/21 Diabetic Ulcer Leg Left; Lower (Active)   Wound Image Images linked 04/07/21 0838   Wound Description Yellow;Slough;Granulation tissue; Beefy red;Pink 04/07/21 0832   Luz Elena-wound Assessment Pink 04/07/21 0832   Wound Length (cm) 0 5 cm 04/07/21 0832   Wound Width (cm) 0 8 cm 04/07/21 0832   Wound Depth (cm) 0 4 cm 04/07/21 0832   Wound Surface Area (cm^2) 0 4 cm^2 04/07/21 0832   Wound Volume (cm^3) 0 16 cm^3 04/07/21 0832   Calculated Wound Volume (cm^3) 0 16 cm^3 04/07/21 0832   Undermining 0 3 04/07/21 0832   Undermining is depth extending from 3-6 o'Clock 04/07/21 0832       Wound 04/07/21 Diabetic Ulcer Leg Left; Lower (Active)   Date First Assessed/Time First Assessed: 04/07/21 0831   Primary Wound Type: Diabetic Ulcer  Location: Leg  Wound Location Orientation: Left; Lower       [REMOVED] Wound 07/29/20 Diabetic Ulcer Heel Right (Removed)   Resolved Date: 04/07/21  Date First Assessed: 07/29/20   Primary Wound Type: Diabetic Ulcer  Location: Heel  Wound Location Orientation: Right  Wound Description (Comments): Conde 4  Dressing Status: (c) Clean;Dry; Intact  Wound Outcome: Unknown (No l  [REMOVED] Wound 07/29/20 Pressure Injury Pretibial Left;Lateral (Removed)   Resolved Date: 04/07/21  Date First Assessed: 07/29/20   Primary Wound Type: Pressure Injury  Location: (c) Pretibial  Wound Location Orientation: Left;Lateral  Dressing Status: (c) Open to air  Wound Outcome: Unknown (No longer present)       Subjective: Gerhardt Sep     Pt being seen for ulcer on left lower ext stump  Was seen by PCP yesterday - me -  Was placed on abx as the stump was red and warm  X ray was ordered and done not red yet but my wet read does not seem to have signs of osteo  Pt states his pain is already decreased - he has two doses of abx in already  He has called the prosthesis person to adjust his prosthesis      The following portions of the patient's history were reviewed and updated as appropriate:   He  has a past medical history of Diabetes mellitus ([facility] ), Hyperlipidemia, and Hypertension  He   Patient Active Problem List    Diagnosis Date Noted    Ulcer of right heel, with fat layer exposed ([facility] ) 11/22/2020    Unilateral complete BKA ([facility] ) 08/19/2020    Diabetes mellitus with ulcer of foot ([facility] ) 08/19/2020    Ulcer of heel and midfoot, right, with fat layer exposed ([facility] ) 08/19/2020    Pressure ulcer of BKA stump, stage 2 ([place] [address] ) 05/04/2020    BMI 29 0-29 9,adult 04/11/2019    Gastroesophageal reflux disease without esophagitis 07/12/2018    Diabetes mellitus with peripheral circulatory disorder, controlled ([facility] ) 12/24/2015    Amputated below knee ([facility] ) 10/22/2015    Male erectile disorder 07/02/2014    Peripheral arterial disease ([facility] ) 07/02/2014    Hyperlipidemia 02/14/2013    Anxiety disorder 09/04/2012    Benign essential hypertension 09/04/2012     He  has a past surgical history that includes Below knee leg amputation  His family history includes No Known Problems in his mother  He  reports that he quit smoking about 18 years ago  He has never used smokeless tobacco  He reports previous alcohol use  He reports that he does not use drugs    Current Outpatient Medications   Medication Sig Dispense Refill    amLODIPine (NORVASC) 2 5 mg tablet TAKE 1 TABLET BY MOUTH EVERY DAY 90 tablet 0    B-D UF III MINI PEN NEEDLES 31G X 5 MM MISC USE AS DIRECTED 50 each 0    CHUYITA MICROLET LANCETS lancets by Does not apply route      clindamycin (CLEOCIN) 300 MG capsule Take 1 capsule (300 mg total) by mouth 3 (three) times a day for 10 days 30 capsule 0    collagenase (SANTYL) ointment Apply topically daily 15 g 0    glucose blood ([employer] CONTOUR TEST) test strip by In Vitro route      Insulin Pen Needle 31G X 5 MM MISC BD Ultra-Fine Mini Pen Needle 31 gauge x 3/16"      Lantus SoloStar 100 units/mL injection pen INJECT 68 UNITS UNDER THE SKIN DAILY 15 mL 0    lisinopril (ZESTRIL) 40 mg tablet TAKE 1 TABLET BY MOUTH EVERY DAY 10 tablet 0    rosuvastatin (CRESTOR) 20 MG tablet TAKE 1 TABLET BY MOUTH EVERY DAY 90 tablet 0    sildenafil (VIAGRA) 100 mg tablet Take by mouth      cadexomer iodine (IODOSORB) 0 9 % gel Apply 1 application topically daily as needed for wound care (Patient not taking: Reported on 4/6/2021) 40 g 0    oxymetazoline (AFRIN) 0 05 % nasal spray 2 sprays by Each Nare route daily administer prior to HBO treatments       No current facility-administered medications for this visit  He is allergic to bee venom and penicillins       Review of Systems   Constitutional: Negative for activity change, appetite change, chills, diaphoresis, fatigue, fever and unexpected weight change  HENT: Negative for congestion, dental problem, ear pain, mouth sores, sinus pressure, sinus pain, sore throat and trouble swallowing  Eyes: Negative for photophobia, discharge and itching  Respiratory: Negative for apnea, chest tightness and shortness of breath  Cardiovascular: Negative for chest pain, palpitations and leg swelling  Gastrointestinal: Negative for abdominal distention, abdominal pain, blood in stool, nausea and vomiting  Endocrine: Negative for cold intolerance, heat intolerance, polydipsia, polyphagia and polyuria  Genitourinary: Negative for difficulty urinating  Musculoskeletal: Negative for arthralgias  Skin: Positive for wound  Negative for color change     Neurological: Negative for dizziness, syncope, speech difficulty and headaches  Hematological: Negative for adenopathy  Psychiatric/Behavioral: Negative for agitation and behavioral problems  Objective:       Wound 04/07/21 Diabetic Ulcer Leg Left; Lower (Active)   Wound Image Images linked 04/07/21 0838   Wound Description Yellow;Slough;Granulation tissue; Beefy red;Pink 04/07/21 0832   Luz Elena-wound Assessment Pink 04/07/21 0832   Wound Length (cm) 0 5 cm 04/07/21 0832   Wound Width (cm) 0 8 cm 04/07/21 0832   Wound Depth (cm) 0 4 cm 04/07/21 0832   Wound Surface Area (cm^2) 0 4 cm^2 04/07/21 0832   Wound Volume (cm^3) 0 16 cm^3 04/07/21 0832   Calculated Wound Volume (cm^3) 0 16 cm^3 04/07/21 0832   Undermining 0 3 04/07/21 0832   Undermining is depth extending from 3-6 o'Clock 04/07/21 0832       /96   Pulse 75   Temp 97 6 °F (36 4 °C)   Ht 5' 7" (1 702 m)   Wt 82 1 kg (181 lb)   BMI 28 35 kg/m²     Physical Exam  Vitals signs and nursing note reviewed  Constitutional:       General: He is not in acute distress  Appearance: He is well-developed  He is not ill-appearing, toxic-appearing or diaphoretic  HENT:      Head: Normocephalic and atraumatic  Nose: Nose normal    Eyes:      General:         Right eye: No discharge  Left eye: No discharge  Conjunctiva/sclera: Conjunctivae normal    Neck:      Musculoskeletal: Normal range of motion  Pulmonary:      Effort: Pulmonary effort is normal  No respiratory distress  Skin:     Comments: See wound analysis   Neurological:      Mental Status: He is alert  Wound Instructions:  Orders Placed This Encounter   Procedures    Wound cleansing and dressings     LLE wound:  Wash your hands with soap and water  Remove old dressing, discard into plastic bag and place in trash  Cleanse the wound with soap and water prior to applying a clean dressing  Do not use tissue or cotton balls  Do not scrub the wound  Pat dry using gauze    Shower yes   Apply skin prep to skin surrounding wound  Apply Purachol AG to the LLE wound  Cover with 2X2 hydrocolloid  Change dressing every 3 days  This was done here today  Standing Status:   Future     Standing Expiration Date:   4/7/2022    Wound off loading     Do not apply pressure to wound- follow up with  DME regarding prosthesis     Standing Status:   Future     Standing Expiration Date:   4/7/2022    Debridement     This order was created via procedure documentation        Diagnosis ICD-10-CM Associated Orders   1  Diabetic ulcer of left lower leg ([facility])  E11 622 lidocaine (XYLOCAINE) 4 % topical solution 5 mL    L94 923 Wound cleansing and dressings     Wound off loading     Debridement   2  Diabetes mellitus with peripheral circulatory disorder, controlled ([facility])  E11 51    3   Unilateral complete below-knee amputation, sequela ([facility] )  L84 504I

## 2021-04-07 NOTE — PATIENT INSTRUCTIONS
Orders Placed This Encounter   Procedures    Wound cleansing and dressings     LLE wound:  Wash your hands with soap and water  Remove old dressing, discard into plastic bag and place in trash  Cleanse the wound with soap and water prior to applying a clean dressing  Do not use tissue or cotton balls  Do not scrub the wound  Pat dry using gauze  Shower yes   Apply skin prep to skin surrounding wound  Apply Purachol AG to the LLE wound  Cover with 2X2 hydrocolloid  Change dressing every 3 days  This was done here today       Standing Status:   Future     Standing Expiration Date:   4/7/2022    Wound off loading     Do not apply pressure to wound- follow up with  DME regarding prosthesis     Standing Status:   Future     Standing Expiration Date:   4/7/2022    Debridement     This order was created via procedure documentation

## 2021-04-14 ENCOUNTER — OFFICE VISIT (OUTPATIENT)
Dept: WOUND CARE | Facility: HOSPITAL | Age: 56
End: 2021-04-14
Payer: COMMERCIAL

## 2021-04-14 VITALS
TEMPERATURE: 97.1 F | DIASTOLIC BLOOD PRESSURE: 84 MMHG | SYSTOLIC BLOOD PRESSURE: 164 MMHG | RESPIRATION RATE: 16 BRPM | HEART RATE: 76 BPM

## 2021-04-14 DIAGNOSIS — E11.622 DIABETIC ULCER OF LEFT LOWER LEG (HCC): Primary | ICD-10-CM

## 2021-04-14 DIAGNOSIS — L97.929 DIABETIC ULCER OF LEFT LOWER LEG (HCC): Primary | ICD-10-CM

## 2021-04-14 PROCEDURE — 97597 DBRDMT OPN WND 1ST 20 CM/<: CPT | Performed by: FAMILY MEDICINE

## 2021-04-14 NOTE — PATIENT INSTRUCTIONS
Orders Placed This Encounter   Procedures    Wound cleansing and dressings     LLE wound:  Wash your hands with soap and water  Remove old dressing, discard into plastic bag and place in trash  Cleanse the wound with soap and water prior to applying a clean dressing  Do not use tissue or cotton balls  Do not scrub the wound  Pat dry using gauze  Shower yes   Apply skin prep to skin surrounding wound  Apply Puracol AG to the wound  Cover with 2X2 hydrocolloid  Change dressing every 3 days  This was done  today           Standing Status:   Future     Standing Expiration Date:   4/14/2022    Wound off loading     Off-loading Instructions:    Keep weight and pressure off wound at all times  Wear prosthesis minimally       Standing Status:   Future     Standing Expiration Date:   4/14/2022

## 2021-04-14 NOTE — PROGRESS NOTES
Assessment/Plan:    1  Diabetes mellitus with peripheral circulatory disorder, controlled [facility])  Assessment & Plan:    Lab Results   Component Value Date    HGBA1C 6 9 (H) 03/24/2020       Good care was encouraged and he continues to follow with the wound care center  Orders:  -     CBC and differential; Future  -     Comprehensive metabolic panel; Future  -     Lipid panel; Future  -     HEMOGLOBIN A1C W/ EAG ESTIMATION; Future  -     IRIS Diabetic eye exam  -     CBC and differential  -     Comprehensive metabolic panel  -     Lipid panel  -     HEMOGLOBIN A1C W/ EAG ESTIMATION  -     insulin glargine (Lantus SoloStar) 100 units/mL injection pen; Inject 68 Units under the skin daily    2  Type 2 diabetes mellitus with foot ulcer, unspecified whether long term insulin use (HCC)  Assessment & Plan:    Lab Results   Component Value Date    HGBA1C 6 9 (H) 03/24/2020     Control has been good but he is overdue for labwork and this was ordered  Continue current medications for now and will adjust as necessary after labwork  Advised on the need for good foot and eye care  Orders:  -     CBC and differential; Future  -     Comprehensive metabolic panel; Future  -     Lipid panel; Future  -     HEMOGLOBIN A1C W/ EAG ESTIMATION; Future  -     IRIS Diabetic eye exam  -     CBC and differential  -     Comprehensive metabolic panel  -     Lipid panel  -     HEMOGLOBIN A1C W/ EAG ESTIMATION    3  Benign essential hypertension  Assessment & Plan:  Control is adequate and he will continue current medications  Orders:  -     CBC and differential; Future  -     Comprehensive metabolic panel; Future  -     Lipid panel; Future  -     CBC and differential  -     Comprehensive metabolic panel  -     Lipid panel    4  Hyperlipidemia, unspecified hyperlipidemia type  Assessment & Plan: Will check labs for lipid control  Encouraged low fat diet       Orders:  -     CBC and differential; Future  -     Comprehensive metabolic panel; Future  -     Lipid panel; Future  -     CBC and differential  -     Comprehensive metabolic panel  -     Lipid panel    5  Pressure ulcer of BKA stump, stage 2 ([facility] [address] )  Assessment & Plan:  Managed by wound care  6  Ulcer of right heel, with fat layer exposed ([facility] )  Assessment & Plan: This is improving and has closed  Continue wound care follow up  7  Unilateral complete below-knee amputation, sequela ([facility] [address] )    8  BMI 29 0-29 9,adult         BMI Counseling: Body mass index is 29 29 kg/m²  The BMI is above normal  Nutrition recommendations include reducing portion sizes and decreasing overall calorie intake  Exercise recommendations include moderate aerobic physical activity for 150 minutes/week  There are no Patient Instructions on file for this visit  Return in about 4 months (around 8/15/2021)  Subjective:      Patient ID: Andrew Frederick is a 60 y o  male  Chief Complaint   Patient presents with    Follow-up     [facility]    Diabetes       His heel wound is healed  He has been going to wound care  Glucose has been lower than 150's, usually around 120  Has not gotten his labwork  He denies hypoglycemic episodes  Is compliant with her medications  Is overdue for eye exam and will schedule  He denies chest pain, dyspnea, cough, fever  The following portions of the patient's history were reviewed and updated as appropriate: allergies, current medications, past family history, past medical history, past social history, past surgical history and problem list     Review of Systems   Constitutional: Negative  Respiratory: Negative  Cardiovascular: Negative  Endocrine: Negative  Skin:        Heel would closed           Current Outpatient Medications   Medication Sig Dispense Refill    amLODIPine (NORVASC) 2 5 mg tablet TAKE 1 TABLET BY MOUTH EVERY DAY 90 tablet 0    B-D UF III MINI PEN NEEDLES 31G X 5 MM MISC USE AS DIRECTED 50 each 0    CHUYITA MICROLET LANCETS lancets by Does not apply route      clindamycin (CLEOCIN) 300 MG capsule Take 1 capsule (300 mg total) by mouth 3 (three) times a day for 10 days 30 capsule 0    glucose blood (CHUYITA CONTOUR TEST) test strip by In Vitro route      insulin glargine (Lantus SoloStar) 100 units/mL injection pen Inject 68 Units under the skin daily 45 mL 3    Insulin Pen Needle 31G X 5 MM MISC BD Ultra-Fine Mini Pen Needle 31 gauge x 3/16"      lisinopril (ZESTRIL) 40 mg tablet TAKE 1 TABLET BY MOUTH EVERY DAY 10 tablet 0    rosuvastatin (CRESTOR) 20 MG tablet TAKE 1 TABLET BY MOUTH EVERY DAY 90 tablet 0    oxymetazoline (AFRIN) 0 05 % nasal spray 2 sprays by Each Nare route daily administer prior to HBO treatments      sildenafil (VIAGRA) 100 mg tablet Take by mouth       No current facility-administered medications for this visit  Objective:    /80   Pulse 67   Temp 98 °F (36 7 °C)   Resp 18   Ht 5' 7" (1 702 m)   Wt 84 8 kg (187 lb)   SpO2 97%   BMI 29 29 kg/m²        Physical Exam  Constitutional:       Appearance: He is well-developed  Eyes:      Conjunctiva/sclera: Conjunctivae normal    Neck:      Musculoskeletal: Neck supple  Thyroid: No thyromegaly  Vascular: No JVD  Cardiovascular:      Rate and Rhythm: Normal rate and regular rhythm  Pulses: no weak pulses          Dorsalis pedis pulses are 0 on the right side  Heart sounds: Normal heart sounds  No murmur  No friction rub  No gallop  Pulmonary:      Effort: Pulmonary effort is normal       Breath sounds: Normal breath sounds  No wheezing or rales  Abdominal:      General: Bowel sounds are normal  There is no distension  Palpations: Abdomen is soft  Tenderness: There is no abdominal tenderness  Musculoskeletal:        Feet:    Feet:      Right foot:      Skin integrity: No ulcer, skin breakdown, erythema, warmth, callus or dry skin  Left foot: amputated        Patient's shoes and socks removed  Right Foot/Ankle   Right Foot Inspection  Skin Exam: skin normal skin not intact, no dry skin, no warmth, no callus, no erythema, no maceration, no abnormal color, no pre-ulcer, no ulcer and no callus                          Toe Exam: ROM and strength within normal limits  Sensory       Monofilament testing: absent  Vascular  Capillary refills: elevated  The right DP pulse is 0  Left Foot/Ankle  Left Foot Inspection                         Amputation: amputation left foot                       Assign Risk Category:  Deformity present; Loss of protective sensation;  No weak pulses       Risk: 3             Africa Pack MD

## 2021-04-14 NOTE — PROGRESS NOTES
Patient ID: Cy King is a 60 y o  male Date of Birth 1965     Chief Complaint  Chief Complaint   Patient presents with    Follow Up Wound Care Visit     LLE wound     Cont off the prosthesis  Pt seems to be doing well  Pain and warmth is much less        Allergies  Bee venom and Penicillins    Assessment:    No problem-specific Assessment & Plan notes found for this encounter  Diagnoses and all orders for this visit:    Diabetic ulcer of left lower leg ([address] )  -     Wound cleansing and dressings; Future  -     Wound off loading; Future    Other orders  -     Debridement              Debridement   Wound 04/07/21 Diabetic Ulcer Leg Left; Lower    Universal Protocol:  Consent: Verbal consent obtained  Risks and benefits: risks, benefits and alternatives were discussed  Consent given by: patient  Time out: Immediately prior to procedure a "time out" was called to verify the correct patient, procedure, equipment, support staff and site/side marked as required  Timeout called at: 4/14/2021 9:38 AM   Patient understanding: patient states understanding of the procedure being performed  Patient consent: the patient's understanding of the procedure matches consent given  Procedure consent: procedure consent matches procedure scheduled  Relevant documents: relevant documents present and verified  Test results: test results available and properly labeled  Site marked: the operative site was marked  Radiology Images displayed and confirmed   If images not available, report reviewed: imaging studies available  Required items: required blood products, implants, devices, and special equipment available  Patient identity confirmed: verbally with patient      Performed by: physician  Debridement type: selective  Pain control: lidocaine 4%  Pre-debridement measurements  Length (cm): 1  Width (cm): 0 4  Depth (cm): 1  Surface Area (cm^2): 0 4  Volume (cm^3): 0 4    Post-debridement measurements  Length (cm): 1  Width (cm): 0 4  Depth (cm): 1  Percent debrided: 100%  Surface Area (cm^2): 0 4  Area debrided (cm^2): 0 4  Volume (cm^3): 0 4  Devitalized tissue debrided: slough  Instrument(s) utilized: curette  Bleeding: small  Hemostasis obtained with: pressure  Procedural pain (0-10): 0  Post-procedural pain: 0   Response to treatment: procedure was tolerated well          Plan:     Wound 04/07/21 Diabetic Ulcer Leg Left; Lower (Active)   Wound Image Images linked 04/14/21 0833   Wound Description Granulation tissue;Pink 04/14/21 0833   Luz Elena-wound Assessment Dry; Other (Comment) (Callus) 04/14/21 0833   Wound Length (cm) 1 cm 04/14/21 0833   Wound Width (cm) 0 4 cm 04/14/21 0833   Wound Depth (cm) 1 cm 04/14/21 0833   Wound Surface Area (cm^2) 0 4 cm^2 04/14/21 0833   Wound Volume (cm^3) 0 4 cm^3 04/14/21 0833   Calculated Wound Volume (cm^3) 0 4 cm^3 04/14/21 0833   Change in Wound Size % -150 04/14/21 0833   Drainage Amount None 04/14/21 0833   Non-staged Wound Description Full thickness 04/14/21 0833   Dressing Status Other (Comment) (no dressing upon arrival) 04/14/21 0833       Wound 04/07/21 Diabetic Ulcer Leg Left; Lower (Active)   Date First Assessed/Time First Assessed: 04/07/21 0831   Primary Wound Type: Diabetic Ulcer  Location: Leg  Wound Location Orientation: Left; Lower       [REMOVED] Wound 07/29/20 Diabetic Ulcer Heel Right (Removed)   Resolved Date: 04/07/21  Date First Assessed: 07/29/20   Primary Wound Type: Diabetic Ulcer  Location: Heel  Wound Location Orientation: Right  Wound Description (Comments): Conde 4  Dressing Status: (c) Clean;Dry; Intact  Wound Outcome: Unknown (No l         [REMOVED] Wound 07/29/20 Pressure Injury Pretibial Left;Lateral (Removed)   Resolved Date: 04/07/21  Date First Assessed: 07/29/20   Primary Wound Type: Pressure Injury  Location: (c) Pretibial  Wound Location Orientation: Left;Lateral  Dressing Status: (c) Open to air  Wound Outcome: Unknown (No longer present)       [REMOVED] Wound 08/19/20 Pressure Injury Knee Anterior; Left (Removed)   Resolved Date/Resolved Time: 10/16/20 1612  Date First Assessed/Time First Assessed: 08/19/20 1146   Primary Wound Type: Pressure Injury  Location: Knee  Wound Location Orientation: Anterior; Left  Dressing Status: (c) Open to air       [REMOVED] Wound 10/02/20 Traumatic Skin tear Pretibial Right; Anterior (Removed)   Resolved Date/Resolved Time: 11/20/20 1318  Date First Assessed/Time First Assessed: 10/02/20 1545   Pre-Existing Wound: No  Primary Wound Type: Traumatic  Traumatic Wound Type: Skin tear  Location: Pretibial  Wound Location Orientation: Right; Anterio    Subjective: Gerhardt Sep Pt here to follow up wound on left lower ext   Pt seems to be doing well  Is limiting the use of the orthotic  Pain has improved      The following portions of the patient's history were reviewed and updated as appropriate:   He  has a past medical history of Diabetes mellitus ([facility] [address] ), Hyperlipidemia, and Hypertension  He   Patient Active Problem List    Diagnosis Date Noted    Ulcer of right heel, with fat layer exposed ([facility] [address] ) 11/22/2020    Unilateral complete BKA ([facility] [address] ) 08/19/2020    Diabetes mellitus with ulcer of foot ([facility] [address] ) 08/19/2020    Ulcer of heel and midfoot, right, with fat layer exposed ([facility] [address] ) 08/19/2020    Pressure ulcer of BKA stump, stage 2 ([facility] [address] ) 05/04/2020    BMI 29 0-29 9,adult 04/11/2019    Gastroesophageal reflux disease without esophagitis 07/12/2018    Diabetes mellitus with peripheral circulatory disorder, controlled ([address] ) 12/24/2015    Amputated below knee ([facility] [address] ) 10/22/2015    Male erectile disorder 07/02/2014    Peripheral arterial disease ([address] ) 07/02/2014    Hyperlipidemia 02/14/2013    Anxiety disorder 09/04/2012    Benign essential hypertension 09/04/2012     He  has a past surgical history that includes Below knee leg amputation  His family history includes No Known Problems in his mother    He  reports that he quit smoking about 18 years ago  He has never used smokeless tobacco  He reports previous alcohol use  He reports that he does not use drugs  Current Outpatient Medications   Medication Sig Dispense Refill    amLODIPine (NORVASC) 2 5 mg tablet TAKE 1 TABLET BY MOUTH EVERY DAY 90 tablet 0    B-D UF III MINI PEN NEEDLES 31G X 5 MM MISC USE AS DIRECTED 50 each 0    CHUYITA MICROLET LANCETS lancets by Does not apply route      cadexomer iodine (IODOSORB) 0 9 % gel Apply 1 application topically daily as needed for wound care (Patient not taking: Reported on 4/6/2021) 40 g 0    clindamycin (CLEOCIN) 300 MG capsule Take 1 capsule (300 mg total) by mouth 3 (three) times a day for 10 days 30 capsule 0    collagenase (SANTYL) ointment Apply topically daily 15 g 0    glucose blood (IQMS CONTOUR TEST) test strip by In Vitro route      Insulin Pen Needle 31G X 5 MM MISC BD Ultra-Fine Mini Pen Needle 31 gauge x 3/16"      Lantus SoloStar 100 units/mL injection pen INJECT 68 UNITS UNDER THE SKIN DAILY 15 mL 0    lisinopril (ZESTRIL) 40 mg tablet TAKE 1 TABLET BY MOUTH EVERY DAY 10 tablet 0    oxymetazoline (AFRIN) 0 05 % nasal spray 2 sprays by Each Nare route daily administer prior to HBO treatments      rosuvastatin (CRESTOR) 20 MG tablet TAKE 1 TABLET BY MOUTH EVERY DAY 90 tablet 0    sildenafil (VIAGRA) 100 mg tablet Take by mouth       No current facility-administered medications for this visit  He is allergic to bee venom and penicillins       Review of Systems   Constitutional: Negative for activity change, appetite change, chills, diaphoresis, fatigue, fever and unexpected weight change  HENT: Negative for congestion, dental problem, ear pain, mouth sores, sinus pressure, sinus pain, sore throat and trouble swallowing  Eyes: Negative for photophobia, discharge and itching  Respiratory: Negative for apnea, chest tightness and shortness of breath      Cardiovascular: Negative for chest pain, palpitations and leg swelling  Gastrointestinal: Negative for abdominal distention, abdominal pain, blood in stool, nausea and vomiting  Endocrine: Negative for cold intolerance, heat intolerance, polydipsia, polyphagia and polyuria  Genitourinary: Negative for difficulty urinating  Musculoskeletal: Negative for arthralgias  Skin: Positive for wound  Negative for color change  Neurological: Negative for dizziness, syncope, speech difficulty and headaches  Hematological: Negative for adenopathy  Psychiatric/Behavioral: Negative for agitation and behavioral problems  Objective:       Wound 04/07/21 Diabetic Ulcer Leg Left; Lower (Active)   Wound Image Images linked 04/14/21 0833   Wound Description Granulation tissue;Pink 04/14/21 0833   Luz Elena-wound Assessment Dry; Other (Comment) (Callus) 04/14/21 0833   Wound Length (cm) 1 cm 04/14/21 0833   Wound Width (cm) 0 4 cm 04/14/21 0833   Wound Depth (cm) 1 cm 04/14/21 0833   Wound Surface Area (cm^2) 0 4 cm^2 04/14/21 0833   Wound Volume (cm^3) 0 4 cm^3 04/14/21 0833   Calculated Wound Volume (cm^3) 0 4 cm^3 04/14/21 0833   Change in Wound Size % -150 04/14/21 0833   Drainage Amount None 04/14/21 0833   Non-staged Wound Description Full thickness 04/14/21 0833   Dressing Status Other (Comment) (no dressing upon arrival) 04/14/21 0833       /84   Pulse 76   Temp (!) 97 1 °F (36 2 °C)   Resp 16     Physical Exam  Vitals signs and nursing note reviewed  Constitutional:       General: He is not in acute distress  Appearance: He is well-developed  He is not ill-appearing, toxic-appearing or diaphoretic  HENT:      Head: Normocephalic and atraumatic  Nose: Nose normal    Eyes:      General:         Right eye: No discharge  Left eye: No discharge  Conjunctiva/sclera: Conjunctivae normal    Neck:      Musculoskeletal: Normal range of motion  Pulmonary:      Effort: Pulmonary effort is normal  No respiratory distress     Skin:     Comments: See wound analysis   Neurological:      Mental Status: He is alert  Wound Instructions:  Orders Placed This Encounter   Procedures    Wound cleansing and dressings     LLE wound:  Wash your hands with soap and water  Remove old dressing, discard into plastic bag and place in trash  Cleanse the wound with soap and water prior to applying a clean dressing  Do not use tissue or cotton balls  Do not scrub the wound  Pat dry using gauze  Shower yes   Apply skin prep to skin surrounding wound  Apply Puracol AG to the wound  Cover with 2X2 hydrocolloid  Change dressing every 3 days  This was done  today           Standing Status:   Future     Standing Expiration Date:   4/14/2022    Wound off loading     Off-loading Instructions:    Keep weight and pressure off wound at all times  Wear prosthesis minimally  Standing Status:   Future     Standing Expiration Date:   4/14/2022    Debridement     This order was created via procedure documentation        Diagnosis ICD-10-CM Associated Orders   1   Diabetic ulcer of left lower leg (HCC)  E11 622 Wound cleansing and dressings    L97 929 Wound off loading

## 2021-04-15 ENCOUNTER — OFFICE VISIT (OUTPATIENT)
Dept: FAMILY MEDICINE CLINIC | Facility: CLINIC | Age: 56
End: 2021-04-15
Payer: COMMERCIAL

## 2021-04-15 VITALS
SYSTOLIC BLOOD PRESSURE: 138 MMHG | RESPIRATION RATE: 18 BRPM | HEART RATE: 67 BPM | DIASTOLIC BLOOD PRESSURE: 80 MMHG | WEIGHT: 187 LBS | TEMPERATURE: 98 F | OXYGEN SATURATION: 97 % | HEIGHT: 67 IN | BODY MASS INDEX: 29.35 KG/M2

## 2021-04-15 DIAGNOSIS — L97.412 ULCER OF RIGHT HEEL, WITH FAT LAYER EXPOSED (HCC): ICD-10-CM

## 2021-04-15 DIAGNOSIS — E78.5 HYPERLIPIDEMIA, UNSPECIFIED HYPERLIPIDEMIA TYPE: ICD-10-CM

## 2021-04-15 DIAGNOSIS — L97.509 TYPE 2 DIABETES MELLITUS WITH FOOT ULCER, UNSPECIFIED WHETHER LONG TERM INSULIN USE (HCC): ICD-10-CM

## 2021-04-15 DIAGNOSIS — I10 BENIGN ESSENTIAL HYPERTENSION: ICD-10-CM

## 2021-04-15 DIAGNOSIS — L89.892 PRESSURE ULCER OF BKA STUMP, STAGE 2 (HCC): ICD-10-CM

## 2021-04-15 DIAGNOSIS — E11.621 TYPE 2 DIABETES MELLITUS WITH FOOT ULCER, UNSPECIFIED WHETHER LONG TERM INSULIN USE (HCC): ICD-10-CM

## 2021-04-15 DIAGNOSIS — S88.119S: ICD-10-CM

## 2021-04-15 DIAGNOSIS — T87.89 PRESSURE ULCER OF BKA STUMP, STAGE 2 (HCC): ICD-10-CM

## 2021-04-15 DIAGNOSIS — E11.51 DIABETES MELLITUS WITH PERIPHERAL CIRCULATORY DISORDER, CONTROLLED (HCC): Primary | ICD-10-CM

## 2021-04-15 PROCEDURE — 99214 OFFICE O/P EST MOD 30 MIN: CPT | Performed by: INTERNAL MEDICINE

## 2021-04-15 RX ORDER — INSULIN GLARGINE 100 [IU]/ML
68 INJECTION, SOLUTION SUBCUTANEOUS DAILY
Qty: 45 ML | Refills: 3 | Status: SHIPPED | OUTPATIENT
Start: 2021-04-15 | End: 2021-11-03

## 2021-04-15 NOTE — ASSESSMENT & PLAN NOTE
Lab Results   Component Value Date    HGBA1C 6 9 (H) 03/24/2020     Control has been good but he is overdue for labwork and this was ordered  Continue current medications for now and will adjust as necessary after labwork  Advised on the need for good foot and eye care

## 2021-04-15 NOTE — ASSESSMENT & PLAN NOTE
Lab Results   Component Value Date    HGBA1C 6 9 (H) 03/24/2020       Good care was encouraged and he continues to follow with the wound care center

## 2021-04-28 ENCOUNTER — OFFICE VISIT (OUTPATIENT)
Dept: WOUND CARE | Facility: HOSPITAL | Age: 56
End: 2021-04-28
Payer: COMMERCIAL

## 2021-04-28 VITALS
TEMPERATURE: 96.9 F | SYSTOLIC BLOOD PRESSURE: 191 MMHG | HEART RATE: 73 BPM | DIASTOLIC BLOOD PRESSURE: 94 MMHG | RESPIRATION RATE: 16 BRPM

## 2021-04-28 DIAGNOSIS — E11.622 DIABETIC ULCER OF LEFT LOWER LEG (HCC): Primary | ICD-10-CM

## 2021-04-28 DIAGNOSIS — L97.929 DIABETIC ULCER OF LEFT LOWER LEG (HCC): Primary | ICD-10-CM

## 2021-04-28 DIAGNOSIS — S88.119S: ICD-10-CM

## 2021-04-28 PROCEDURE — 97597 DBRDMT OPN WND 1ST 20 CM/<: CPT | Performed by: FAMILY MEDICINE

## 2021-04-28 PROCEDURE — 99213 OFFICE O/P EST LOW 20 MIN: CPT | Performed by: FAMILY MEDICINE

## 2021-04-28 RX ORDER — LIDOCAINE HYDROCHLORIDE 40 MG/ML
5 SOLUTION TOPICAL ONCE
Status: COMPLETED | OUTPATIENT
Start: 2021-04-28 | End: 2021-04-28

## 2021-04-28 RX ADMIN — LIDOCAINE HYDROCHLORIDE 5 ML: 40 SOLUTION TOPICAL at 09:41

## 2021-04-28 NOTE — PROGRESS NOTES
Patient ID: Gage Monet is a 60 y o  male Date of Birth 1965     Chief Complaint  Chief Complaint   Patient presents with    Follow Up Wound Care Visit     left stump wound       Allergies  Bee venom and Penicillins    Assessment:    No problem-specific Assessment & Plan notes found for this encounter  Diagnoses and all orders for this visit:    Diabetic ulcer of left lower leg (HCC)  -     lidocaine (XYLOCAINE) 4 % topical solution 5 mL  -     Wound cleansing and dressings; Future  -     Wound off loading; Future  -     Wound cleansing and dressings; Future  -     Debridement  -     Debridement    Unilateral complete below-knee amputation, sequela (HCC)              Debridement   Wound 04/07/21 Diabetic Ulcer Leg Left; Lower    Universal Protocol:  Consent: Verbal consent obtained  Risks and benefits: risks, benefits and alternatives were discussed  Consent given by: patient  Time out: Immediately prior to procedure a "time out" was called to verify the correct patient, procedure, equipment, support staff and site/side marked as required  Patient understanding: patient states understanding of the procedure being performed  Patient consent: the patient's understanding of the procedure matches consent given  Procedure consent: procedure consent matches procedure scheduled  Relevant documents: relevant documents present and verified  Test results: test results available and properly labeled  Site marked: the operative site was marked  Radiology Images displayed and confirmed   If images not available, report reviewed: imaging studies available  Required items: required blood products, implants, devices, and special equipment available  Patient identity confirmed: verbally with patient      Performed by: physician  Debridement type: selective  Pain control: lidocaine 4%  Pre-debridement measurements  Length (cm): 0 9  Width (cm): 0 1  Depth (cm): 1  Surface Area (cm^2): 0 09  Volume (cm^3): 0 09    Post-debridement measurements  Length (cm): 0 9  Width (cm): 0 1  Depth (cm): 1  Percent debrided: 100%  Surface Area (cm^2): 0 09  Area debrided (cm^2): 0 09  Volume (cm^3): 0 09  Devitalized tissue debrided: biofilm, callus and slough  Instrument(s) utilized: curette  Bleeding: small  Hemostasis obtained with: pressure  Procedural pain (0-10): 0  Post-procedural pain: 0   Response to treatment: procedure was tolerated well    Debridement   Wound 04/28/21 Diabetic Ulcer Knee Left;Medial    Universal Protocol:  Consent: Verbal consent obtained  Risks and benefits: risks, benefits and alternatives were discussed  Consent given by: patient  Time out: Immediately prior to procedure a "time out" was called to verify the correct patient, procedure, equipment, support staff and site/side marked as required  Timeout called at: 4/28/2021 10:06 AM   Patient understanding: patient states understanding of the procedure being performed  Patient consent: the patient's understanding of the procedure matches consent given  Procedure consent: procedure consent matches procedure scheduled  Relevant documents: relevant documents present and verified  Test results: test results available and properly labeled  Site marked: the operative site was marked  Radiology Images displayed and confirmed   If images not available, report reviewed: imaging studies available  Required items: required blood products, implants, devices, and special equipment available  Patient identity confirmed: verbally with patient      Performed by: physician  Debridement type: selective  Pain control: lidocaine 4%  Pre-debridement measurements  Length (cm): 0 5  Width (cm): 0 9  Depth (cm): 0 1  Surface Area (cm^2): 0 45  Volume (cm^3): 0 05    Post-debridement measurements  Length (cm): 0 5  Width (cm): 0 9  Depth (cm): 0 1  Percent debrided: 100%  Surface Area (cm^2): 0 45  Area debrided (cm^2): 0 45  Volume (cm^3): 0 05  Devitalized tissue debrided: biofilm and callus  Instrument(s) utilized: curette  Bleeding: small  Hemostasis obtained with: pressure  Procedural pain (0-10): 0  Post-procedural pain: 0   Response to treatment: procedure was tolerated well          Plan:     Wound 04/07/21 Diabetic Ulcer Leg Left; Lower (Active)   Wound Image Images linked 04/28/21 0935   Wound Description Granulation tissue;Pink 04/28/21 0933   Luz Elena-wound Assessment Dry;Callus 04/28/21 0933   Wound Length (cm) 0 9 cm 04/28/21 0933   Wound Width (cm) 0 1 cm 04/28/21 0933   Wound Depth (cm) 1 cm 04/28/21 0933   Wound Surface Area (cm^2) 0 09 cm^2 04/28/21 0933   Wound Volume (cm^3) 0 09 cm^3 04/28/21 0933   Calculated Wound Volume (cm^3) 0 09 cm^3 04/28/21 0933   Change in Wound Size % 43 75 04/28/21 0933   Undermining 0 3 04/28/21 0933   Undermining is depth extending from 12-12 04/28/21 0933   Drainage Amount None 04/28/21 0933   Non-staged Wound Description Full thickness 04/28/21 0933   Dressing Status Other (Comment) (no dressing upon arrival) 04/28/21 0933       Wound 04/28/21 Diabetic Ulcer Knee Left;Medial (Active)   Wound Image Images linked 04/28/21 0939   Wound Description Granulation tissue;Slough; Yellow; Beefy red 04/28/21 0938   Luz Elena-wound Assessment Erythema 04/28/21 0938   Wound Length (cm) 0 5 cm 04/28/21 0938   Wound Width (cm) 0 9 cm 04/28/21 0938   Wound Depth (cm) 0 1 cm 04/28/21 0938   Wound Surface Area (cm^2) 0 45 cm^2 04/28/21 0938   Wound Volume (cm^3) 0 05 cm^3 04/28/21 0938   Calculated Wound Volume (cm^3) 0 05 cm^3 04/28/21 0938   Drainage Amount Scant 04/28/21 0938   Drainage Description Serous 04/28/21 0938   Non-staged Wound Description Full thickness 04/28/21 0938   Dressing Status Other (Comment) (no dressing) 04/28/21 0938       Wound 04/07/21 Diabetic Ulcer Leg Left; Lower (Active)   Date First Assessed/Time First Assessed: 04/07/21 0831   Primary Wound Type: Diabetic Ulcer  Location: Leg  Wound Location Orientation: Left; Lower       Wound 04/28/21 Diabetic Ulcer Knee Left;Medial (Active)   Date First Assessed/Time First Assessed: 04/28/21 0937   Primary Wound Type: Diabetic Ulcer  Location: Knee  Wound Location Orientation: Left;Medial  Wound Description (Comments): Conde 1       [REMOVED] Wound 07/29/20 Diabetic Ulcer Heel Right (Removed)   Resolved Date: 04/07/21  Date First Assessed: 07/29/20   Primary Wound Type: Diabetic Ulcer  Location: Heel  Wound Location Orientation: Right  Wound Description (Comments): Conde 4  Dressing Status: (c) Clean;Dry; Intact  Wound Outcome: Unknown (No l  [REMOVED] Wound 07/29/20 Pressure Injury Pretibial Left;Lateral (Removed)   Resolved Date: 04/07/21  Date First Assessed: 07/29/20   Primary Wound Type: Pressure Injury  Location: (c) Pretibial  Wound Location Orientation: Left;Lateral  Dressing Status: (c) Open to air  Wound Outcome: Unknown (No longer present)       [REMOVED] Wound 08/19/20 Pressure Injury Knee Anterior; Left (Removed)   Resolved Date/Resolved Time: 10/16/20 1612  Date First Assessed/Time First Assessed: 08/19/20 1146   Primary Wound Type: Pressure Injury  Location: Knee  Wound Location Orientation: Anterior; Left  Dressing Status: (c) Open to air       [REMOVED] Wound 10/02/20 Traumatic Skin tear Pretibial Right; Anterior (Removed)   Resolved Date/Resolved Time: 11/20/20 1318  Date First Assessed/Time First Assessed: 10/02/20 1545   Pre-Existing Wound: No  Primary Wound Type: Traumatic  Traumatic Wound Type: Skin tear  Location: Pretibial  Wound Location Orientation: Right; Anterio    Subjective: Berna Carmona Pt is here to follow up wounds on his left lower ext stump  Pt's initial wound is improving, has developed an additional wound on medial aspect a little highjer up      The following portions of the patient's history were reviewed and updated as appropriate:   He  has a past medical history of Diabetes mellitus ([address] ), Hyperlipidemia, and Hypertension    He   Patient Active Problem List    Diagnosis Date Noted    Ulcer of right heel, with fat layer exposed ([place] [address] ) 11/22/2020    Unilateral complete BKA ([place] [address] ) 08/19/2020    Diabetes mellitus with ulcer of foot ([place] [address] ) 08/19/2020    Pressure ulcer of BKA stump, stage 2 ([place] [address] ) 05/04/2020    BMI 29 0-29 9,adult 04/11/2019    Gastroesophageal reflux disease without esophagitis 07/12/2018    Diabetes mellitus with peripheral circulatory disorder, controlled ([place] [address] ) 12/24/2015    Amputated below knee ([place] [address] ) 10/22/2015    Male erectile disorder 07/02/2014    Peripheral arterial disease ([place] [address] ) 07/02/2014    Hyperlipidemia 02/14/2013    Anxiety disorder 09/04/2012    Benign essential hypertension 09/04/2012     He  has a past surgical history that includes Below knee leg amputation  His family history includes No Known Problems in his mother  He  reports that he quit smoking about 18 years ago  He has never used smokeless tobacco  He reports previous alcohol use  He reports that he does not use drugs  Current Outpatient Medications   Medication Sig Dispense Refill    amLODIPine (NORVASC) 2 5 mg tablet TAKE 1 TABLET BY MOUTH EVERY DAY 90 tablet 0    B-D UF III MINI PEN NEEDLES 31G X 5 MM MISC USE AS DIRECTED 50 each 0    CHUYITA MICROLET LANCETS lancets by Does not apply route      glucose blood (CHUYITA CONTOUR TEST) test strip by In Vitro route      insulin glargine (Lantus SoloStar) 100 units/mL injection pen Inject 68 Units under the skin daily 45 mL 3    Insulin Pen Needle 31G X 5 MM MISC BD Ultra-Fine Mini Pen Needle 31 gauge x 3/16"      lisinopril (ZESTRIL) 40 mg tablet TAKE 1 TABLET BY MOUTH EVERY DAY 10 tablet 0    oxymetazoline (AFRIN) 0 05 % nasal spray 2 sprays by Each Nare route daily administer prior to HBO treatments      rosuvastatin (CRESTOR) 20 MG tablet TAKE 1 TABLET BY MOUTH EVERY DAY 90 tablet 0    sildenafil (VIAGRA) 100 mg tablet Take by mouth       No current facility-administered medications for this visit        He is allergic to bee venom and penicillins       Review of Systems   Constitutional: Negative for activity change, appetite change, chills, diaphoresis, fatigue, fever and unexpected weight change  HENT: Negative for congestion, dental problem, ear pain, mouth sores, sinus pressure, sinus pain, sore throat and trouble swallowing  Eyes: Negative for photophobia, discharge and itching  Respiratory: Negative for apnea, chest tightness and shortness of breath  Cardiovascular: Negative for chest pain, palpitations and leg swelling  Gastrointestinal: Negative for abdominal distention, abdominal pain, blood in stool, nausea and vomiting  Endocrine: Negative for cold intolerance, heat intolerance, polydipsia, polyphagia and polyuria  Genitourinary: Negative for difficulty urinating  Musculoskeletal: Negative for arthralgias  Skin: Positive for wound  Negative for color change  Neurological: Negative for dizziness, syncope, speech difficulty and headaches  Hematological: Negative for adenopathy  Psychiatric/Behavioral: Negative for agitation and behavioral problems  Objective:       Wound 04/07/21 Diabetic Ulcer Leg Left; Lower (Active)   Wound Image Images linked 04/28/21 0935   Wound Description Granulation tissue;Pink 04/28/21 0933   Luz Elena-wound Assessment Dry;Callus 04/28/21 0933   Wound Length (cm) 0 9 cm 04/28/21 0933   Wound Width (cm) 0 1 cm 04/28/21 0933   Wound Depth (cm) 1 cm 04/28/21 0933   Wound Surface Area (cm^2) 0 09 cm^2 04/28/21 0933   Wound Volume (cm^3) 0 09 cm^3 04/28/21 0933   Calculated Wound Volume (cm^3) 0 09 cm^3 04/28/21 0933   Change in Wound Size % 43 75 04/28/21 0933   Undermining 0 3 04/28/21 0933   Undermining is depth extending from 12-12 04/28/21 0933   Drainage Amount None 04/28/21 0933   Non-staged Wound Description Full thickness 04/28/21 0933   Dressing Status Other (Comment) (no dressing upon arrival) 04/28/21 0933       Wound 04/28/21 Diabetic Ulcer Knee Left;Medial (Active)   Wound Image Images linked 04/28/21 0939   Wound Description Granulation tissue;Slough; Yellow; Beefy red 04/28/21 0938   Luz Elena-wound Assessment Erythema 04/28/21 0938   Wound Length (cm) 0 5 cm 04/28/21 0938   Wound Width (cm) 0 9 cm 04/28/21 0938   Wound Depth (cm) 0 1 cm 04/28/21 0938   Wound Surface Area (cm^2) 0 45 cm^2 04/28/21 0938   Wound Volume (cm^3) 0 05 cm^3 04/28/21 0938   Calculated Wound Volume (cm^3) 0 05 cm^3 04/28/21 0938   Drainage Amount Scant 04/28/21 0938   Drainage Description Serous 04/28/21 0938   Non-staged Wound Description Full thickness 04/28/21 0938   Dressing Status Other (Comment) (no dressing) 04/28/21 0938       BP (!) 191/94   Pulse 73   Temp (!) 96 9 °F (36 1 °C)   Resp 16     Physical Exam  Vitals signs and nursing note reviewed  Constitutional:       General: He is not in acute distress  Appearance: He is well-developed  He is not ill-appearing, toxic-appearing or diaphoretic  HENT:      Head: Normocephalic and atraumatic  Nose: Nose normal    Eyes:      General:         Right eye: No discharge  Left eye: No discharge  Conjunctiva/sclera: Conjunctivae normal    Neck:      Musculoskeletal: Normal range of motion  Pulmonary:      Effort: Pulmonary effort is normal  No respiratory distress  Skin:     Comments: See wound analysis   Neurological:      Mental Status: He is alert  Wound Instructions:  Orders Placed This Encounter   Procedures    Wound cleansing and dressings     Left stump wound  Wash your hands with soap and water  Remove old dressing, discard into plastic bag and place in trash  Cleanse the wound with soap and water prior to applying a clean dressing  Do not use tissue or cotton balls  Do not scrub the wound  Pat dry using gauze  Shower yes   Apply skin prep to skin surrounding wound  Apply Puracol AG to the wound    Cover with 2X2 hydrocolloid  Change dressing every 3 days  This was done  today                 Standing Status:   Future     Standing Expiration Date:   4/28/2022    Wound off loading     Off-loading Instructions:     Keep weight and pressure off wound at all times  Wear prosthesis minimally  Standing Status:   Future     Standing Expiration Date:   4/28/2022    Wound cleansing and dressings     Left medial knee wound  Wash your hands with soap and water  Remove old dressing, discard into plastic bag and place in trash  Cleanse the wound with soap and water prior to applying a clean dressing  Do not use tissue or cotton balls  Do not scrub the wound  Pat dry using gauze  Shower yes   Apply Allevyn lite to the wound  Change dressing every 3 days  This was done today     Standing Status:   Future     Standing Expiration Date:   4/28/2022    Debridement     This order was created via procedure documentation    Debridement     This order was created via procedure documentation        Diagnosis ICD-10-CM Associated Orders   1  Diabetic ulcer of left lower leg (HCC)  E11 622 lidocaine (XYLOCAINE) 4 % topical solution 5 mL    L97 929 Wound cleansing and dressings     Wound off loading     Wound cleansing and dressings     Debridement     Debridement   2   Unilateral complete below-knee amputation, sequela ([address] )  S88 [id number]

## 2021-04-28 NOTE — PATIENT INSTRUCTIONS
Orders Placed This Encounter   Procedures    Wound cleansing and dressings     Left stump wound  Wash your hands with soap and water  Remove old dressing, discard into plastic bag and place in trash  Cleanse the wound with soap and water prior to applying a clean dressing  Do not use tissue or cotton balls  Do not scrub the wound  Pat dry using gauze  Shower yes   Apply skin prep to skin surrounding wound  Apply Puracol AG to the wound  Cover with 2X2 hydrocolloid  Change dressing every 3 days  This was done  today                           Standing Status:   Future     Standing Expiration Date:   4/28/2022    Wound off loading     Off-loading Instructions:     Keep weight and pressure off wound at all times  Wear prosthesis minimally  Standing Status:   Future     Standing Expiration Date:   4/28/2022    Wound cleansing and dressings     Left medial knee wound  Wash your hands with soap and water  Remove old dressing, discard into plastic bag and place in trash  Cleanse the wound with soap and water prior to applying a clean dressing  Do not use tissue or cotton balls  Do not scrub the wound  Pat dry using gauze  Shower yes   Apply Allevyn lite to the wound      Change dressing every 3 days  This was done today     Standing Status:   Future     Standing Expiration Date:   4/28/2022

## 2021-06-06 DIAGNOSIS — I10 BENIGN ESSENTIAL HYPERTENSION: ICD-10-CM

## 2021-06-07 RX ORDER — LISINOPRIL 40 MG/1
TABLET ORAL
Qty: 90 TABLET | Refills: 1 | Status: SHIPPED | OUTPATIENT
Start: 2021-06-07 | End: 2021-06-09 | Stop reason: SDUPTHER

## 2021-06-09 DIAGNOSIS — I10 BENIGN ESSENTIAL HYPERTENSION: ICD-10-CM

## 2021-06-09 RX ORDER — LISINOPRIL 40 MG/1
40 TABLET ORAL DAILY
Qty: 90 TABLET | Refills: 1 | Status: SHIPPED | OUTPATIENT
Start: 2021-06-09 | End: 2022-02-23 | Stop reason: HOSPADM

## 2021-06-12 DIAGNOSIS — I10 BENIGN ESSENTIAL HYPERTENSION: ICD-10-CM

## 2021-06-12 DIAGNOSIS — E78.5 HYPERLIPIDEMIA, UNSPECIFIED HYPERLIPIDEMIA TYPE: ICD-10-CM

## 2021-06-14 RX ORDER — ROSUVASTATIN CALCIUM 20 MG/1
TABLET, COATED ORAL
Qty: 90 TABLET | Refills: 1 | Status: SHIPPED | OUTPATIENT
Start: 2021-06-14 | End: 2021-12-06

## 2021-06-14 RX ORDER — AMLODIPINE BESYLATE 2.5 MG/1
TABLET ORAL
Qty: 90 TABLET | Refills: 1 | Status: SHIPPED | OUTPATIENT
Start: 2021-06-14 | End: 2021-12-06

## 2021-07-03 LAB
ALBUMIN SERPL-MCNC: 4 G/DL (ref 3.8–4.9)
ALBUMIN/GLOB SERPL: 1.7 {RATIO} (ref 1.2–2.2)
ALP SERPL-CCNC: 95 IU/L (ref 48–121)
ALT SERPL-CCNC: 23 IU/L (ref 0–44)
AST SERPL-CCNC: 21 IU/L (ref 0–40)
BASOPHILS # BLD AUTO: 0 X10E3/UL (ref 0–0.2)
BASOPHILS NFR BLD AUTO: 1 %
BILIRUB SERPL-MCNC: 0.3 MG/DL (ref 0–1.2)
BUN SERPL-MCNC: 14 MG/DL (ref 6–24)
BUN/CREAT SERPL: 18 (ref 9–20)
CALCIUM SERPL-MCNC: 8.9 MG/DL (ref 8.7–10.2)
CHLORIDE SERPL-SCNC: 105 MMOL/L (ref 96–106)
CHOLEST SERPL-MCNC: 179 MG/DL (ref 100–199)
CHOLEST/HDLC SERPL: 3.6 RATIO (ref 0–5)
CO2 SERPL-SCNC: 25 MMOL/L (ref 20–29)
CREAT SERPL-MCNC: 0.79 MG/DL (ref 0.76–1.27)
EOSINOPHIL # BLD AUTO: 0.3 X10E3/UL (ref 0–0.4)
EOSINOPHIL NFR BLD AUTO: 6 %
ERYTHROCYTE [DISTWIDTH] IN BLOOD BY AUTOMATED COUNT: 12.2 % (ref 11.6–15.4)
EST. AVERAGE GLUCOSE BLD GHB EST-MCNC: 148 MG/DL
GLOBULIN SER-MCNC: 2.3 G/DL (ref 1.5–4.5)
GLUCOSE SERPL-MCNC: 110 MG/DL (ref 65–99)
HBA1C MFR BLD: 6.8 % (ref 4.8–5.6)
HCT VFR BLD AUTO: 41.9 % (ref 37.5–51)
HDLC SERPL-MCNC: 50 MG/DL
HGB BLD-MCNC: 14.1 G/DL (ref 13–17.7)
IMM GRANULOCYTES # BLD: 0 X10E3/UL (ref 0–0.1)
IMM GRANULOCYTES NFR BLD: 1 %
LDLC SERPL CALC-MCNC: 110 MG/DL (ref 0–99)
LYMPHOCYTES # BLD AUTO: 1.1 X10E3/UL (ref 0.7–3.1)
LYMPHOCYTES NFR BLD AUTO: 25 %
MCH RBC QN AUTO: 30.2 PG (ref 26.6–33)
MCHC RBC AUTO-ENTMCNC: 33.7 G/DL (ref 31.5–35.7)
MCV RBC AUTO: 90 FL (ref 79–97)
MICRODELETION SYND BLD/T FISH: NORMAL
MONOCYTES # BLD AUTO: 0.4 X10E3/UL (ref 0.1–0.9)
MONOCYTES NFR BLD AUTO: 9 %
NEUTROPHILS # BLD AUTO: 2.7 X10E3/UL (ref 1.4–7)
NEUTROPHILS NFR BLD AUTO: 58 %
PLATELET # BLD AUTO: 210 X10E3/UL (ref 150–450)
POTASSIUM SERPL-SCNC: 5 MMOL/L (ref 3.5–5.2)
PROT SERPL-MCNC: 6.3 G/DL (ref 6–8.5)
RBC # BLD AUTO: 4.67 X10E6/UL (ref 4.14–5.8)
SL AMB EGFR AFRICAN AMERICAN: 114 ML/MIN/1.73
SL AMB EGFR NON AFRICAN AMERICAN: 99 ML/MIN/1.73
SL AMB VLDL CHOLESTEROL CALC: 19 MG/DL (ref 5–40)
SODIUM SERPL-SCNC: 142 MMOL/L (ref 134–144)
TRIGL SERPL-MCNC: 103 MG/DL (ref 0–149)
WBC # BLD AUTO: 4.5 X10E3/UL (ref 3.4–10.8)

## 2021-07-09 ENCOUNTER — TELEPHONE (OUTPATIENT)
Dept: FAMILY MEDICINE CLINIC | Facility: CLINIC | Age: 56
End: 2021-07-09

## 2021-07-09 NOTE — TELEPHONE ENCOUNTER
Pt needs his prosthetic supplies   Pls advise    [facility]    Fax- [phone]  Phone- [phone]

## 2021-09-10 ENCOUNTER — APPOINTMENT (OUTPATIENT)
Dept: RADIOLOGY | Age: 56
End: 2021-09-10

## 2021-09-10 ENCOUNTER — APPOINTMENT (OUTPATIENT)
Dept: LAB | Age: 56
End: 2021-09-10

## 2021-09-10 DIAGNOSIS — Z00.00 ANNUAL PHYSICAL EXAM: ICD-10-CM

## 2021-09-10 LAB
BASOPHILS # BLD AUTO: 0.03 THOUSANDS/ΜL (ref 0–0.1)
BASOPHILS NFR BLD AUTO: 1 % (ref 0–1)
BUN SERPL-MCNC: 18 MG/DL (ref 5–25)
CREAT SERPL-MCNC: 0.74 MG/DL (ref 0.6–1.3)
EOSINOPHIL # BLD AUTO: 0.2 THOUSAND/ΜL (ref 0–0.61)
EOSINOPHIL NFR BLD AUTO: 4 % (ref 0–6)
ERYTHROCYTE [DISTWIDTH] IN BLOOD BY AUTOMATED COUNT: 13.7 % (ref 11.6–15.1)
GFR SERPL CREATININE-BSD FRML MDRD: 102 ML/MIN/1.73SQ M
HCT VFR BLD AUTO: 37.1 % (ref 36.5–49.3)
HGB BLD-MCNC: 11.8 G/DL (ref 12–17)
IMM GRANULOCYTES # BLD AUTO: 0.02 THOUSAND/UL (ref 0–0.2)
IMM GRANULOCYTES NFR BLD AUTO: 0 % (ref 0–2)
LYMPHOCYTES # BLD AUTO: 1.05 THOUSANDS/ΜL (ref 0.6–4.47)
LYMPHOCYTES NFR BLD AUTO: 23 % (ref 14–44)
MCH RBC QN AUTO: 30.3 PG (ref 26.8–34.3)
MCHC RBC AUTO-ENTMCNC: 31.8 G/DL (ref 31.4–37.4)
MCV RBC AUTO: 95 FL (ref 82–98)
MONOCYTES # BLD AUTO: 0.57 THOUSAND/ΜL (ref 0.17–1.22)
MONOCYTES NFR BLD AUTO: 12 % (ref 4–12)
NEUTROPHILS # BLD AUTO: 2.72 THOUSANDS/ΜL (ref 1.85–7.62)
NEUTS SEG NFR BLD AUTO: 60 % (ref 43–75)
NRBC BLD AUTO-RTO: 0 /100 WBCS
PLATELET # BLD AUTO: 183 THOUSANDS/UL (ref 149–390)
PMV BLD AUTO: 10.1 FL (ref 8.9–12.7)
RBC # BLD AUTO: 3.89 MILLION/UL (ref 3.88–5.62)
WBC # BLD AUTO: 4.59 THOUSAND/UL (ref 4.31–10.16)

## 2021-09-10 PROCEDURE — 85025 COMPLETE CBC W/AUTO DIFF WBC: CPT

## 2021-09-10 PROCEDURE — 82570 ASSAY OF URINE CREATININE: CPT | Performed by: PREVENTIVE MEDICINE

## 2021-09-10 PROCEDURE — 82232 ASSAY OF BETA-2 PROTEIN: CPT | Performed by: PREVENTIVE MEDICINE

## 2021-09-10 PROCEDURE — 86870 RBC ANTIBODY IDENTIFICATION: CPT

## 2021-09-10 PROCEDURE — 82565 ASSAY OF CREATININE: CPT

## 2021-09-10 PROCEDURE — 84202 ASSAY RBC PROTOPORPHYRIN: CPT

## 2021-09-10 PROCEDURE — 83825 ASSAY OF MERCURY: CPT | Performed by: PREVENTIVE MEDICINE

## 2021-09-10 PROCEDURE — 81001 URINALYSIS AUTO W/SCOPE: CPT | Performed by: PREVENTIVE MEDICINE

## 2021-09-10 PROCEDURE — 71045 X-RAY EXAM CHEST 1 VIEW: CPT

## 2021-09-10 PROCEDURE — 84520 ASSAY OF UREA NITROGEN: CPT

## 2021-09-10 PROCEDURE — 83655 ASSAY OF LEAD: CPT | Performed by: PREVENTIVE MEDICINE

## 2021-09-10 PROCEDURE — 82300 ASSAY OF CADMIUM: CPT

## 2021-09-10 PROCEDURE — 82175 ASSAY OF ARSENIC: CPT | Performed by: PREVENTIVE MEDICINE

## 2021-09-14 LAB
CADMIUM BLD-MCNC: <0.5 UG/L (ref 0–1.2)
LEAD BLD-MCNC: 2 UG/DL (ref 0–4)
ZPP RBC-MCNC: 27 UG/DL (ref 0–99)

## 2021-11-18 ENCOUNTER — OFFICE VISIT (OUTPATIENT)
Dept: FAMILY MEDICINE CLINIC | Facility: CLINIC | Age: 56
End: 2021-11-18
Payer: COMMERCIAL

## 2021-11-18 VITALS
OXYGEN SATURATION: 97 % | HEIGHT: 67 IN | HEART RATE: 74 BPM | BODY MASS INDEX: 29.03 KG/M2 | SYSTOLIC BLOOD PRESSURE: 138 MMHG | RESPIRATION RATE: 16 BRPM | TEMPERATURE: 97.6 F | DIASTOLIC BLOOD PRESSURE: 80 MMHG | WEIGHT: 185 LBS

## 2021-11-18 DIAGNOSIS — Z23 NEED FOR VACCINATION: ICD-10-CM

## 2021-11-18 DIAGNOSIS — Z12.5 PROSTATE CANCER SCREENING: ICD-10-CM

## 2021-11-18 DIAGNOSIS — E78.5 HYPERLIPIDEMIA, UNSPECIFIED HYPERLIPIDEMIA TYPE: ICD-10-CM

## 2021-11-18 DIAGNOSIS — I10 BENIGN ESSENTIAL HYPERTENSION: ICD-10-CM

## 2021-11-18 DIAGNOSIS — S88.119A AMPUTATED BELOW KNEE (HCC): ICD-10-CM

## 2021-11-18 DIAGNOSIS — E11.51 DIABETES MELLITUS WITH PERIPHERAL CIRCULATORY DISORDER, CONTROLLED (HCC): Primary | ICD-10-CM

## 2021-11-18 PROCEDURE — 90682 RIV4 VACC RECOMBINANT DNA IM: CPT

## 2021-11-18 PROCEDURE — 3075F SYST BP GE 130 - 139MM HG: CPT | Performed by: INTERNAL MEDICINE

## 2021-11-18 PROCEDURE — 3725F SCREEN DEPRESSION PERFORMED: CPT | Performed by: INTERNAL MEDICINE

## 2021-11-18 PROCEDURE — 99214 OFFICE O/P EST MOD 30 MIN: CPT | Performed by: INTERNAL MEDICINE

## 2021-11-18 PROCEDURE — 1036F TOBACCO NON-USER: CPT | Performed by: INTERNAL MEDICINE

## 2021-11-18 PROCEDURE — 3008F BODY MASS INDEX DOCD: CPT | Performed by: INTERNAL MEDICINE

## 2021-11-18 PROCEDURE — 3079F DIAST BP 80-89 MM HG: CPT | Performed by: INTERNAL MEDICINE

## 2021-11-18 PROCEDURE — 90471 IMMUNIZATION ADMIN: CPT

## 2021-12-06 DIAGNOSIS — E78.5 HYPERLIPIDEMIA, UNSPECIFIED HYPERLIPIDEMIA TYPE: ICD-10-CM

## 2021-12-06 DIAGNOSIS — I10 BENIGN ESSENTIAL HYPERTENSION: ICD-10-CM

## 2021-12-06 RX ORDER — AMLODIPINE BESYLATE 2.5 MG/1
TABLET ORAL
Qty: 90 TABLET | Refills: 1 | Status: SHIPPED | OUTPATIENT
Start: 2021-12-06 | End: 2022-02-23 | Stop reason: HOSPADM

## 2021-12-06 RX ORDER — ROSUVASTATIN CALCIUM 20 MG/1
TABLET, COATED ORAL
Qty: 90 TABLET | Refills: 1 | Status: SHIPPED | OUTPATIENT
Start: 2021-12-06 | End: 2022-02-23 | Stop reason: HOSPADM

## 2021-12-11 DIAGNOSIS — E11.51 DIABETES MELLITUS WITH PERIPHERAL CIRCULATORY DISORDER, CONTROLLED (HCC): ICD-10-CM

## 2021-12-13 RX ORDER — INSULIN GLARGINE 100 [IU]/ML
68 INJECTION, SOLUTION SUBCUTANEOUS DAILY
Qty: 15 ML | Refills: 5 | Status: SHIPPED | OUTPATIENT
Start: 2021-12-13 | End: 2022-04-13 | Stop reason: SDUPTHER

## 2021-12-15 ENCOUNTER — TELEPHONE (OUTPATIENT)
Dept: FAMILY MEDICINE CLINIC | Facility: CLINIC | Age: 56
End: 2021-12-15

## 2021-12-30 ENCOUNTER — OFFICE VISIT (OUTPATIENT)
Dept: FAMILY MEDICINE CLINIC | Facility: CLINIC | Age: 56
End: 2021-12-30
Payer: COMMERCIAL

## 2021-12-30 VITALS
HEART RATE: 76 BPM | RESPIRATION RATE: 16 BRPM | HEIGHT: 67 IN | OXYGEN SATURATION: 100 % | TEMPERATURE: 98.4 F | WEIGHT: 185 LBS | SYSTOLIC BLOOD PRESSURE: 142 MMHG | BODY MASS INDEX: 29.03 KG/M2 | DIASTOLIC BLOOD PRESSURE: 84 MMHG

## 2021-12-30 DIAGNOSIS — T87.89 PRESSURE ULCER OF BKA STUMP, STAGE 2 (HCC): Primary | ICD-10-CM

## 2021-12-30 DIAGNOSIS — L89.892 PRESSURE ULCER OF BKA STUMP, STAGE 2 (HCC): Primary | ICD-10-CM

## 2021-12-30 PROCEDURE — 99213 OFFICE O/P EST LOW 20 MIN: CPT | Performed by: INTERNAL MEDICINE

## 2021-12-30 PROCEDURE — 3008F BODY MASS INDEX DOCD: CPT | Performed by: INTERNAL MEDICINE

## 2022-02-23 ENCOUNTER — APPOINTMENT (INPATIENT)
Dept: NON INVASIVE DIAGNOSTICS | Facility: HOSPITAL | Age: 57
DRG: 291 | End: 2022-02-23
Payer: COMMERCIAL

## 2022-02-23 ENCOUNTER — PREP FOR PROCEDURE (OUTPATIENT)
Dept: NON INVASIVE DIAGNOSTICS | Facility: HOSPITAL | Age: 57
End: 2022-02-23

## 2022-02-23 ENCOUNTER — APPOINTMENT (EMERGENCY)
Dept: RADIOLOGY | Facility: HOSPITAL | Age: 57
DRG: 291 | End: 2022-02-23
Payer: COMMERCIAL

## 2022-02-23 ENCOUNTER — HOSPITAL ENCOUNTER (INPATIENT)
Facility: HOSPITAL | Age: 57
LOS: 1 days | Discharge: ANOTHER HOSPITAL | DRG: 291 | End: 2022-02-23
Attending: EMERGENCY MEDICINE | Admitting: INTERNAL MEDICINE
Payer: COMMERCIAL

## 2022-02-23 VITALS
HEART RATE: 62 BPM | BODY MASS INDEX: 27.62 KG/M2 | RESPIRATION RATE: 18 BRPM | HEIGHT: 67 IN | SYSTOLIC BLOOD PRESSURE: 113 MMHG | WEIGHT: 176 LBS | TEMPERATURE: 97.3 F | OXYGEN SATURATION: 96 % | DIASTOLIC BLOOD PRESSURE: 62 MMHG

## 2022-02-23 DIAGNOSIS — J90 PLEURAL EFFUSION: ICD-10-CM

## 2022-02-23 DIAGNOSIS — R06.02 SOB (SHORTNESS OF BREATH): ICD-10-CM

## 2022-02-23 DIAGNOSIS — I50.9 CHF (CONGESTIVE HEART FAILURE) (HCC): ICD-10-CM

## 2022-02-23 DIAGNOSIS — E78.5 HYPERLIPIDEMIA, UNSPECIFIED HYPERLIPIDEMIA TYPE: Chronic | ICD-10-CM

## 2022-02-23 DIAGNOSIS — R06.02 SHORTNESS OF BREATH: ICD-10-CM

## 2022-02-23 DIAGNOSIS — R77.8 ELEVATED TROPONIN: Primary | ICD-10-CM

## 2022-02-23 DIAGNOSIS — E11.51 DIABETES MELLITUS WITH PERIPHERAL CIRCULATORY DISORDER, CONTROLLED (HCC): ICD-10-CM

## 2022-02-23 DIAGNOSIS — I10 BENIGN ESSENTIAL HYPERTENSION: Chronic | ICD-10-CM

## 2022-02-23 DIAGNOSIS — I42.9 CARDIOMYOPATHY, UNSPECIFIED TYPE (HCC): ICD-10-CM

## 2022-02-23 PROBLEM — Z79.4 TYPE 2 DIABETES MELLITUS, WITH LONG-TERM CURRENT USE OF INSULIN (HCC): Status: ACTIVE | Noted: 2022-02-23

## 2022-02-23 PROBLEM — E11.9 TYPE 2 DIABETES MELLITUS, WITH LONG-TERM CURRENT USE OF INSULIN (HCC): Status: ACTIVE | Noted: 2022-02-23

## 2022-02-23 PROBLEM — I50.23 ACUTE ON CHRONIC SYSTOLIC CONGESTIVE HEART FAILURE (HCC): Status: ACTIVE | Noted: 2022-02-23

## 2022-02-23 PROBLEM — J96.22 ACUTE ON CHRONIC RESPIRATORY FAILURE WITH HYPOXIA AND HYPERCAPNIA (HCC): Status: ACTIVE | Noted: 2022-02-23

## 2022-02-23 PROBLEM — S88.119A UNILATERAL COMPLETE BKA (HCC): Chronic | Status: ACTIVE | Noted: 2020-08-19

## 2022-02-23 PROBLEM — K21.9 GASTROESOPHAGEAL REFLUX DISEASE WITHOUT ESOPHAGITIS: Chronic | Status: ACTIVE | Noted: 2018-07-12

## 2022-02-23 PROBLEM — R79.89 ELEVATED TROPONIN: Status: ACTIVE | Noted: 2022-02-23

## 2022-02-23 PROBLEM — J96.21 ACUTE ON CHRONIC RESPIRATORY FAILURE WITH HYPOXIA AND HYPERCAPNIA (HCC): Status: ACTIVE | Noted: 2022-02-23

## 2022-02-23 LAB
2HR DELTA HS TROPONIN: 380 NG/L
ALBUMIN SERPL BCP-MCNC: 2.8 G/DL (ref 3.5–5)
ALP SERPL-CCNC: 99 U/L (ref 46–116)
ALT SERPL W P-5'-P-CCNC: 32 U/L (ref 12–78)
ANION GAP SERPL CALCULATED.3IONS-SCNC: 6 MMOL/L (ref 4–13)
AORTIC ROOT: 3.6 CM
AORTIC VALVE MEAN VELOCITY: 17.9 M/S
APICAL FOUR CHAMBER EJECTION FRACTION: 34 %
APPEARANCE FLD: NORMAL
APTT PPP: 28 SECONDS (ref 23–37)
APTT PPP: 57 SECONDS (ref 23–37)
ARTERIAL PATENCY WRIST A: YES
AST SERPL W P-5'-P-CCNC: 22 U/L (ref 5–45)
AV AREA BY CONTINUOUS VTI: 1.5 CM2
AV AREA PEAK VELOCITY: 1.5 CM2
AV LVOT MEAN GRADIENT: 3 MMHG
AV LVOT PEAK GRADIENT: 6 MMHG
AV MEAN GRADIENT: 14 MMHG
AV PEAK GRADIENT: 29 MMHG
AV VALVE AREA: 1.49 CM2
AV VELOCITY RATIO: 0.46
BASE EX.OXY STD BLDV CALC-SCNC: 37 % (ref 60–80)
BASE EX.OXY STD BLDV CALC-SCNC: 39.1 % (ref 60–80)
BASE EXCESS BLDA CALC-SCNC: 2.5 MMOL/L
BASE EXCESS BLDV CALC-SCNC: -3.9 MMOL/L
BASE EXCESS BLDV CALC-SCNC: 0.4 MMOL/L
BASOPHILS # BLD AUTO: 0.06 THOUSANDS/ΜL (ref 0–0.1)
BASOPHILS NFR BLD AUTO: 1 % (ref 0–1)
BILIRUB SERPL-MCNC: 0.38 MG/DL (ref 0.2–1)
BODY TEMPERATURE: 98 DEGREES FEHRENHEIT
BUN SERPL-MCNC: 14 MG/DL (ref 5–25)
CALCIUM ALBUM COR SERPL-MCNC: 9.1 MG/DL (ref 8.3–10.1)
CALCIUM SERPL-MCNC: 8.1 MG/DL (ref 8.3–10.1)
CARDIAC TROPONIN I PNL SERPL HS: 42 NG/L
CARDIAC TROPONIN I PNL SERPL HS: 422 NG/L
CHLORIDE SERPL-SCNC: 107 MMOL/L (ref 100–108)
CHOLEST SERPL-MCNC: 134 MG/DL
CO2 SERPL-SCNC: 31 MMOL/L (ref 21–32)
COLOR FLD: NORMAL
CREAT SERPL-MCNC: 0.92 MG/DL (ref 0.6–1.3)
D DIMER PPP FEU-MCNC: 3.25 UG/ML FEU
DOP CALC AO PEAK VEL: 2.68 M/S
DOP CALC AO VTI: 50.29 CM
DOP CALC LVOT AREA: 3.14 CM2
DOP CALC LVOT DIAMETER: 2 CM
DOP CALC LVOT PEAK VEL VTI: 23.8 CM
DOP CALC LVOT PEAK VEL: 1.23 M/S
DOP CALC LVOT STROKE INDEX: 39.1 ML/M2
DOP CALC LVOT STROKE VOLUME: 74.73 CM3
DOP CALC MV VTI: 32.72 CM
E WAVE DECELERATION TIME: 141 MS
EOSINOPHIL # BLD AUTO: 0.27 THOUSAND/ΜL (ref 0–0.61)
EOSINOPHIL NFR BLD AUTO: 2 % (ref 0–6)
ERYTHROCYTE [DISTWIDTH] IN BLOOD BY AUTOMATED COUNT: 14.8 % (ref 11.6–15.1)
EST. AVERAGE GLUCOSE BLD GHB EST-MCNC: 140 MG/DL
FLUAV RNA RESP QL NAA+PROBE: NEGATIVE
FLUBV RNA RESP QL NAA+PROBE: NEGATIVE
FRACTIONAL SHORTENING: 15 % (ref 28–44)
GFR SERPL CREATININE-BSD FRML MDRD: 91 ML/MIN/1.73SQ M
GLUCOSE FLD-MCNC: 219 MG/DL
GLUCOSE SERPL-MCNC: 175 MG/DL (ref 65–140)
GLUCOSE SERPL-MCNC: 221 MG/DL (ref 65–140)
GLUCOSE SERPL-MCNC: 248 MG/DL (ref 65–140)
GLUCOSE SERPL-MCNC: 260 MG/DL (ref 65–140)
HBA1C MFR BLD: 6.5 %
HCO3 BLDA-SCNC: 28.5 MMOL/L (ref 22–28)
HCO3 BLDV-SCNC: 27.4 MMOL/L (ref 24–30)
HCO3 BLDV-SCNC: 29.7 MMOL/L (ref 24–30)
HCT VFR BLD AUTO: 42.6 % (ref 36.5–49.3)
HDLC SERPL-MCNC: 49 MG/DL
HGB BLD-MCNC: 12.4 G/DL (ref 12–17)
HISTIOCYTES NFR FLD: 47 %
IMM GRANULOCYTES # BLD AUTO: 0.05 THOUSAND/UL (ref 0–0.2)
IMM GRANULOCYTES NFR BLD AUTO: 0 % (ref 0–2)
INR PPP: 1.11 (ref 0.84–1.19)
INTERVENTRICULAR SEPTUM IN DIASTOLE (PARASTERNAL SHORT AXIS VIEW): 1.2 CM (ref 0.53–0.99)
INTERVENTRICULAR SEPTUM: 1.2 CM (ref 0.6–1.1)
LAAS-AP2: 24.7 CM2
LAAS-AP4: 31 CM2
LACTATE SERPL-SCNC: 2 MMOL/L (ref 0.5–2)
LDH FLD L TO P-CCNC: 116 U/L
LDLC SERPL CALC-MCNC: 75 MG/DL (ref 0–100)
LEFT ATRIUM AREA SYSTOLE SINGLE PLANE A4C: 28.7 CM2
LEFT ATRIUM SIZE: 3.9 CM
LEFT INTERNAL DIMENSION IN SYSTOLE: 4.5 CM (ref 2.8–4.24)
LEFT VENTRICULAR INTERNAL DIMENSION IN DIASTOLE: 5.3 CM (ref 4.6–6.85)
LEFT VENTRICULAR POSTERIOR WALL IN END DIASTOLE: 1.2 CM (ref 0.52–0.98)
LEFT VENTRICULAR STROKE VOLUME: 44 ML
LVSV (TEICH): 44 ML
LYMPHOCYTES # BLD AUTO: 1.24 THOUSANDS/ΜL (ref 0.6–4.47)
LYMPHOCYTES NFR BLD AUTO: 10 % (ref 14–44)
LYMPHOCYTES NFR BLD AUTO: 44 %
MCH RBC QN AUTO: 29 PG (ref 26.8–34.3)
MCHC RBC AUTO-ENTMCNC: 29.1 G/DL (ref 31.4–37.4)
MCV RBC AUTO: 100 FL (ref 82–98)
MONO+MESO NFR FLD MANUAL: 5 %
MONOCYTES # BLD AUTO: 0.77 THOUSAND/ΜL (ref 0.17–1.22)
MONOCYTES NFR BLD AUTO: 2 %
MONOCYTES NFR BLD AUTO: 6 % (ref 4–12)
MV E'TISSUE VEL-LAT: 19 CM/S
MV E'TISSUE VEL-SEP: 18 CM/S
MV MEAN GRADIENT: 3 MMHG
MV PEAK A VEL: 0.45 M/S
MV PEAK E VEL: 111 CM/S
MV PEAK GRADIENT: 6 MMHG
MV STENOSIS PRESSURE HALF TIME: 41 MS
MV VALVE AREA BY CONTINUITY EQUATION: 2.28 CM2
MV VALVE AREA P 1/2 METHOD: 5.37 CM2
NASAL CANNULA: 2
NEUTROPHILS # BLD AUTO: 10.32 THOUSANDS/ΜL (ref 1.85–7.62)
NEUTS SEG NFR BLD AUTO: 2 %
NEUTS SEG NFR BLD AUTO: 81 % (ref 43–75)
NRBC BLD AUTO-RTO: 0 /100 WBCS
NT-PROBNP SERPL-MCNC: 3146 PG/ML
O2 CT BLDA-SCNC: 15.7 ML/DL (ref 16–23)
O2 CT BLDV-SCNC: 6.7 ML/DL
O2 CT BLDV-SCNC: 6.9 ML/DL
OXYHGB MFR BLDA: 91.6 % (ref 94–97)
PCO2 BLDA: 50.3 MM HG (ref 36–44)
PCO2 BLDV: 73 MM HG (ref 42–50)
PCO2 BLDV: 86.8 MM HG (ref 42–50)
PCO2 TEMP ADJ BLDA: 49.6 MM HG (ref 36–44)
PH BLD: 7.38 [PH] (ref 7.35–7.45)
PH BLDA: 7.37 [PH] (ref 7.35–7.45)
PH BLDV: 7.12 [PH] (ref 7.3–7.4)
PH BLDV: 7.23 [PH] (ref 7.3–7.4)
PH BODY FLUID: 7.5
PLATELET # BLD AUTO: 310 THOUSANDS/UL (ref 149–390)
PMV BLD AUTO: 10.1 FL (ref 8.9–12.7)
PO2 BLD: 62 MM HG (ref 75–129)
PO2 BLDA: 63.3 MM HG (ref 75–129)
PO2 BLDV: 27.8 MM HG (ref 35–45)
PO2 BLDV: 29.1 MM HG (ref 35–45)
POTASSIUM SERPL-SCNC: 3.9 MMOL/L (ref 3.5–5.3)
PROCALCITONIN SERPL-MCNC: 0.13 NG/ML
PROCALCITONIN SERPL-MCNC: <0.05 NG/ML
PROT FLD-MCNC: 2.3 G/DL
PROT SERPL-MCNC: 7.1 G/DL (ref 6.4–8.2)
PROTHROMBIN TIME: 14.1 SECONDS (ref 11.6–14.5)
PV PEAK GRADIENT: 3 MMHG
RBC # BLD AUTO: 4.28 MILLION/UL (ref 3.88–5.62)
RIGHT ATRIUM AREA SYSTOLE A4C: 15.7 CM2
RIGHT VENTRICLE ID DIMENSION: 3.8 CM
RSV RNA RESP QL NAA+PROBE: NEGATIVE
SARS-COV-2 RNA RESP QL NAA+PROBE: NEGATIVE
SITE: NORMAL
SL CV LEFT ATRIUM LENGTH A2C: 6 CM
SL CV PED ECHO LEFT VENTRICLE DIASTOLIC VOLUME (MOD BIPLANE) 2D: 138 ML
SL CV PED ECHO LEFT VENTRICLE SYSTOLIC VOLUME (MOD BIPLANE) 2D: 94 ML
SODIUM SERPL-SCNC: 144 MMOL/L (ref 136–145)
SPECIMEN SOURCE: ABNORMAL
TOTAL CELLS COUNTED SPEC: 100
TRIGL SERPL-MCNC: 51 MG/DL
WBC # BLD AUTO: 12.71 THOUSAND/UL (ref 4.31–10.16)
WBC # FLD MANUAL: 564 /UL
Z-SCORE OF INTERVENTRICULAR SEPTUM IN END DIASTOLE: 3.74
Z-SCORE OF LEFT VENTRICULAR DIMENSION IN END DIASTOLE: -0.57
Z-SCORE OF LEFT VENTRICULAR DIMENSION IN END SYSTOLE: 2.1
Z-SCORE OF LEFT VENTRICULAR POSTERIOR WALL IN END DIASTOLE: 3.85

## 2022-02-23 PROCEDURE — 82945 GLUCOSE OTHER FLUID: CPT | Performed by: NURSE PRACTITIONER

## 2022-02-23 PROCEDURE — 99223 1ST HOSP IP/OBS HIGH 75: CPT | Performed by: INTERNAL MEDICINE

## 2022-02-23 PROCEDURE — 82948 REAGENT STRIP/BLOOD GLUCOSE: CPT

## 2022-02-23 PROCEDURE — NC001 PR NO CHARGE: Performed by: STUDENT IN AN ORGANIZED HEALTH CARE EDUCATION/TRAINING PROGRAM

## 2022-02-23 PROCEDURE — 88305 TISSUE EXAM BY PATHOLOGIST: CPT | Performed by: PATHOLOGY

## 2022-02-23 PROCEDURE — 83986 ASSAY PH BODY FLUID NOS: CPT | Performed by: NURSE PRACTITIONER

## 2022-02-23 PROCEDURE — 94760 N-INVAS EAR/PLS OXIMETRY 1: CPT

## 2022-02-23 PROCEDURE — 88341 IMHCHEM/IMCYTCHM EA ADD ANTB: CPT | Performed by: PATHOLOGY

## 2022-02-23 PROCEDURE — 88342 IMHCHEM/IMCYTCHM 1ST ANTB: CPT | Performed by: PATHOLOGY

## 2022-02-23 PROCEDURE — 87205 SMEAR GRAM STAIN: CPT | Performed by: NURSE PRACTITIONER

## 2022-02-23 PROCEDURE — 96374 THER/PROPH/DIAG INJ IV PUSH: CPT

## 2022-02-23 PROCEDURE — 93005 ELECTROCARDIOGRAM TRACING: CPT

## 2022-02-23 PROCEDURE — 99222 1ST HOSP IP/OBS MODERATE 55: CPT | Performed by: INTERNAL MEDICINE

## 2022-02-23 PROCEDURE — G1004 CDSM NDSC: HCPCS

## 2022-02-23 PROCEDURE — 94002 VENT MGMT INPAT INIT DAY: CPT

## 2022-02-23 PROCEDURE — C8929 TTE W OR WO FOL WCON,DOPPLER: HCPCS

## 2022-02-23 PROCEDURE — 85379 FIBRIN DEGRADATION QUANT: CPT | Performed by: EMERGENCY MEDICINE

## 2022-02-23 PROCEDURE — 82805 BLOOD GASES W/O2 SATURATION: CPT | Performed by: INTERNAL MEDICINE

## 2022-02-23 PROCEDURE — 0W9B3ZZ DRAINAGE OF LEFT PLEURAL CAVITY, PERCUTANEOUS APPROACH: ICD-10-PCS | Performed by: STUDENT IN AN ORGANIZED HEALTH CARE EDUCATION/TRAINING PROGRAM

## 2022-02-23 PROCEDURE — 85025 COMPLETE CBC W/AUTO DIFF WBC: CPT | Performed by: EMERGENCY MEDICINE

## 2022-02-23 PROCEDURE — 87070 CULTURE OTHR SPECIMN AEROBIC: CPT | Performed by: NURSE PRACTITIONER

## 2022-02-23 PROCEDURE — 85730 THROMBOPLASTIN TIME PARTIAL: CPT | Performed by: INTERNAL MEDICINE

## 2022-02-23 PROCEDURE — 88112 CYTOPATH CELL ENHANCE TECH: CPT | Performed by: PATHOLOGY

## 2022-02-23 PROCEDURE — 84145 PROCALCITONIN (PCT): CPT | Performed by: EMERGENCY MEDICINE

## 2022-02-23 PROCEDURE — 82805 BLOOD GASES W/O2 SATURATION: CPT | Performed by: EMERGENCY MEDICINE

## 2022-02-23 PROCEDURE — 83880 ASSAY OF NATRIURETIC PEPTIDE: CPT | Performed by: EMERGENCY MEDICINE

## 2022-02-23 PROCEDURE — 80061 LIPID PANEL: CPT | Performed by: INTERNAL MEDICINE

## 2022-02-23 PROCEDURE — 0W993ZZ DRAINAGE OF RIGHT PLEURAL CAVITY, PERCUTANEOUS APPROACH: ICD-10-PCS | Performed by: STUDENT IN AN ORGANIZED HEALTH CARE EDUCATION/TRAINING PROGRAM

## 2022-02-23 PROCEDURE — 36600 WITHDRAWAL OF ARTERIAL BLOOD: CPT

## 2022-02-23 PROCEDURE — 83036 HEMOGLOBIN GLYCOSYLATED A1C: CPT | Performed by: INTERNAL MEDICINE

## 2022-02-23 PROCEDURE — 85610 PROTHROMBIN TIME: CPT | Performed by: EMERGENCY MEDICINE

## 2022-02-23 PROCEDURE — 83605 ASSAY OF LACTIC ACID: CPT | Performed by: EMERGENCY MEDICINE

## 2022-02-23 PROCEDURE — 84157 ASSAY OF PROTEIN OTHER: CPT | Performed by: NURSE PRACTITIONER

## 2022-02-23 PROCEDURE — 0241U HB NFCT DS VIR RESP RNA 4 TRGT: CPT | Performed by: EMERGENCY MEDICINE

## 2022-02-23 PROCEDURE — 99285 EMERGENCY DEPT VISIT HI MDM: CPT

## 2022-02-23 PROCEDURE — 36415 COLL VENOUS BLD VENIPUNCTURE: CPT | Performed by: EMERGENCY MEDICINE

## 2022-02-23 PROCEDURE — 80053 COMPREHEN METABOLIC PANEL: CPT | Performed by: EMERGENCY MEDICINE

## 2022-02-23 PROCEDURE — 85730 THROMBOPLASTIN TIME PARTIAL: CPT | Performed by: EMERGENCY MEDICINE

## 2022-02-23 PROCEDURE — 96365 THER/PROPH/DIAG IV INF INIT: CPT

## 2022-02-23 PROCEDURE — 99291 CRITICAL CARE FIRST HOUR: CPT | Performed by: EMERGENCY MEDICINE

## 2022-02-23 PROCEDURE — 71275 CT ANGIOGRAPHY CHEST: CPT

## 2022-02-23 PROCEDURE — 99239 HOSP IP/OBS DSCHRG MGMT >30: CPT | Performed by: INTERNAL MEDICINE

## 2022-02-23 PROCEDURE — 93306 TTE W/DOPPLER COMPLETE: CPT | Performed by: INTERNAL MEDICINE

## 2022-02-23 PROCEDURE — 87040 BLOOD CULTURE FOR BACTERIA: CPT | Performed by: EMERGENCY MEDICINE

## 2022-02-23 PROCEDURE — 89051 BODY FLUID CELL COUNT: CPT | Performed by: NURSE PRACTITIONER

## 2022-02-23 PROCEDURE — 32555 ASPIRATE PLEURA W/ IMAGING: CPT | Performed by: STUDENT IN AN ORGANIZED HEALTH CARE EDUCATION/TRAINING PROGRAM

## 2022-02-23 PROCEDURE — 32555 ASPIRATE PLEURA W/ IMAGING: CPT

## 2022-02-23 PROCEDURE — 83615 LACTATE (LD) (LDH) ENZYME: CPT | Performed by: NURSE PRACTITIONER

## 2022-02-23 PROCEDURE — 84484 ASSAY OF TROPONIN QUANT: CPT | Performed by: EMERGENCY MEDICINE

## 2022-02-23 PROCEDURE — 94640 AIRWAY INHALATION TREATMENT: CPT

## 2022-02-23 RX ORDER — CEFTRIAXONE 2 G/50ML
2000 INJECTION, SOLUTION INTRAVENOUS ONCE
Status: COMPLETED | OUTPATIENT
Start: 2022-02-23 | End: 2022-02-23

## 2022-02-23 RX ORDER — ASPIRIN 81 MG/1
81 TABLET ORAL DAILY
Status: DISCONTINUED | OUTPATIENT
Start: 2022-02-24 | End: 2022-02-24 | Stop reason: HOSPADM

## 2022-02-23 RX ORDER — HEPARIN SODIUM 1000 [USP'U]/ML
2000 INJECTION, SOLUTION INTRAVENOUS; SUBCUTANEOUS
Status: DISCONTINUED | OUTPATIENT
Start: 2022-02-23 | End: 2022-02-24 | Stop reason: HOSPADM

## 2022-02-23 RX ORDER — LISINOPRIL 20 MG/1
40 TABLET ORAL DAILY
Status: DISCONTINUED | OUTPATIENT
Start: 2022-02-23 | End: 2022-02-23

## 2022-02-23 RX ORDER — LIDOCAINE WITH 8.4% SOD BICARB 0.9%(10ML)
SYRINGE (ML) INJECTION CODE/TRAUMA/SEDATION MEDICATION
Status: COMPLETED | OUTPATIENT
Start: 2022-02-23 | End: 2022-02-23

## 2022-02-23 RX ORDER — HEPARIN SODIUM 10000 [USP'U]/100ML
3-20 INJECTION, SOLUTION INTRAVENOUS
Status: DISCONTINUED | OUTPATIENT
Start: 2022-02-23 | End: 2022-02-24 | Stop reason: HOSPADM

## 2022-02-23 RX ORDER — FUROSEMIDE 10 MG/ML
40 INJECTION INTRAMUSCULAR; INTRAVENOUS ONCE
Status: COMPLETED | OUTPATIENT
Start: 2022-02-23 | End: 2022-02-23

## 2022-02-23 RX ORDER — FUROSEMIDE 10 MG/ML
40 INJECTION INTRAMUSCULAR; INTRAVENOUS 2 TIMES DAILY
Qty: 4 ML | Refills: 0
Start: 2022-02-23 | End: 2022-04-28

## 2022-02-23 RX ORDER — AMLODIPINE BESYLATE 2.5 MG/1
2.5 TABLET ORAL DAILY
Status: DISCONTINUED | OUTPATIENT
Start: 2022-02-23 | End: 2022-02-23

## 2022-02-23 RX ORDER — FUROSEMIDE 10 MG/ML
40 INJECTION INTRAMUSCULAR; INTRAVENOUS
Status: DISCONTINUED | OUTPATIENT
Start: 2022-02-23 | End: 2022-02-24 | Stop reason: HOSPADM

## 2022-02-23 RX ORDER — LISINOPRIL 20 MG/1
20 TABLET ORAL DAILY
Refills: 0
Start: 2022-02-24 | End: 2022-04-28

## 2022-02-23 RX ORDER — ASPIRIN 81 MG/1
81 TABLET ORAL DAILY
Refills: 0
Start: 2022-02-24

## 2022-02-23 RX ORDER — HEPARIN SODIUM 1000 [USP'U]/ML
4000 INJECTION, SOLUTION INTRAVENOUS; SUBCUTANEOUS ONCE
Status: COMPLETED | OUTPATIENT
Start: 2022-02-23 | End: 2022-02-23

## 2022-02-23 RX ORDER — ASPIRIN 81 MG/1
324 TABLET, CHEWABLE ORAL ONCE
Status: COMPLETED | OUTPATIENT
Start: 2022-02-23 | End: 2022-02-23

## 2022-02-23 RX ORDER — IPRATROPIUM BROMIDE AND ALBUTEROL SULFATE 2.5; .5 MG/3ML; MG/3ML
3 SOLUTION RESPIRATORY (INHALATION) EVERY 6 HOURS PRN
Refills: 0
Start: 2022-02-23 | End: 2022-04-28

## 2022-02-23 RX ORDER — ATORVASTATIN CALCIUM 80 MG/1
80 TABLET, FILM COATED ORAL
Refills: 0
Start: 2022-02-24 | End: 2022-04-28

## 2022-02-23 RX ORDER — HEPARIN SODIUM 1000 [USP'U]/ML
4000 INJECTION, SOLUTION INTRAVENOUS; SUBCUTANEOUS
Qty: 1 ML | Refills: 0
Start: 2022-02-23 | End: 2022-04-28

## 2022-02-23 RX ORDER — HEPARIN SODIUM 1000 [USP'U]/ML
4000 INJECTION, SOLUTION INTRAVENOUS; SUBCUTANEOUS
Status: DISCONTINUED | OUTPATIENT
Start: 2022-02-23 | End: 2022-02-24 | Stop reason: HOSPADM

## 2022-02-23 RX ORDER — IPRATROPIUM BROMIDE AND ALBUTEROL SULFATE 2.5; .5 MG/3ML; MG/3ML
3 SOLUTION RESPIRATORY (INHALATION)
Status: DISCONTINUED | OUTPATIENT
Start: 2022-02-23 | End: 2022-02-24 | Stop reason: HOSPADM

## 2022-02-23 RX ORDER — HEPARIN SODIUM 1000 [USP'U]/ML
2000 INJECTION, SOLUTION INTRAVENOUS; SUBCUTANEOUS
Qty: 1 ML | Refills: 0
Start: 2022-02-23 | End: 2022-04-28

## 2022-02-23 RX ORDER — LISINOPRIL 20 MG/1
20 TABLET ORAL DAILY
Status: DISCONTINUED | OUTPATIENT
Start: 2022-02-24 | End: 2022-02-24 | Stop reason: HOSPADM

## 2022-02-23 RX ORDER — INSULIN GLARGINE 100 [IU]/ML
68 INJECTION, SOLUTION SUBCUTANEOUS
Status: DISCONTINUED | OUTPATIENT
Start: 2022-02-23 | End: 2022-02-24 | Stop reason: HOSPADM

## 2022-02-23 RX ORDER — HEPARIN SODIUM 10000 [USP'U]/100ML
3-20 INJECTION, SOLUTION INTRAVENOUS
Refills: 0
Start: 2022-02-23 | End: 2022-04-28

## 2022-02-23 RX ORDER — ATORVASTATIN CALCIUM 80 MG/1
80 TABLET, FILM COATED ORAL
Status: DISCONTINUED | OUTPATIENT
Start: 2022-02-23 | End: 2022-02-24 | Stop reason: HOSPADM

## 2022-02-23 RX ADMIN — INSULIN LISPRO 3 UNITS: 100 INJECTION, SOLUTION INTRAVENOUS; SUBCUTANEOUS at 16:40

## 2022-02-23 RX ADMIN — AMLODIPINE BESYLATE 2.5 MG: 2.5 TABLET ORAL at 13:04

## 2022-02-23 RX ADMIN — METOPROLOL TARTRATE 25 MG: 25 TABLET, FILM COATED ORAL at 13:06

## 2022-02-23 RX ADMIN — FUROSEMIDE 40 MG: 10 INJECTION, SOLUTION INTRAMUSCULAR; INTRAVENOUS at 03:51

## 2022-02-23 RX ADMIN — IOHEXOL 85 ML: 350 INJECTION, SOLUTION INTRAVENOUS at 02:57

## 2022-02-23 RX ADMIN — IPRATROPIUM BROMIDE AND ALBUTEROL SULFATE 3 ML: 2.5; .5 SOLUTION RESPIRATORY (INHALATION) at 13:31

## 2022-02-23 RX ADMIN — Medication 8 ML: at 11:59

## 2022-02-23 RX ADMIN — CEFTRIAXONE 2000 MG: 2 INJECTION, SOLUTION INTRAVENOUS at 02:17

## 2022-02-23 RX ADMIN — HEPARIN SODIUM 2000 UNITS: 1000 INJECTION INTRAVENOUS; SUBCUTANEOUS at 16:39

## 2022-02-23 RX ADMIN — HEPARIN SODIUM 4000 UNITS: 1000 INJECTION INTRAVENOUS; SUBCUTANEOUS at 07:11

## 2022-02-23 RX ADMIN — ASPIRIN 81 MG CHEWABLE TABLET 324 MG: 81 TABLET CHEWABLE at 06:07

## 2022-02-23 RX ADMIN — INSULIN LISPRO 1 UNITS: 100 INJECTION, SOLUTION INTRAVENOUS; SUBCUTANEOUS at 13:12

## 2022-02-23 RX ADMIN — ATORVASTATIN CALCIUM 80 MG: 40 TABLET, FILM COATED ORAL at 07:10

## 2022-02-23 RX ADMIN — FUROSEMIDE 40 MG: 10 INJECTION, SOLUTION INTRAMUSCULAR; INTRAVENOUS at 16:40

## 2022-02-23 RX ADMIN — HEPARIN SODIUM 12 UNITS/KG/HR: 10000 INJECTION, SOLUTION INTRAVENOUS at 07:11

## 2022-02-23 RX ADMIN — PERFLUTREN 0.8 ML/MIN: 6.52 INJECTION, SUSPENSION INTRAVENOUS at 11:26

## 2022-02-23 NOTE — DISCHARGE SUMMARY
Discharge Summary - [facility]    Patient Information: Jerson Jang 60 y o  male MRN: [id number]  Unit/Bed#: [address] [place] [address] Encounter: [id number]    Discharging Physician / Practitioner: Yifan Calles MD  PCP: Carmen Hughes MD  Admission Date: 2/23/2022  Discharge Date: 02/23/22    Reason for Admission: Shortness of Breath (Patient brought by ems for SOBx2 days  On arrival patient labored breathing skin dusky, cool and clammy  Patient unable to speak in full [facility]  EDMD Dr Markus Hernandez brought to bedside  )      Discharge Diagnoses:     Principal Problem:    Acute on chronic systolic congestive heart failure ([facility] )  Active Problems:    Elevated troponin    Acute on chronic respiratory failure with hypoxia and hypercapnia (HCC)    Benign essential hypertension    Pleural effusion, bilateral    Type 2 diabetes mellitus, with long-term current use of insulin (HCC)    Hyperlipidemia    PVD (peripheral vascular disease) ([facility])    Unilateral complete BKA ([facility] )  Resolved Problems:    * No resolved hospital problems  *        Acute on chronic respiratory failure with hypoxia and hypercapnia (HCC)  Assessment & Plan  Initially required BiPAP in ED  Now improved  Monitor respiratory status  Diuresis as tolerated  Continue supplemental oxygen    Elevated troponin  Assessment & Plan  - no chest pain / no cardiac history   - has multiple risk factors of uncontrolled DM, PVD HTN Dyslipidemia   - CTA chest negative for pulmonary embolism  Noted to abnormal EKG and positive cardiac markers  Echocardiogram with EF 35%, concerning for ischemic etiology  Remains chest pain-free    Hemodynamically stable  · Continue aspirin, metoprolol, statin  · On IV heparin  · Further ischemic workup as per Cardiology   · - avoid NTG since patient takes sildenafil       * Acute on chronic systolic congestive heart failure ([facility] )  Assessment & Plan  - saturating 50s on arrival   - improved with bipap and gradually weaned to nasal canula 2-4 litres saturating above 90%   - CTA chest with bilateral large pleural effusions and Bilateral patchy groundglass opacities with interlobular septal thickening suspicious for pulmonary edema    - D dimer elevated above 2 but CTA chest negative for pulmonary embolism , noted to have pulmonary edema and effusion   As per daughter , he has had covid in [place]  covid screen in ER negative on admission  Improved with diuresis and thoracentesis  Currently saturating in mid 90s on 2 L of supplemental oxygen  Reports improvement in breathing  Echocardiogram with EF 35%  · Continue diuresis  · Monitor intake output  · Daily weight  · Optimize CHF medication  · Follow-up BMP  · Ischemic workup as per Cardiology  · Follow-up pleural fluid studies    Type 2 diabetes mellitus, with long-term current use of insulin [facility])  Assessment & Plan  Lab Results   Component Value Date    HGBA1C 6 5 (H) 02/23/2022       Recent Labs     02/23/22  0808 02/23/22  1310 02/23/22  1608   POCGLU 221* 175* 248*       Blood Sugar Average: Last 72 hrs:  (P) 683 [id number]     Poor diet compliance as per family  On Lantus 68 units but does not check his blood sugar regularly  Denies symptoms and events of hypoglycemia  Insulin sliding scale   Diabetic diet  Monitor    Pleural effusion, bilateral  Assessment & Plan  Likely secondary to CHF  Status post large volume thoracentesis bilaterally  · Follow-up fluid studies    Benign essential hypertension  Assessment & Plan  Continue metoprolol and lisinopril    Norvasc was discontinued  Consider transitioning to ARB as patient reports chronic cough    Unilateral complete BKA ([address] )  Assessment & Plan  On left side 4 years ago   Sec to pvd sec to DM   Sees podiatry out pt     PVD (peripheral vascular disease) ([address] )  Assessment & Plan  - sec to DM   S/p left BKA 4 years ago   Sees podiatry and recently saw them for right heel ulcer   No fever or s/s to suggest active infection Hyperlipidemia  Assessment & Plan  Continue statin      Consultations During Hospital Stay:  IP CONSULT TO CARDIOLOGY  IP CONSULT TO PULMONOLOGY    Procedures Performed:     · Echocardiogram  · Thoracentesis    Significant Findings:     · Refer to hospital course and above listed diagnosis related plan for details    Imaging while in hospital:    IR IN-Patient Thoracentesis    Result Date: 2/23/2022  Narrative: PROCEDURE: Diagnostic and therapeutic bilateral thoracentesis STAFF: BURTON Christophern: Multiple  COMPLICATIONS: None  INDICATION: Symptomatic bilateral pleural effusions  PROCEDURE: Using ultrasound guidance, the left pleural cavity was punctured and a catheter placed into the pleural cavity  A total of 1400 milliliters of fluid was removed  The catheter was then removed  Using ultrasound guidance, the right pleural cavity was punctured and a catheter placed into the pleural cavity  A total of 1350 milliliters of fluid was removed  The catheter was then removed  FINDINGS: Massive bilateral pleural effusions  Blood-tinged pleural fluid was removed  Impression: Diagnostic and therapeutic bilateral thoracentesis  Workstation performed: [id number]     CTA ED chest PE Study    Result Date: 2/23/2022  Narrative: CTA - CHEST WITH IV CONTRAST - PULMONARY ANGIOGRAM INDICATION:   r/o PE  COMPARISON: None  TECHNIQUE: CTA examination of the chest was performed using angiographic technique according to a protocol specifically tailored to evaluate for pulmonary embolism  Axial, sagittal, and coronal 2D reformatted images were created from the source data and  submitted for interpretation  In addition, coronal 3D MIP postprocessing was performed on the acquisition scanner  Radiation dose length product (DLP) for this visit:  579 mGy-cm     This examination, like all CT scans performed in the [facility], was performed utilizing techniques to minimize radiation dose exposure, including the use of iterative reconstruction and automated exposure control  IV Contrast:  85 mL of iohexol (OMNIPAQUE)  FINDINGS: PULMONARY ARTERIAL TREE:  No pulmonary embolus is seen  LUNGS:  Bilateral patchy groundglass opacities  There is interlobular septal thickening  Bilateral lower lobe compressive atelectasis adjacent to the effusions  There is no tracheal or endobronchial lesion  PLEURA:  Large bilateral pleural effusions  HEART/GREAT VESSELS:  Unremarkable for patient's age  No thoracic aortic aneurysm  Coronary artery calcification  MEDIASTINUM AND SANAZ:  Small calcified right hilar lymph nodes  No mediastinal or hilar adenopathy  CHEST WALL AND LOWER NECK:   Unremarkable  VISUALIZED STRUCTURES IN THE UPPER ABDOMEN:  Unremarkable  OSSEOUS STRUCTURES:  No acute fracture or destructive osseous lesion  Impression: No evidence of pulmonary embolus  Bilateral patchy groundglass opacities with interlobular septal thickening suspicious for pulmonary edema  Large bilateral pleural effusions  The study was marked in [facility] for immediate notification  Workstation performed: [id number]     Echo complete w/ contrast if indicated    Addendum Date: 2/23/2022 Addendum:     Left Ventricle: Left ventricular cavity size is normal  With definity/contrast basal inferiolateral and anteroseptum appear hypokinetic  Wall thickness is mildly increased  Systolic function is moderately reduced  Estimated ejection fraction is 35-40%  There is mild concentric hypertrophy  Diastolic dysfunction, grade indeterminate due to MAC    Right Ventricle: Systolic function is normal   TAPSE is 2 1 cm    Left Atrium: The atrium is severely dilated    Aortic Valve: The aortic valve is trileaflet  The leaflets are moderately thickened  The leaflets are moderately calcified  There is mild to moderately reduced mobility  There is mild to moderate stenosis  Mean/peak gradient is 14/29 mm Hg, VELIA is 1 4-1 5 cm², and DVI is 0 47    Mitral Valve: There is mild thickening  There is moderate annular calcification  There is mild to moderate regurgitation    Tricuspid Valve: There is mild regurgitation    Pulmonic Valve: There is mild regurgitation    Pericardium: There is a trivial pericardial effusion  There is no echocardiographic evidence of tamponade  Large pleural effusion is noted  Result Date: 2/23/2022  Narrative: [facility]  Left Ventricle: Left ventricular cavity size is normal  With definity/contrast basal inferiolateral and anteroseptum appear hypokinetic  Wall thickness is mildly increased  Systolic function is moderately reduced  Estimated ejection fraction is 35-40%  There is mild concentric hypertrophy  Diastolic dysfunction, grade indeterminate due to MAC    Right Ventricle: Systolic function is normal   TAPSE is 2 1 cm    Left Atrium: The atrium is severely dilated    Aortic Valve: The aortic valve is trileaflet  The leaflets are moderately thickened  The leaflets are moderately calcified  The leaflets exhibit normal mobility  There is mild to moderate stenosis  Mean/peak gradient is 14/29 mm Hg, VELIA is 1 4-1 5 cm², and DVI is 0 47    Mitral Valve: There is mild thickening  There is moderate annular calcification  There is mild to moderate regurgitation    Tricuspid Valve: There is mild regurgitation    Pulmonic Valve: There is mild regurgitation    Pericardium: There is a trivial pericardial effusion  There is no echocardiographic evidence of tamponade  Large pleural effusion is noted         Incidental Findings:   · None    Test Results Pending at Discharge (will require follow up):   · As per After Visit Summary     Outpatient Tests Requested:  · Not applicable, patient is being transferred    Complications:  Refer to hospital course and above listed diagnosis related plan, if any    Hospital Course:     Micah Awan is a 60 y o  male patient with history of diabetes mellitus type 2, hypertension, dyslipidemia, history of left BKA secondary to diabetic foot infection who originally presented to the hospital on 2/23/2022 due to worsening of shortness of breath  Patient reported associated mild chest tightness  Patient denied any fever chills  Patient initially reported has been going on for 1-2 days but as per the family this symptoms are gradually progressing over last few weeks  Patient was noted to be hypoxic and in respiratory distress in ED require BiPAP support with subsequent improvement  In ED, patient was noted to have abnormal EKG, elevated troponin, elevated proBNP  CTA chest was done for elevated D-dimer and recent history of COVID, which did not reveal pulmonary embolism but noted to have evidence of pulmonary edema and large bilateral pleural effusions  Patient was admitted to telemetry, treated with diuresis also maintained on IV heparin  Patient underwent thoracentesis with symptomatic improvement  Echocardiogram revealed ejection fraction 35-40%  Patient remained chest pain-free with improvement in breathing  Patient was evaluated by Cardiology, further ischemic workup was recommended with cardiac catheterization  After discussion with patient and family with Cardiology, patient/family requested to be transferred to [facility] for further cardiac and ischemic workup and further care  Coordinated with the transfer center and information was provided  Please see above list of diagnoses and related plan for additional information         Condition at Discharge: stable     Discharge Day Visit / Exam:     Subjective:  Reports that his breathing is much better  Denies any chest pain/pressure  Reports mild discomfort  Denies any abdominal pain    Vitals: Blood Pressure: 113/62 (02/23/22 1542)  Pulse: 62 (02/23/22 1542)  Temperature: (!) 97 3 °F (36 3 °C) (02/23/22 1542)  Temp Source: Oral (02/23/22 0215)  Respirations: 18 (02/23/22 1542)  Height: 5' 7" (170 2 cm) (02/23/22 0929)  Weight - Scale: 79 8 kg (176 lb) (02/23/22 0929)  SpO2: 96 % (02/23/22 1542)  Exam:   Physical Exam  Constitutional:       General: He is not in acute distress  Appearance: He is ill-appearing (Chronically)  HENT:      Head: Normocephalic and atraumatic  Cardiovascular:      Rate and Rhythm: Normal rate  Heart sounds: Murmur heard  Pulmonary:      Effort: Pulmonary effort is normal  No respiratory distress  Breath sounds: Examination of the right-lower field reveals rales  Examination of the left-lower field reveals rales  Decreased breath sounds and rales present  No wheezing or rhonchi  Chest:      Chest wall: No tenderness  Abdominal:      General: Bowel sounds are normal  There is distension (Mild)  Palpations: Abdomen is soft  Tenderness: There is no abdominal tenderness  There is no guarding or rebound  Musculoskeletal:         General: Deformity (Left BKA) present  Right lower leg: Edema present  Skin:     General: Skin is warm and dry  Findings: No rash  Neurological:      Mental Status: He is alert  Mental status is at baseline  Cranial Nerves: No cranial nerve deficit  Discharge instructions/Information to patient and family:(Discharge Medications and Follow up):   See after visit summary for information provided to patient and family  Provisions for Follow-Up Care:  See after visit summary for information related to follow-up care and any pertinent home health orders  Disposition: Transferred to [facility]    Planned Readmission:  No     Discharge Statement:  I spent 55 minutes discharging the patient  This time was spent on the day of discharge  I had direct contact with the patient on the day of discharge  Greater than 50% of the total time was spent examining patient, answering all patient questions, arranging and discussing plan of care with patient as well as directly providing post-discharge instructions    Additional time then spent on discharge activities  Coordinated with Cardiology  Discussed with accepting transfer center at [facility]  Accepting cardiologist was updated by Dr Giorgi Foley  Updated patient's daughter regarding transfer at the time of discharge  Discharge Medications:  See after visit summary for reconciled discharge medications provided to patient and family  ** Please Note: "This note has been constructed using a voice recognition system  Therefore there may be syntax, spelling, and/or grammatical errors   Please call if you have any questions  "**

## 2022-02-23 NOTE — PROGRESS NOTES
Interventional Radiology Preprocedure Note    History/Indication for procedure:   Kamla Mendez is a 60 y o  male with a PMH of pulmonary edema and NSTEMI who presents for therapeutic and/or diagnostic bilateral thoracentesis      /73   Pulse 77   Temp 98 3 °F (36 8 °C)   Resp 18   Ht 5' 7" (1 702 m)   Wt 79 8 kg (176 lb)   SpO2 98%   BMI 27 57 kg/m²     Relevant past medical history:    Past Medical History:   Diagnosis Date    Diabetes mellitus ([place] [address] )     Hyperlipidemia     Hypertension      Patient Active Problem List   Diagnosis    Anxiety disorder    Benign essential hypertension    Diabetes mellitus with peripheral circulatory disorder, controlled ([place] [address] )    Male erectile disorder    Hyperlipidemia    PVD (peripheral vascular disease) ([place] [address] )    Amputated below knee ([place] [address] )    Gastroesophageal reflux disease without esophagitis    BMI 29 0-29 9,adult    Pressure ulcer of BKA stump, stage 2 (HCC)    Unilateral complete BKA (HCC)    Diabetes mellitus with ulcer of foot (HCC)    Ulcer of right heel, with fat layer exposed (HCC)    Shortness of breath    Elevated troponin    Pleural effusion, bilateral    Type 2 diabetes mellitus, with long-term current use of insulin (HCC)       Medications:    Inpatient Medications:     Scheduled Medications:  Current Facility-Administered Medications   Medication Dose Route Frequency Provider Last Rate    amLODIPine  2 5 mg Oral Daily Juliocesar Miramontes MD      [START ON 2/24/2022] aspirin  81 mg Oral Daily DISHA Zuniga      atorvastatin  80 mg Oral Daily With Michelle Root MD      furosemide  40 mg Intravenous BID (diuretic) DISHA Zuniga      heparin (porcine)  3-20 Units/kg/hr (Order-Specific) Intravenous Titrated Juliocesar Miramontes MD 12 Units/kg/hr (02/23/22 0830)    heparin (porcine)  2,000 Units Intravenous Q1H PRN Juliocesar Miramontes MD      heparin (porcine)  4,000 Units Intravenous Q1H PRN Juliocesar Miramontse MD      insulin glargine  68 Units Subcutaneous HS Jerardo Doyle MD      insulin lispro  1-5 Units Subcutaneous HS Jerardo Doyle MD      insulin lispro  1-6 Units Subcutaneous TID [facility] Jerardo Doyle MD      ipratropium-albuterol  3 mL Nebulization Q6H Jerardo Doyle MD      lidocaine 1% buffered   Infiltration Code/Trauma/Sedation Med Denver Rodriguez MD      [START ON 2/24/2022] lisinopril  20 mg Oral Daily DISHA Weinstein      metoprolol tartrate  25 mg Oral Q12H [address] KARI WeinsteinNP         Infusions:  heparin (porcine), 3-20 Units/kg/hr (Order-Specific), Last Rate: 12 Units/kg/hr (02/23/22 0830)        PRN:  heparin (porcine)    heparin (porcine)    lidocaine 1% buffered    Outpatient Medications:  No current facility-administered medications on file prior to encounter  Current Outpatient Medications on File Prior to Encounter   Medication Sig Dispense Refill    amLODIPine (NORVASC) 2 5 mg tablet TAKE 1 TABLET BY MOUTH EVERY DAY 90 tablet 1    Lantus SoloStar 100 units/mL injection pen INJECT 68 UNITS UNDER THE SKIN DAILY 15 mL 5    lisinopril (ZESTRIL) 40 mg tablet Take 1 tablet (40 mg total) by mouth daily 90 tablet 1    rosuvastatin (CRESTOR) 20 MG tablet TAKE 1 TABLET BY MOUTH EVERY DAY 90 tablet 1    B-D UF III MINI PEN NEEDLES 31G X 5 MM MISC USE AS DIRECTED 50 each 0    CHUYITA MICROLET LANCETS lancets by Does not apply route      glucose blood (CHUYITA CONTOUR TEST) test strip by In Vitro route      Insulin Pen Needle 31G X 5 MM MISC BD Ultra-Fine Mini Pen Needle 31 gauge x 3/16"      sildenafil (VIAGRA) 100 mg tablet Take by mouth         Allergies   Allergen Reactions    Bee Venom Anaphylaxis     Reaction Date: 20Oct2005;     Penicillins Rash     Reaction Date: 17Oct2005;         Anticoagulants: none    Labs:   CBC with diff:   Lab Results   Component Value Date    WBC 12 71 (H) 02/23/2022    HGB 12 4 02/23/2022    HCT 42 6 02/23/2022     (H) 02/23/2022     02/23/2022    MCH 29 0 02/23/2022    MCHC 29 1 (L) 02/23/2022    RDW 14 8 02/23/2022    MPV 10 1 02/23/2022    NRBC 0 02/23/2022     BMP/CMP:  Lab Results   Component Value Date     10/30/2017    K 3 9 02/23/2022    K 5 0 07/01/2021     02/23/2022     07/01/2021    CO2 31 02/23/2022    CO2 25 07/01/2021    BUN 14 02/23/2022    BUN 14 07/01/2021    CREATININE 0 92 02/23/2022    CREATININE 0 91 10/30/2017    GLUCOSE 203 (H) 10/30/2017    CALCIUM 8 1 (L) 02/23/2022    CALCIUM 9 1 10/30/2017    AST 22 02/23/2022    AST 21 07/01/2021    ALT 32 02/23/2022    ALT 23 07/01/2021    ALKPHOS 99 02/23/2022    ALKPHOS 90 10/30/2017    PROT 6 8 10/30/2017    BILITOT 0 4 10/30/2017    EGFR 91 02/23/2022   ,     Coags:   Lab Results   Component Value Date    PTT 28 02/23/2022    INR 1 11 02/23/2022   ,   Results from last 7 days   Lab Units 02/23/22  0144   PTT seconds 28   INR  1 11        Relevant imaging studies:   Reviewed  Directed physical examination:  I agree with the physical exam performed on 2/22/22 and there are no additional changes  Assessment/Plan:   Therapeutic and/or diagnostic bilateral thoracentesis  Sedation/Anesthesia plan:  Local sedation will be used as needed for procedure      Consent with alternatives to the procedure, risks and benefits have been explained and discussed with the patient/patient's family: yes

## 2022-02-23 NOTE — H&P
[address] 1965, 60 y o  male MRN: [id number]  Unit/Bed#: ED 06 Encounter: [id number]  Primary Care Provider: Faisal Babcock MD   Date and time admitted to hospital: 2/23/2022  1:41 AM    * Shortness of breath  Assessment & Plan  - saturating 50s on arrival   - improved with bipap and gradually weaned to nasal canula 2-4 litres saturating above 90%   - CTA chest with bilateral large pleural effusions and Bilateral patchy groundglass opacities with interlobular septal thickening suspicious for pulmonary edema    - D dimer elevated above 2 but CTA chest negative for pulmonary embolism   - elevated troponin / no chest pain     Etiology sec to bilateral pleural effusions +/- cardiac   As per ER attnd patient's daughter revealed he has had covid in [place]  covid screen in ER negative today     Started on heparin gtt   S/p one dose rocephin in ER   Septic work up   Echo   pulm consult for diagnostic and therapeutic thoracocentesis   and cardiology consult for elevated troponin          Elevated troponin  Assessment & Plan  - no chest pain / no cardiac history   - has risk factors of uncontrolled DM, PVD HTN Dyslipidemia   - CTA chest negative for pulmonary embolism  - started on heparin gtt  - asa in ER , Lipitor 80 mg one dose ordered to be given   - c/w home medications   - avoid NTG unless okay with cardiology since patient takes sildenafil   - trend trop   Echo   Cardio consulted     Pleural effusion, bilateral  Assessment & Plan  Etiology ?    Cardiac/ infectious/ malignancy   Patient non smoker   Large bilateral   pulm consulted for diagnostic and therapeutic thoracocentesis     PVD (peripheral vascular disease) ([address] )  Assessment & Plan  - sec to DM   S/p left BKA 4 years ago   Sees podiatry and recently saw them for right heel ulcer   No fever or s/s to suggest active infection   Low threshold to consult podiatry     Type 2 diabetes mellitus, with long-term current use of insulin [facility])  Assessment & Plan  Lab Results   Component Value Date    HGBA1C 6 8 (H) 07/01/2021       No results for input(s): POCGLU in the last 72 hours  Blood Sugar Average: Last 72 hrs:       a1c  Insulin sliding scale   Diabetic diet   Resume home insulin     Unilateral complete BKA (HCC)  Assessment & Plan  On left side 4 years ago   Sec to pvd sec to DM   Sees podiatry out pt     Hyperlipidemia  Assessment & Plan  - statin dose increased to 80 mg lipitor - one dose now     Benign essential hypertension  Assessment & Plan  - in norvasc and lisinopril     VTE Pharmacologic Prophylaxis: VTE Score: 8 Moderate Risk (Score 3-4) - Pharmacological DVT Prophylaxis Ordered: heparin drip  Code Status: No Order         Anticipated Length of Stay: Patient will be admitted on an inpatient basis with an anticipated length of stay of greater than 2 midnights secondary to sob   Total Time for Visit, including Counseling / Coordination of Care: 60 minutes Greater than 50% of this total time spent on direct patient counseling and coordination of care  Chief Complaint: sob     History of Present Illness:  Mandy Yu is a 60 y o  male with a PMH of poorly controlled DM HTN dyslipidemia PVD BKA on left came in with sob x 1 day duration   Started noon yesterday and became worse by evening  when he was watching game  Saturating 50 % on RA  in ER   Placed on bipap and evaluated by ICU     Was able to be weaned off bipap to 2-4 L NC hence deemed stable for floors by ICU  Troponin and d dimer elevated   CTA chest negative for PE  Positive for large bilateral pleural effusions and Bilateral patchy groundglass opacities with interlobular septal thickening suspicious for pulmonary edema  rcvd lasix 40  Mg iv once that further improved sob  Started on heparin gtt asa and lipitor stat   No chest pain   No history of heart failure or pleural effusion s    As per daughter the patient had covid in January   screen covid in ER negative today   Cardiology and pulm consulted      Patient being admitted for new onset sob evaluation of elevated troponin and pleural effusions          Review of Systems:  Review of Systems   Constitutional: Positive for fatigue  Respiratory: Positive for shortness of breath  All other systems reviewed and are negative  Past Medical and Surgical History:   Past Medical History:   Diagnosis Date    Diabetes mellitus ([facility] [address] )     Hyperlipidemia     Hypertension        Past Surgical History:   Procedure Laterality Date    BELOW KNEE LEG AMPUTATION         Meds/Allergies:  Prior to Admission medications    Medication Sig Start Date End Date Taking? Authorizing Provider   amLODIPine (NORVASC) 2 5 mg tablet TAKE 1 TABLET BY MOUTH EVERY DAY 12/6/21   Allison Beltran MD   B-D UF III MINI PEN NEEDLES 31G X 5 MM MISC USE AS DIRECTED 1/4/21   Allison Beltran MD   CHUYITA MICROLET LANCETS lancets by Does not apply route 12/10/09   Historical Provider, MD   glucose blood (CHUYITA CONTOUR TEST) test strip by In Vitro route 12/10/09   Historical Provider, MD   Insulin Pen Needle 31G X 5 MM MISC BD Ultra-Fine Mini Pen Needle 31 gauge x 3/16"    Historical Provider, MD Madsen SoloStar 100 units/mL injection pen INJECT 68 UNITS UNDER THE SKIN DAILY 12/13/21   Allison Beltran MD   lisinopril (ZESTRIL) 40 mg tablet Take 1 tablet (40 mg total) by mouth daily 6/9/21   Allison Beltran MD   rosuvastatin (CRESTOR) 20 MG tablet TAKE 1 TABLET BY MOUTH EVERY DAY 12/6/21   Allison Beltran MD   sildenafil (VIAGRA) 100 mg tablet Take by mouth 7/2/14   Historical Provider, MD     I have reviewed home medications using recent Epic encounter  Allergies: Allergies   Allergen Reactions    Bee Venom Anaphylaxis     Reaction Date: [id number];     Penicillins Rash     Reaction Date: [id number];         Social History:  Marital Status: /Civil Union      Patient Pre-hospital Living Situation: Home  Patient Pre-hospital Level of Mobility: walks     Substance Use History:   Social History     Substance and Sexual Activity   Alcohol Use Not Currently     Social History     Tobacco Use   Smoking Status Former Smoker    Quit date:     Years since quittin 1   Smokeless Tobacco Never Used     Social History     Substance and Sexual Activity   Drug Use Never       Family History:  Family History   Problem Relation Age of Onset    No Known Problems Mother        Physical Exam:     Vitals:   Blood Pressure: 109/61 (22 06)  Pulse: 77 (22)  Temperature: (!) 97 4 °F (36 3 °C) (22)  Temp Source: Oral (22)  Respirations: (!) 32 (22)  Height: 5' 7" (170 2 cm) (22)  Weight - Scale: 80 1 kg (176 lb 8 oz) (22)  SpO2: 96 % (22)    Physical Exam  Constitutional:       Appearance: Normal appearance  HENT:      Head: Normocephalic and atraumatic  Eyes:      Extraocular Movements: Extraocular movements intact  Pupils: Pupils are equal, round, and reactive to light  Cardiovascular:      Rate and Rhythm: Regular rhythm  Heart sounds: Normal heart sounds  Pulmonary:      Breath sounds: Rhonchi present  Abdominal:      General: Abdomen is flat  Bowel sounds are normal    Musculoskeletal:      Cervical back: Normal range of motion and neck supple  Comments: Left bka      Skin:     General: Skin is warm  Neurological:      Mental Status: He is alert  Mental status is at baseline             Additional Data:     Lab Results:  Results from last 7 days   Lab Units 22  0144   WBC Thousand/uL 12 71*   HEMOGLOBIN g/dL 12 4   HEMATOCRIT % 42 6   PLATELETS Thousands/uL 310   NEUTROS PCT % 81*   LYMPHS PCT % 10*   MONOS PCT % 6   EOS PCT % 2     Results from last 7 days   Lab Units 22  0144   SODIUM mmol/L 144   POTASSIUM mmol/L 3 9   CHLORIDE mmol/L 107   CO2 mmol/L 31   BUN mg/dL 14   CREATININE mg/dL 0 92   ANION GAP mmol/L 6 CALCIUM mg/dL 8 1*   ALBUMIN g/dL 2 8*   TOTAL BILIRUBIN mg/dL 0 38   ALK PHOS U/L 99   ALT U/L 32   AST U/L 22   GLUCOSE RANDOM mg/dL 260*     Results from last 7 days   Lab Units 02/23/22  0144   INR  1 11             Results from last 7 days   Lab Units 02/23/22  0507 02/23/22  0155 02/23/22  0144   LACTIC ACID mmol/L  --  2 0  --    PROCALCITONIN ng/ml 0 13  --  <0 05       Imaging: Reviewed radiology reports from this admission including: chest CT scan  CTA ED chest PE Study   Final Result by Sandrine Bae MD (02/23 1837)      No evidence of pulmonary embolus  Bilateral patchy groundglass opacities with interlobular septal thickening suspicious for pulmonary edema  Large bilateral pleural effusions  The study was marked in [facility] for immediate notification  Workstation performed: [id number]             EKG and Other Studies Reviewed on Admission:   · EKG: NSR  HR 77     ** Please Note: This note has been constructed using a voice recognition system   **

## 2022-02-23 NOTE — ASSESSMENT & PLAN NOTE
Lab Results   Component Value Date    HGBA1C 6 8 (H) 07/01/2021       No results for input(s): POCGLU in the last 72 hours      Blood Sugar Average: Last 72 hrs:       a1c  Insulin sliding scale   Diabetic diet   Resume home insulin

## 2022-02-23 NOTE — ASSESSMENT & PLAN NOTE
- no chest pain / no cardiac history   - has risk factors of uncontrolled DM, PVD HTN Dyslipidemia   - CTA chest negative for pulmonary embolism  - started on heparin gtt  - asa in ER , Lipitor 80 mg one dose ordered to be given   - c/w home medications   - avoid NTG unless okay with cardiology since patient takes sildenafil   - trend trop   Echo   Cardio consulted

## 2022-02-23 NOTE — ED NOTES
EDMD Dr Memo Velasquez made aware of patient change in Troponin a this time, requests stat ekg and will place further orders        Davin Ibarra RN  02/23/22 0700

## 2022-02-23 NOTE — EMTALA/ACUTE CARE TRANSFER
[address] [facility] [address]  Dept: [phone]      ACUTE CARE TRANSFER CONSENT    NAME Dolores Blue                                         1965                              MRN [id number]    I have been informed of my rights regarding examination, treatment, and transfer   by Dr Kenzie Hernandez MD    Benefits: Patient preference    Risks: Potential for delay in receiving treatment,Potential deterioration of medical condition,Loss of IV,Increased discomfort during transfer,Possible worsening of condition or death during transfer      Transfer Request:  I acknowledge that my medical condition has been evaluated and explained to me by the treating physician or other qualified medical person and/or my attending physician who has recommended and offered to me further medical examination and treatment  I understand the Hospital's obligation with respect to the treatment and stabilization of my medical condition  I nevertheless request to be transferred  I release the Hospital, the doctor, and any other persons caring for me from all responsibility or liability for any injury or ill effects that may result from my transfer and agree to accept all responsibility for the consequences of my choice to transfer, rather than receive stabilizing treatment at the Hospital  I understand that because the transfer is my request, my insurance may not provide reimbursement for the services  The Hospital will assist and direct me and my family in how to make arrangements for transfer, but the hospital is not liable for any fees charged by the transport service  In spite of this understanding, I refuse to consent to further medical examination and treatment which has been offered to me, and request transfer to  [facility] Name, [address] : [place]   I authorize the performance of emergency medical procedures and treatments upon me in both transit and upon arrival at the receiving facility  Additionally, I authorize the release of any and all medical records to the receiving facility and request they be transported with me, if possible  I authorize the performance of emergency medical procedures and treatments upon me in both transit and upon arrival at the receiving facility  Additionally, I authorize the release of any and all medical records to the receiving facility and request they be transported with me, if possible  I understand that the safest mode of transportation during a medical emergency is an ambulance and that the Hospital advocates the use of this mode of transport  Risks of traveling to the receiving facility by car, including absence of medical control, life sustaining equipment, such as oxygen, and medical personnel has been explained to me and I fully understand them  (EDE CORRECT BOX BELOW)  [ X ]  I consent to the stated transfer and to be transported by ambulance/helicopter  [  ]  I consent to the stated transfer, but refuse transportation by ambulance and accept full responsibility for my transportation by car  I understand the risks of non-ambulance transfers and I exonerate the Hospital and its staff from any deterioration in my condition that results from this refusal     X___________________________________________    DATE  22  TIME________  Signature of patient or legally responsible individual signing on patient behalf           RELATIONSHIP TO PATIENT_________________________          Provider Certification    NAME Andre Plata                                        [facility] 1965                              MRN [id number]    A medical screening exam was performed on the above named patient    Based on the examination:    Condition Necessitating Transfer new diagnosis of CHF, positive troponin requiring cardiac catheterization and further cardiac workup    Patient Condition: The patient has been stabilized such that within reasonable medical probability, no material deterioration of the patient condition or the condition of the unborn child(brandon) is likely to result from the transfer    Reason for Transfer: Patient/Family request    Transfer Requirements: Facility [facility] available and qualified personnel available for treatment as acknowledged by    · Agreed to accept transfer and to provide appropriate medical treatment as acknowledged by       Dr Leslie Rose, Dr Yenny Nguyen  · Appropriate medical records of the examination and treatment of the patient are provided at the time of transfer   [address] [facility], [address] _______  · Transfer will be performed by qualified personnel from    and appropriate transfer equipment as required, including the use of necessary and appropriate life support measures  Provider Certification: I have examined the patient and explained the following risks and benefits of being transferred/refusing transfer to the patient/family:  General risk, such as traffic hazards, adverse weather conditions, rough terrain or turbulence, possible failure of equipment (including vehicle or aircraft), or consequences of actions of persons outside the control of the transport personnel,Unanticipated needs of medical equipment and personnel during transport,Risk of worsening condition,The possibility of a transport vehicle being unavailable      Based on these reasonable risks and benefits to the patient and/or the unborn child(brandon), and based upon the information available at the time of the patients examination, I certify that the medical benefits reasonably to be expected from the provision of appropriate medical treatments at another medical facility outweigh the increasing risks, if any, to the individuals medical condition, and in the case of labor to the unborn child, from effecting the transfer      X____________________________________________ DATE 02/23/22        TIME_______      ORIGINAL - SEND TO MEDICAL RECORDS   COPY - SEND WITH PATIENT DURING TRANSFER

## 2022-02-23 NOTE — DISCHARGE INSTRUCTIONS
Thoracentesis   WHAT YOU NEED TO KNOW:   A thoracentesis is a procedure to remove extra fluid or air from between your lungs and your inner chest wall  Air or fluid buildup may make it hard for you to breathe  A thoracentesis allows your lungs to expand fully so you can breathe more easily  DISCHARGE INSTRUCTIONS:     Small amount of shoulder pain and bloody sputum is normal after a Thoracentesis  Rest:  Rest when you feel it is needed  Slowly start to do more each day  Return to your daily activities as directed  Resume your normal diet  Small sips of flat soda will help mild nausea  Do not smoke: If you smoke, it is never too late to quit  Ask for information about how to stop smoking if you need help  Contact Interventional Radiology at [phone] [place] PATIENTS: Contact Interventional Radiology at [phone]) Cleo Haque PATIENTS: Contact Interventional Radiology at [phone]) if:   · You have a fever  · Your puncture site is red, warm, swollen, or draining pus  · You have questions or concerns about your procedure, medicine, or care  Seek care immediately or call 911 if:   · Severe chest pain with inspiration and shortness of breath    · Large amounts of blood in your sputum    Follow up with your healthcare provider as directed

## 2022-02-23 NOTE — CONSULTS
Consultation - Cardiology   Candice Smith 60 y o  male MRN: [id number]  Unit/Bed#: [address] [place] [address] Encounter: [id number]    Assessment/Plan     Assessment:  1  Pulmonary edema with bilateral pleural effusions  2  Hypertension  3  Diabetes  4  Diabetic arterial disease status post left BKA  5  Dyslipidemia  6  Abnormal troponins, non MI troponin elevation related to supply versus demand mismatch  7  Murmur concerning of aortic stenosis      Plan:  Patient has been admitted to the hospitalist service  1  Will continue Lasix 40 mg IV b i d  With close monitoring of I&O, weights and electrolytes    2  2D echocardiogram to evaluate cardiac function, structure, and wall motion    3  Diabetic management per primary team    4  Will continue IV heparin at this time until echocardiogram is performed to evaluate any regional wall motion abnormalities  5  Will decrease lisinopril to 20 mg p o  Daily and add Lopressor 25 mg q 12 hours  Continue high-intensity statin with Lipitor 80 mg once a day    6  Patient received full-strength aspirin in emergency room, will continue aspirin 81 mg once a day starting 02/24/2022    7  Patient will need ischemic workup once respiratory status is stable  History of Present Illness   Physician Requesting Consult: Lamar Lam MD  Reason for Consult / Principal Problem:  Shortness of breath, positive troponins concerning for supply demand mismatch      HPI: Candice Smith is a 60y o  year old male who presented to the emergency room this morning with complaints of profound shortness of breath which had worsened over 2 days  Upon arrival to the emergency room it was documented that patient was labored in his breathing and his scan with just the cold and clammy  He was unable to speak in full sentences  Pulse oximetry noted room air saturation of 50%  Patient was placed on BiPAP, and given 40 mg of IV Lasix  Patient diuresed and was eventually transition to oxygen via nasal cannula  He was admitted to the telemetry unit for further evaluation  He notes he is still somewhat short of breath but feels significantly improved from presentation in the emergency room  NT proBNP was 3142, high sensitivity troponins were 42 and 2 hour troponin was 422 with a delta of 380  Twelve lead EKG showed sinus rhythm with T-wave inversion seen in lateral leads of V4 through V6  Patient has significant history of diabetes with diabetic ulcer and left BKA secondary to poor wound healing and hypertension  He states he has never had any type of cardiac workup  CT PE protocol was negative for PE but did note pulmonary edema and large bilateral pleural effusions  Inpatient consult to Cardiology  Consult performed by: DISHA Horner  Consult ordered by: Blanquita Velasco MD          Review of Systems   Constitutional: Positive for activity change, diaphoresis and fatigue  HENT: Negative  Negative for congestion, ear pain, nosebleeds, sinus pressure and trouble swallowing  Eyes: Negative  Negative for photophobia and visual disturbance  Respiratory: Positive for cough, chest tightness, shortness of breath and wheezing  Cardiovascular: Negative for palpitations and leg swelling  Gastrointestinal: Positive for abdominal distention  Negative for diarrhea, nausea and vomiting  Endocrine: Negative  Negative for polydipsia, polyphagia and polyuria  Genitourinary: Negative  Negative for difficulty urinating  Musculoskeletal: Negative  Negative for gait problem  Skin: Positive for pallor  Neurological: Negative  Negative for dizziness, syncope, weakness and light-headedness  Hematological: Negative  Psychiatric/Behavioral: Negative          Historical Information   Past Medical History:   Diagnosis Date    Diabetes mellitus ([facility] [address] )     Hyperlipidemia     Hypertension      Past Surgical History:   Procedure Laterality Date    BELOW KNEE LEG AMPUTATION       Social History Substance and Sexual Activity   Alcohol Use Not Currently     Social History     Substance and Sexual Activity   Drug Use Never     E-Cigarette/Vaping    E-Cigarette Use Never User      E-Cigarette/Vaping Substances    Nicotine No     THC No     CBD No     Flavoring No     Other No     Unknown No      Social History     Tobacco Use   Smoking Status Former Smoker    Quit date:     Years since quittin 1   Smokeless Tobacco Never Used     Family History:   Family History   Problem Relation Age of Onset    No Known Problems Mother        Meds/Allergies   all current active meds have been reviewed, current meds:   Current Facility-Administered Medications   Medication Dose Route Frequency    amLODIPine (NORVASC) tablet 2 5 mg  2 5 mg Oral Daily    atorvastatin (LIPITOR) tablet 80 mg  80 mg Oral Daily With Dinner    heparin (porcine) 25,000 units in 0 45% NaCl 250 mL infusion (premix)  3-20 Units/kg/hr (Order-Specific) Intravenous Titrated    heparin (porcine) injection 2,000 Units  2,000 Units Intravenous Q1H PRN    heparin (porcine) injection 4,000 Units  4,000 Units Intravenous Q1H PRN    insulin glargine (LANTUS) subcutaneous injection 68 Units 0 68 mL  68 Units Subcutaneous HS    insulin lispro (HumaLOG) 100 units/mL subcutaneous injection 1-5 Units  1-5 Units Subcutaneous HS    insulin lispro (HumaLOG) 100 units/mL subcutaneous injection 1-6 Units  1-6 Units Subcutaneous TID AC    ipratropium-albuterol (DUO-NEB) 0 5-2 5 mg/3 mL inhalation solution 3 mL  3 mL Nebulization Q6H    lisinopril (ZESTRIL) tablet 40 mg  40 mg Oral Daily    and PTA meds:   Prior to Admission Medications   Prescriptions Last Dose Informant Patient Reported? Taking?    B-D UF III MINI PEN NEEDLES 31G X 5 MM MISC   No No   Sig: USE AS DIRECTED   CHUYITA MICROLET LANCETS lancets   Yes No   Sig: by Does not apply route   Insulin Pen Needle 31G X 5 MM MISC   Yes No   Sig: BD Ultra-Fine Mini Pen Needle 31 gauge x 3/16" Tena SoloStar 100 units/mL injection pen   No No   Sig: INJECT 68 UNITS UNDER THE SKIN DAILY   amLODIPine (NORVASC) 2 5 mg tablet   No No   Sig: TAKE 1 TABLET BY MOUTH EVERY DAY   glucose blood (CHUYITA CONTOUR TEST) test strip   Yes No   Sig: by In Vitro route   lisinopril (ZESTRIL) 40 mg tablet   No No   Sig: Take 1 tablet (40 mg total) by mouth daily   rosuvastatin (CRESTOR) 20 MG tablet   No No   Sig: TAKE 1 TABLET BY MOUTH EVERY DAY   sildenafil (VIAGRA) 100 mg tablet   Yes No   Sig: Take by mouth      Facility-Administered Medications: None     Allergies   Allergen Reactions    Bee Venom Anaphylaxis     Reaction Date: [id number];     Penicillins Rash     Reaction Date: [id number]; Objective   Vitals: Blood pressure 118/61, pulse 76, temperature 98 3 °F (36 8 °C), resp  rate (!) 31, height 5' 7" (1 702 m), weight 80 1 kg (176 lb 8 oz), SpO2 93 %  Orthostatic Blood Pressures      Most Recent Value   Blood Pressure 118/61 filed at 02/23/2022 7376   Patient Position - Orthostatic VS Sitting filed at 02/23/2022 0600            Intake/Output Summary (Last 24 hours) at 2/23/2022 0952  Last data filed at 2/23/2022 0700  Gross per 24 hour   Intake 50 ml   Output 1450 ml   Net -1400 ml       Invasive Devices  Report    Peripheral Intravenous Line            Peripheral IV 02/23/22 Dorsal (posterior); Right Wrist <1 day    Peripheral IV 02/23/22 Left Antecubital <1 day    Peripheral IV 02/23/22 Left Hand <1 day                Physical Exam  Vitals and nursing note reviewed  Constitutional:       Appearance: Normal appearance  He is obese  He is ill-appearing  HENT:      Head: Normocephalic  Right Ear: External ear normal       Left Ear: External ear normal       Nose: Nose normal    Eyes:      General: No scleral icterus  Right eye: No discharge  Left eye: No discharge  Cardiovascular:      Rate and Rhythm: Normal rate  Pulses: Normal pulses  Heart sounds: Murmur heard  Systolic murmur is present with a grade of 2/6  Pulmonary:      Breath sounds: Examination of the right-middle field reveals rales  Examination of the right-lower field reveals decreased breath sounds  Examination of the left-lower field reveals decreased breath sounds  Decreased breath sounds and rales present  No wheezing or rhonchi  Abdominal:      General: Bowel sounds are normal  There is distension  Palpations: Abdomen is soft  Musculoskeletal:      Right lower leg: No edema  Left lower leg: No edema (Left BKA)  Skin:     General: Skin is warm  Capillary Refill: Capillary refill takes less than 2 seconds  Coloration: Skin is pale  Neurological:      General: No focal deficit present  Mental Status: He is alert and oriented to person, place, and time  Mental status is at baseline  Psychiatric:         Mood and Affect: Mood normal          Lab Results:   I have personally reviewed pertinent lab results      CBC with diff:   Results from last 7 days   Lab Units 02/23/22  0144   WBC Thousand/uL 12 71*   RBC Million/uL 4 28   HEMOGLOBIN g/dL 12 4   HEMATOCRIT % 42 6   MCV fL 100*   MCH pg 29 0   MCHC g/dL 29 1*   RDW % 14 8   MPV fL 10 1   PLATELETS Thousands/uL 310     CMP:   Results from last 7 days   Lab Units 02/23/22  0144   SODIUM mmol/L 144   CHLORIDE mmol/L 107   CO2 mmol/L 31   BUN mg/dL 14   CREATININE mg/dL 0 92   CALCIUM mg/dL 8 1*   AST U/L 22   ALT U/L 32   ALK PHOS U/L 99   EGFR ml/min/1 73sq m 91     HS Troponin:   0   Lab Value Date/Time    HSTNI0 42 02/23/2022 0155    HSTNI2 422 (H) 02/23/2022 0507     BNP:   Results from last 7 days   Lab Units 02/23/22  0144   POTASSIUM mmol/L 3 9   CHLORIDE mmol/L 107   CO2 mmol/L 31   BUN mg/dL 14   CREATININE mg/dL 0 92   CALCIUM mg/dL 8 1*   EGFR ml/min/1 73sq m 91     Coags:   Results from last 7 days   Lab Units 02/23/22  0144   PTT seconds 28   INR  1 11     Lipid Profile:   Results from last 7 days   Lab Units 22  0507   HDL mg/dL 49   LDL CALC mg/dL 75   TRIGLYCERIDES mg/dL 51     Imaging: I have personally reviewed pertinent reports      EK lead EKG demonstrated sinus rhythm, T-wave inversion was seen wall leads of V4 through V6  VTE Prophylaxis: Heparin    Code Status: No Order  Advance Directive and Living Will:      Power of :    POLST:      Jose Lawrence, [facility]  Cardiology

## 2022-02-23 NOTE — ASSESSMENT & PLAN NOTE
- sec to DM   S/p left BKA 4 years ago   Sees podiatry and recently saw them for right heel ulcer   No fever or s/s to suggest active infection   Low threshold to consult podiatry

## 2022-02-23 NOTE — SEDATION DOCUMENTATION
Pt tolerated bilateral thoracentesis  1400ml red fluids removed from left  1350ml red fluids removed from right  Vitals stable   Labs collected and sent

## 2022-02-23 NOTE — ED PROVIDER NOTES
Final Diagnosis:  1  Elevated troponin    2  Pleural effusion    3  SOB (shortness of breath)    4  Hyperlipidemia, unspecified hyperlipidemia type    5  Benign essential hypertension    6  CHF (congestive heart failure) ([address] )    7  Diabetes mellitus with peripheral circulatory disorder, controlled ([address] )    8  Shortness of breath        Chief Complaint   Patient presents with    Shortness of Breath     Patient brought by ems for SOBx2 days  On arrival patient labored breathing skin dusky, cool and clammy  Patient unable to speak in full [facility]  EDMD Dr Rohini Brady brought to bedside  HPI  67-year-old history of diabetes hypertension hyperlipidemia presents with respiratory distress  In short of breath for 2 days though his daughter notes that mid January he contracted COVID and has been short of breath since that time  On arrival he is diaphoretic he is gray in appearance he has increased work of breathing  Sats are in the 50s on 6 L nasal cannula  He received Solu-Medrol and a DuoNeb in route though he does not have a documented history of COPD  Lung sounds are poor  He denies any pain at this time  He notes some left chest pain    - No language barrier    - History obtained from patient  - There are limitations to the history obtained  - Previous charting underwent limited review with attention to last ED visits, labs, ekgs, and prior imaging  PMH:   has a past medical history of Diabetes mellitus ([facility] [address] ), Hyperlipidemia, and Hypertension  PSH:   has a past surgical history that includes Below knee leg amputation and IR thoracentesis (2/23/2022)       Social History:  Daughter present    ROS:    Pertinent positives/negatives:   Review of Systems   Unable to perform ROS: Acuity of condition        PE:     Physical exam highlights:   Physical Exam       Vitals:    02/23/22 1211 02/23/22 1331 02/23/22 1542 02/23/22 1542   BP: 113/70   113/62   BP Location:       Pulse: 74  (!) 53 62   Resp: 18  18 Temp:   (!) 97 3 °F (36 3 °C) (!) 97 3 °F (36 3 °C)   TempSrc:       SpO2: 100% 99% 99% 96%   Weight:       Height:         Vitals reviewed by me  Nursing note reviewed  Chaperone present for all sensitive exam   Const: Patient in acute respiratory distress and is ill appearing  HEENT: External ears normal  No protrusion drainage swelling  Nose normal  No drainage/traumatic deformity  MM dry  Mouth with baseline/symmetric movement  No trismus  Eyes: No squinting  No icterus  Tracks through the room with normal EOM  No tearing/swelling/drainage  Neck: ROM normal  No rigidity  No meningismus  Cards: Rate as per vitals  Compared to monitor sinus unless documented above  Regular  Overall appears perfused, certainly not cold, but appears greyish and hypoxic  Pulm: tachypneic, retracting, single word sentences, poor aeration, no wheeze  Abd: He's distended  No fluctuant wave  Patient without peritoneal pain with shifting/bumping the bed  MSK: ROM normal and baseline  No deformity  Skin: No new rashes visible  Earney Hummer  Edema right leg, left s/p amputation  Neuro: Nonfocal  Baseline  CN grossly intact  Moving all four with coordination  Psych: Normal behavior and affect  A:  - Nursing note reviewed  Ddx and MDM  covid    PE    ACS - trend trops  ASA and heparin drip  ekg w/o STEMI    Pneumonia  Culture and antibiotics    Copd  S/p treatment     Heart failure  Needs echo                   ED Course as of 02/24/22 0507   Wed Feb 23, 2022   0211 Zamzam Berkowitz  [id number]   0559 Procedure Note: EKG  Date/Time: 02/23/22 5:59 AM   Interpreted by: Ellen Anton  Indications / Diagnosis: CP  ECG reviewed by me, the ED Provider: yes   The EKG demonstrates:  Rhythm: sinus  tach  Intervals: normal intervals  Axis: normal axis  QRS/Blocks: normal QRS  ST Changes: No acute ST Changes, no STD/ERICK  Lateral depression?   T wave changes: none       0600 Procedure Note: EKG  Date/Time: 02/23/22 6:00 AM   Interpreted by: Kandy Bateman  Indications / Diagnosis: delta  ECG reviewed by me, the ED Provider: yes   The EKG demonstrates:  Rhythm: sinus    Intervals: normal intervals  Axis: normal axis  QRS/Blocks: normal QRS  ST Changes: No acute ST Changes, no STD/ERICK  T wave changes: none         IR IN-Patient Thoracentesis   Final Result   Diagnostic and therapeutic bilateral thoracentesis  Workstation performed: [id number]         CTA ED chest PE Study   Final Result      No evidence of pulmonary embolus  Bilateral patchy groundglass opacities with interlobular septal thickening suspicious for pulmonary edema  Large bilateral pleural effusions  The study was marked in [facility] for immediate notification        Workstation performed: [id number]           Orders Placed This Encounter   Procedures    Critical Care    Blood culture #1    Blood culture #2    COVID/FLU/RSV    Body fluid culture (Pleural Fluid Culture) and Gram stain    LD (LDH), Body Fluid    Total Protein, body fluid    CTA ED chest PE Study    IR IN-Patient Thoracentesis    CBC and differential    Comprehensive metabolic panel    Lactic acid    Procalcitonin with AM Reflex    Protime-INR    APTT    D-dimer, quantitative    NT-BNP PRO    Blood gas, venous    HS Troponin 0hr (reflex protocol)    HS Troponin I 2hr    Procalcitonin Reflex    Blood gas, venous    Blood gas, arterial    Hemoglobin A1c w/EAG Estimation (Orders if not completed within the last 90 days)    Lipid Panel with Direct LDL reflex    Body fluid white cell count with differential    Glucose, body fluid    pH, body fluid    APTT    Body Fluid Diff    Discharge Diet    Call provider for: active or persistent bleeding    Call provider for:  severe uncontrolled pain    Call provider for:  persistent dizziness or light-headedness    Inpatient consult to Cardiology    Inpatient consult to Pulmonology    Echo complete w/ contrast if indicated    INPATIENT ADMISSION    Transfer to other facility    Discharge patient     Labs Reviewed   CBC AND DIFFERENTIAL - Abnormal       Result Value Ref Range Status    WBC 12 71 (*) 4 31 - 10 16 Thousand/uL Final    RBC 4 28  3 88 - 5 62 Million/uL Final    Hemoglobin 12 4  12 0 - 17 0 g/dL Final    Hematocrit 42 6  36 5 - 49 3 % Final     (*) 82 - 98 fL Final    MCH 29 0  26 8 - 34 3 pg Final    MCHC 29 1 (*) 31 4 - 37 4 g/dL Final    RDW 14 8  11 6 - 15 1 % Final    MPV 10 1  8 9 - 12 7 fL Final    Platelets 853  891 - 390 Thousands/uL Final    nRBC 0  /100 WBCs Final    Neutrophils Relative 81 (*) 43 - 75 % Final    Immat GRANS % 0  0 - 2 % Final    Lymphocytes Relative 10 (*) 14 - 44 % Final    Monocytes Relative 6  4 - 12 % Final    Eosinophils Relative 2  0 - 6 % Final    Basophils Relative 1  0 - 1 % Final    Neutrophils Absolute 10 32 (*) 1 85 - 7 62 Thousands/µL Final    Immature Grans Absolute 0 05  0 00 - 0 20 Thousand/uL Final    Lymphocytes Absolute 1 24  0 60 - 4 47 Thousands/µL Final    Monocytes Absolute 0 77  0 17 - 1 22 Thousand/µL Final    Eosinophils Absolute 0 27  0 00 - 0 61 Thousand/µL Final    Basophils Absolute 0 06  0 00 - 0 10 Thousands/µL Final   COMPREHENSIVE METABOLIC PANEL - Abnormal    Sodium 144  136 - 145 mmol/L Final    Potassium 3 9  3 5 - 5 3 mmol/L Final    Chloride 107  100 - 108 mmol/L Final    CO2 31  21 - 32 mmol/L Final    ANION GAP 6  4 - 13 mmol/L Final    BUN 14  5 - 25 mg/dL Final    Creatinine 0 92  0 60 - 1 30 mg/dL Final    Comment: Standardized to IDMS reference method    Glucose 260 (*) 65 - 140 mg/dL Final    Comment: If the patient is fasting, the ADA then defines impaired fasting glucose as > 100 mg/dL and diabetes as > or equal to 123 mg/dL  Specimen collection should occur prior to Sulfasalazine administration due to the potential for falsely depressed results   Specimen collection should occur prior to Sulfapyridine administration due to the potential for falsely elevated results  Calcium 8 1 (*) 8 3 - 10 1 mg/dL Final    Corrected Calcium 9 1  8 3 - 10 1 mg/dL Final    AST 22  5 - 45 U/L Final    Comment: Specimen collection should occur prior to Sulfasalazine administration due to the potential for falsely depressed results  ALT 32  12 - 78 U/L Final    Comment: Specimen collection should occur prior to Sulfasalazine administration due to the potential for falsely depressed results  Alkaline Phosphatase 99  46 - 116 U/L Final    Total Protein 7 1  6 4 - 8 2 g/dL Final    Albumin 2 8 (*) 3 5 - 5 0 g/dL Final    Total Bilirubin 0 38  0 20 - 1 00 mg/dL Final    Comment: Use of this assay is not recommended for patients undergoing treatment with eltrombopag due to the potential for falsely elevated results  eGFR 91  ml/min/1 73sq m Final    Narrative:     [facility] guidelines for Chronic Kidney Disease (CKD):     Stage 1 with normal or high GFR (GFR > 90 mL/min/1 73 square meters)    Stage 2 Mild CKD (GFR = 60-89 mL/min/1 73 square meters)    Stage 3A Moderate CKD (GFR = 45-59 mL/min/1 73 square meters)    Stage 3B Moderate CKD (GFR = 30-44 mL/min/1 73 square meters)    Stage 4 Severe CKD (GFR = 15-29 mL/min/1 73 square meters)    Stage 5 End Stage CKD (GFR <15 mL/min/1 73 square meters)  Note: GFR calculation is accurate only with a steady state creatinine   D-DIMER, QUANTITATIVE - Abnormal    D-Dimer, Quant 3 25 (*) <0 50 ug/ml FEU Final    Comment: Reference and upper limits to exclude DVT and PE are the same  Do not use to exclude if clinical symptoms are present  Narrative: In the evaluation for possible pulmonary embolism, in the appropriate (Well's Score of 4 or less) patient, the age adjusted d-dimer cutoff for this patient can be calculated as:    Age x 0 01 (in ug/mL) for Age-adjusted D-dimer exclusion threshold for a patient over 50 years     NT-BNP PRO (BRAIN NATRIURETIC PEPTIDE) - Abnormal    NT-proBNP 3,146 (*) <125 pg/mL Final   BLOOD GAS, VENOUS - Abnormal    pH, Lorne 7 117 (*) 7 300 - 7 400 Final    pCO2, [place] 86 8 (*) 42 0 - 50 0 mm Hg Final    pO2, [place] 29 1 (*) 35 0 - 45 0 mm Hg Final    HCO3, L[place] 27 4  24 - 30 mmol/L Final    Base Excess, Lorne -3 9  mmol/L Final    O2 Content, Lorne 6 7  ml/dL Final    O2 HGB, VENOUS 37 0 (*) 60 0 - 80 0 % Final   HS TROPONIN I 2HR - Abnormal    hs TnI 2hr 422 (*) "Refer to ACS Flowchart"- see link ng/L Final    Comment:                                              Initial (time 0) result  If >=50 ng/L, Myocardial injury suggested ;  Type of myocardial injury and treatment strategy  to be determined  If 5-49 ng/L, a delta result at 2 hours and or 4 hours will be needed to further evaluate  If <4 ng/L, and chest pain has been >3 hours since onset, patient may qualify for discharge based on the HEART score in the ED  If <5 ng/L and <3hours since onset of chest pain, a delta result at 2 hours will be needed to further evaluate  Second Troponin (time 2 hours)  If calculated delta >= 20 ng/L,  Myocardial injury suggested ; Type of myocardial injury and treatment strategy to be determined  If 5-49 ng/L and the calculated delta is 5-19 ng/L, consult medical service for evaluation  Continue evaluation for ischemia on ecg and other possible etiology and repeat hs troponin at 4 hours  If delta is <5 ng/L at 2 hours, consider discharge based on risk stratification via the HEART score (if in ED), or SHAQUILLE risk score in IP/Observation      Delta 2hr hsTnI 380 (*) <20 ng/L Final   BLOOD GAS, VENOUS - Abnormal    pH, Lorne 7 227 (*) 7 300 - 7 400 Final    pCO2, [place] 73 0 (*) 42 0 - 50 0 mm Hg Final    pO2, L[place] 27 8 (*) 35 0 - 45 0 mm Hg Final    HCO3, Lorne 29 7  24 - 30 mmol/L Final    Base Excess, Lorne 0 4  mmol/L Final    O2 Content, Lorne 6 9  ml/dL Final    O2 HGB, VENOUS 39 1 (*) 60 0 - 80 0 % Final   BLOOD GAS, ARTERIAL - Abnormal    pH, Arterial 7 371  7 350 - 7 450 Final    PH ART TC 7 375  7 350 - 7 450 Final    pCO2, Arterial 50 3 (*) 36 0 - 44 0 mm Hg Final    PCO2 (TC) Arterial 49 6 (*) 36 0 - 44 0 mm Hg Final    pO2, Arterial 63 3 (*) 75 0 - 129 0 mm Hg Final    PO2 (TC) Arterial 62 0 (*) 75 0 - 129 0 mm Hg Final    HCO3, Arterial 28 5 (*) 22 0 - 28 0 mmol/L Final    Base Excess, Arterial 2 5  mmol/L Final    O2 Content, Arterial 15 7 (*) 16 0 - 23 0 mL/dL Final    O2 HGB,Arterial  91 6 (*) 94 0 - 97 0 % Final    SOURCE Radial, Right   Final    WILLIAM TEST Yes   Final    Temperature 98 0  Degrees Fehrenheit Final    Nasal Cannula 2   Final   COVID19, INFLUENZA A/B, RSV PCR, [facility] - Normal    SARS-CoV-2 Negative  Negative Final    INFLUENZA A PCR Negative  Negative Final    INFLUENZA B PCR Negative  Negative Final    RSV PCR Negative  Negative Final    Narrative:     FOR PEDIATRIC PATIENTS - copy/paste COVID Guidelines URL to browser: [email or web address]  bizk.[email or web address]    SARS-CoV-2 assay is a Nucleic Acid Amplification assay intended for the  qualitative detection of nucleic acid from SARS-CoV-2 in nasopharyngeal  swabs  Results are for the presumptive identification of SARS-CoV-2 RNA  Positive results are indicative of infection with SARS-CoV-2, the virus  causing COVID-19, but do not rule out bacterial infection or co-infection  with other viruses  Laboratories within the [place] and its  territories are required to report all positive results to the appropriate  public health authorities  Negative results do not preclude SARS-CoV-2  infection and should not be used as the sole basis for treatment or other  patient management decisions  Negative results must be combined with  clinical observations, patient history, and epidemiological information  This test has not been FDA cleared or approved  This test has been authorized by FDA under an Emergency Use Authorization  (EUA)   This test is only authorized for the duration of time the  declaration that circumstances exist justifying the authorization of the  emergency use of an in vitro diagnostic tests for detection of SARS-CoV-2  virus and/or diagnosis of COVID-19 infection under section 564(b)(1) of  the Act, 21 U  S C  428BOC-8(F)(2), unless the authorization is terminated  or revoked sooner  The test has been validated but independent review by FDA  and CLIA is pending  Test performed using [employer]: This RT-PCR assay targets N2,  a region unique to SARS-CoV-2  A conserved region in the E-gene was chosen  for pan-Sarbecovirus detection which includes SARS-CoV-2  LACTIC ACID, PLASMA - Normal    LACTIC ACID 2 0  0 5 - 2 0 mmol/L Final    Narrative:     Result may be elevated if tourniquet was used during collection  PROCALCITONIN TEST - Normal    Procalcitonin <0 05  <=0 25 ng/ml Final    Comment: Suspected Lower Respiratory Tract Infection (LRTI):  - LESS than or EQUAL to 0 25 ng/mL:   low likelihood for bacterial LRTI; antibiotics DISCOURAGED   - GREATER than 0 25 ng/mL:   increased likelihood for bacterial LRTI; antibiotics ENCOURAGED  Suspected Sepsis:  - Strongly consider initiating antibiotics in ALL UNSTABLE patients  - LESS than or EQUAL to 0 5 ng/mL:   low likelihood for bacterial sepsis; antibiotics DISCOURAGED   - GREATER than 0 5 ng/mL:   increased likelihood for bacterial sepsis; antibiotics ENCOURAGED   - GREATER than 2 ng/mL:   high risk for severe sepsis / septic shock; antibiotics strongly ENCOURAGED  Decisions on antibiotic use should not be based solely on Procalcitonin (PCT) levels  If PCT is low but uncertainty exists with stopping antibiotics, repeat PCT in 6-24 hours to confirm the low level   If antibiotics are administered (regardless if initial PCT was high or low), repeat PCT every 1-2 days to consider early antibiotic cessation (when GREATER than 80% decrease from the peak OR when PCT drops below designated cutoffs, whichever comes first), so long as the infection is NOT one that typically requires prolonged treatment durations (e g , bone/joint infections, endocarditis, Staph  aureus bacteremia)  Situations of FALSE-POSITIVE Procalcitonin values:  1) Newborns < 67 hours old  2) Massive stress from severe trauma / burns, major surgery, acute pancreatitis, cardiogenic / hemorrhagic shock, sickle cell crisis, or other organ perfusion abnormalities  3) Malaria and some Candidal infections  4) Treatment with agents that stimulate cytokines (e g , OKT3, anti-lymphocyte globulins, alemtuzumab, IL-2, granulocyte transfusion [NOT GCSFs])  5) Chronic renal disease causes elevated baseline levels (consider GREATER than 0 75 ng/mL as an abnormal cut-off); initiating HD/CRRT may cause transient decreases  6) Paraneoplastic syndromes from medullary thyroid or SCLC, some forms of vasculitis, and acute yzajk-xw-xlkt disease    Situations of FALSE-NEGATIVE Procalcitonin values:  1) Too early in clinical course for PCT to have reached its peak (may repeat in 6-24 hours to confirm low level)  2) Localized infection WITHOUT systemic (SIRS / sepsis) response (e g , an abscess, osteomyelitis, cystitis)  3) Mycobacteria (e g , Tuberculosis, MAC)  4) Cystic fibrosis exacerbations     PROTIME-INR - Normal    Protime 14 1  11 6 - 14 5 seconds Final    INR 1 11  0 84 - 1 19 Final   APTT - Normal    PTT 28  23 - 37 seconds Final    Comment: Therapeutic Heparin Range =  60-90 seconds   HS TROPONIN I 0HR - Normal    hs TnI 0hr 42  "Refer to ACS Flowchart"- see link ng/L Final    Comment:                                              Initial (time 0) result  If >=50 ng/L, Myocardial injury suggested ;  Type of myocardial injury and treatment strategy  to be determined  If 5-49 ng/L, a delta result at 2 hours and or 4 hours will be needed to further evaluate    If <4 ng/L, and chest pain has been >3 hours since onset, patient may qualify for discharge based on the HEART score in the ED  If <5 ng/L and <3hours since onset of chest pain, a delta result at 2 hours will be needed to further evaluate  Second Troponin (time 2 hours)  If calculated delta >= 20 ng/L,  Myocardial injury suggested ; Type of myocardial injury and treatment strategy to be determined  If 5-49 ng/L and the calculated delta is 5-19 ng/L, consult medical service for evaluation  Continue evaluation for ischemia on ecg and other possible etiology and repeat hs troponin at 4 hours  If delta is <5 ng/L at 2 hours, consider discharge based on risk stratification via the HEART score (if in ED), or SHAQUILLE risk score in IP/Observation  PROCALCITONIN REFLEX - Normal    Procalcitonin 0 13  <=0 25 ng/ml Final    Comment: Suspected Lower Respiratory Tract Infection (LRTI):  - LESS than or EQUAL to 0 25 ng/mL:   low likelihood for bacterial LRTI; antibiotics DISCOURAGED   - GREATER than 0 25 ng/mL:   increased likelihood for bacterial LRTI; antibiotics ENCOURAGED  Suspected Sepsis:  - Strongly consider initiating antibiotics in ALL UNSTABLE patients  - LESS than or EQUAL to 0 5 ng/mL:   low likelihood for bacterial sepsis; antibiotics DISCOURAGED   - GREATER than 0 5 ng/mL:   increased likelihood for bacterial sepsis; antibiotics ENCOURAGED   - GREATER than 2 ng/mL:   high risk for severe sepsis / septic shock; antibiotics strongly ENCOURAGED  Decisions on antibiotic use should not be based solely on Procalcitonin (PCT) levels  If PCT is low but uncertainty exists with stopping antibiotics, repeat PCT in 6-24 hours to confirm the low level   If antibiotics are administered (regardless if initial PCT was high or low), repeat PCT every 1-2 days to consider early antibiotic cessation (when GREATER than 80% decrease from the peak OR when PCT drops below designated cutoffs, whichever comes first), so long as the infection is NOT one that typically requires prolonged treatment durations (e g , bone/joint infections, endocarditis, Staph  aureus bacteremia)  Situations of FALSE-POSITIVE Procalcitonin values:  1) Newborns < 67 hours old  2) Massive stress from severe trauma / burns, major surgery, acute pancreatitis, cardiogenic / hemorrhagic shock, sickle cell crisis, or other organ perfusion abnormalities  3) Malaria and some Candidal infections  4) Treatment with agents that stimulate cytokines (e g , OKT3, anti-lymphocyte globulins, alemtuzumab, IL-2, granulocyte transfusion [NOT GCSFs])  5) Chronic renal disease causes elevated baseline levels (consider GREATER than 0 75 ng/mL as an abnormal cut-off); initiating HD/CRRT may cause transient decreases  6) Paraneoplastic syndromes from medullary thyroid or SCLC, some forms of vasculitis, and acute pltlm-rm-jdap disease    Situations of FALSE-NEGATIVE Procalcitonin values:  1) Too early in clinical course for PCT to have reached its peak (may repeat in 6-24 hours to confirm low level)  2) Localized infection WITHOUT systemic (SIRS / sepsis) response (e g , an abscess, osteomyelitis, cystitis)  3) Mycobacteria (e g , Tuberculosis, MAC)  4) Cystic fibrosis exacerbations     LIPID PANEL WITH DIRECT LDL REFLEX - Normal    Cholesterol 134  See Comment mg/dL Final    Comment: Cholesterol:         Pediatric <18 Years        Desirable          <170 mg/dL      Borderline High    170-199 mg/dL      High               >=200 mg/dL        Adult >=18 Years            Desirable         <200 mg/dL      Borderline High   200-239 mg/dL      High              >239 mg/dL      Triglycerides 51  See Comment mg/dL Final    Comment: Triglyceride:     0-9Y            <75mg/dL     10Y-17Y         <90 mg/dL       >=18Y     Normal          <150 mg/dL     Borderline High 150-199 mg/dL     High            200-499 mg/dL        Very High       >499 mg/dL    Specimen collection should occur prior to N-Acetylcysteine or Metamizole administration due to the potential for falsely depressed results      HDL, Direct 49  >=40 mg/dL Final    Comment: Specimen collection should occur prior to Metamizole administration due to the potential for falsley depressed results  LDL Calculated 75  0 - 100 mg/dL Final    Comment: LDL Cholesterol:     Optimal           <100 mg/dl     Near Optimal      100-129 mg/dl     Above Optimal       Borderline High 130-159 mg/dl       High            160-189 mg/dl       Very High       >189 mg/dl         This screening LDL is a calculated result  It does not have the accuracy of the Direct Measured LDL in the monitoring of patients with hyperlipidemia and/or statin therapy  Direct Measure LDL (JDX680) must be ordered separately in these patients  Final Diagnosis:  1  Elevated troponin    2  Pleural effusion    3  SOB (shortness of breath)    4  Hyperlipidemia, unspecified hyperlipidemia type    5  Benign essential hypertension    6  CHF (congestive heart failure) ([facility] [address] )    7  Diabetes mellitus with peripheral circulatory disorder, controlled ([facility] [address] )    8   Shortness of breath        P:  - hospital tx includes   Medications   cefTRIAXone (ROCEPHIN) IVPB (premix in dextrose) 2,000 mg 50 mL (0 mg Intravenous Stopped 2/23/22 0247)   iohexol (OMNIPAQUE) 350 MG/ML injection (SINGLE-DOSE) 85 mL (85 mL Intravenous Given 2/23/22 0257)   furosemide (LASIX) injection 40 mg (40 mg Intravenous Given 2/23/22 0351)   aspirin chewable tablet 324 mg (324 mg Oral Given 2/23/22 3283)   heparin (porcine) injection 4,000 Units (4,000 Units Intravenous Given 2/23/22 0711)   perflutren lipid microsphere (DEFINITY) injection (0 8 mL/min Intravenous Given 2/23/22 1126)   lidocaine 1% buffered (8 mL Infiltration Given 2/23/22 1159)         - disposition  Time reflects when diagnosis was documented in both MDM as applicable and the Disposition within this note     Time User Action Codes Description Comment    2/23/2022  5:59 AM Margie Handler Add [R77 8] Elevated troponin     2/23/2022  5:59 AM Margie Handler Add [J90] Pleural effusion     2/23/2022  6:00 AM Neto Anastacia Add [R06 02] SOB (shortness of breath)     2/23/2022  6:45 PM Murrel Graces Add [E78 5] Hyperlipidemia, unspecified hyperlipidemia type     2/23/2022  6:45 PM Murrel Graces Add [I10] Benign essential hypertension     2/23/2022  6:45 PM Murrel Graces Add [I50 9] CHF (congestive heart failure) ([facility] [address] )     2/23/2022  6:45 PM Murrel Graces Add [E11 51] Diabetes mellitus with peripheral circulatory disorder, controlled ([facility] [address] )     2/23/2022  6:45 PM Murrel Graces Add [R06 02] Shortness of breath       ED Disposition     None      MD Documentation      Most Recent Value   Patient Condition The patient has been stabilized such that within reasonable medical probability, no material deterioration of the patient condition or the condition of the unborn child(brandon) is likely to result from the transfer   Reason for Transfer Patient/Family request, Level of Care needed not available at this facility  [facility] need for further cardiac workup based on ischemic workup]   Benefits of Transfer Patient preference   Risks of Transfer Potential for delay in receiving treatment, Potential deterioration of medical condition, Loss of IV, Increased discomfort during transfer, Possible worsening of condition or death during transfer   Accepting Physician Dr Parul Haddad, Dr Garcia [facility] Name, [facility]   Sending MD Dr Dread Chan, Dr Weston Blandon   Provider Certification General risk, such as traffic hazards, adverse weather conditions, rough terrain or turbulence, possible failure of equipment (including vehicle or aircraft), or consequences of actions of persons outside the control of the transport personnel, Unanticipated needs of medical equipment and personnel during transport, Risk of worsening condition, The possibility of a transport vehicle being unavailable      RN Documentation      Most [facility] Name, [address] [place]      Follow-up Information     Follow up With Specialties Details Why Contact Info    Marcie Norris MD Internal Medicine, Family Medicine Schedule an appointment as soon as possible for a visit  [facility] [address]      Mick Pitt, [address], [address] [place] [address] Drive  [address] [facility]  [phone]      cardiology  Follow up            - patient will call their PCP to let them know they were in the emergency department  We discuss return precautions       - additional tx intended, if consistent with primary provider:  - patient to follow with :      Discharge Medication List as of 2/23/2022  7:19 PM      START taking these medications    Details   aspirin (ECOTRIN LOW STRENGTH) 81 mg EC tablet Take 1 tablet (81 mg total) by mouth daily, Starting Thu 2/24/2022, No Print      atorvastatin (LIPITOR) 80 mg tablet Take 1 tablet (80 mg total) by mouth daily with dinner, Starting Thu 2/24/2022, No Print      furosemide (LASIX) 10 mg/mL Infuse 4 mL (40 mg total) into a venous catheter 2 (two) times a day, Starting Wed 2/23/2022, No Print      !! Heparin Sodium, Porcine, (heparin, porcine,) 1,000 units/mL Infuse 2 mL (2,000 Units total) into a venous catheter every hour as needed (Therapeutic Heparin: PTT 43 - 59 seconds), Starting Wed 2/23/2022, No Print      !!  Heparin Sodium, Porcine, (heparin, porcine,) 1,000 units/mL Infuse 4 mL (4,000 Units total) into a venous catheter every hour as needed (Therapeutic Heparin: PTT less than or equal to 42 seconds), Starting Wed 2/23/2022, No Print      heparin, porcine, 83340-1 45 UT/250ML-% Infuse 240-1,600 Units/hr into a venous catheter titrated, Starting Wed 2/23/2022, No Print      !! insulin lispro (HumaLOG) 100 units/mL injection Inject 1-5 Units under the skin daily at bedtime, Starting Wed 2/23/2022, No Print      !! insulin lispro (HumaLOG) 100 units/mL injection Inject 1-6 Units under the skin 3 (three) times a day before meals, Starting Thu 2/24/2022, No Print      ipratropium-albuterol (DUO-NEB) 0 5-2 5 mg/3 mL nebulizer solution Take 3 mL by nebulization every 6 (six) hours as needed for wheezing or shortness of breath, Starting Wed 2/23/2022, No Print      metoprolol tartrate (LOPRESSOR) 25 mg tablet Take 1 tablet (25 mg total) by mouth every 12 (twelve) hours, Starting Wed 2/23/2022, No Print       !! - Potential duplicate medications found  Please discuss with provider  CONTINUE these medications which have CHANGED    Details   lisinopril (ZESTRIL) 20 mg tablet Take 1 tablet (20 mg total) by mouth daily, Starting Thu 2/24/2022, No Print         CONTINUE these medications which have NOT CHANGED    Details   ! ! B-D UF III MINI PEN NEEDLES 31G X 5 MM MISC USE AS DIRECTED, Normal      CHUYITA MICROLET LANCETS lancets by Does not apply route, Starting Thu 12/10/2009, Historical Med      !! Insulin Pen Needle 31G X 5 MM MISC BD Ultra-Fine Mini Pen Needle 31 gauge x 3/16", Historical Med      Lantus SoloStar 100 units/mL injection pen INJECT 68 UNITS UNDER THE SKIN DAILY, Starting Mon 12/13/2021, Normal       !! - Potential duplicate medications found  Please discuss with provider  STOP taking these medications       amLODIPine (NORVASC) 2 5 mg tablet Comments:   Reason for Stopping:         glucose blood (CHUYITA CONTOUR TEST) test strip Comments:   Reason for Stopping:         rosuvastatin (CRESTOR) 20 MG tablet Comments:   Reason for Stopping:         sildenafil (VIAGRA) 100 mg tablet Comments:   Reason for Stopping:             Outpatient Discharge Orders   Discharge Diet     Call provider for: active or persistent bleeding     Call provider for:  severe uncontrolled pain     Call provider for:  persistent dizziness or light-headedness     Transfer to other facility     Prior to Admission Medications   Prescriptions Last Dose Informant Patient Reported? Taking?    B-D UF III MINI PEN NEEDLES 31G X 5 MM MISC   No No   Sig: USE AS DIRECTED   CHUYITA MICROLET LANCETS lancets   Yes No   Sig: by Does not apply route   Insulin Pen Needle 31G X 5 MM MISC   Yes No   Sig: BD Ultra-Fine Mini Pen Needle 31 gauge x 3/16"   Lantus SoloStar 100 units/mL injection pen 2/22/2022 at Unknown time  No Yes   Sig: INJECT 68 UNITS UNDER THE SKIN DAILY   amLODIPine (NORVASC) 2 5 mg tablet 2/22/2022 at Unknown time  No Yes   Sig: TAKE 1 TABLET BY MOUTH EVERY DAY   glucose blood (CHUYITA CONTOUR TEST) test strip   Yes No   Sig: by In Vitro route   lisinopril (ZESTRIL) 40 mg tablet 2/22/2022 at Unknown time  No Yes   Sig: Take 1 tablet (40 mg total) by mouth daily   rosuvastatin (CRESTOR) 20 MG tablet 2/22/2022 at Unknown time  No Yes   Sig: TAKE 1 TABLET BY MOUTH EVERY DAY   sildenafil (VIAGRA) 100 mg tablet   Yes No   Sig: Take by mouth      Facility-Administered Medications: None       Portions of the record may have been created with voice recognition software  Occasional wrong word or "sound a like" substitutions may have occurred due to the inherent limitations of voice recognition software  Read the chart carefully and recognize, using context, where substitutions have occurred      Electronically signed by:  Donelda Rundle, MD Allan Aschoff, MD  02/24/22 4744

## 2022-02-23 NOTE — APP STUDENT NOTE
CROW STUDENT  Inpatient Progress Note for TRAINING ONLY  Not Part of Legal Medical Record       Progress Note - William Boyle 60 y o  male MRN: [id number]    Unit/Bed#: [address] [place] [address] Encounter: [id number]      Assessment:  1  SOB  2  Elevated troponin  3  B/l pleural effusions  4  Luekocytosis  5  Primary (Essential) HTN  6  Type 2 DM  7  PAD   8  S/P left below the knee ambulation  9  Dyslipidemia     Plan:  1  Patient presented to the ER with increased SOB  D-dimer was 3 25  CTA was negative for PE  NT-proBNP was elevated at 3,146  Venous blood gas revealed respiratory acidosis with no compensation  Lactic acid and procalcitonin were normal     -Placed on BiPAP in ER due to O2 sat of 50%  -Was weaned down to 2-4 L oxygen by nasal  cannula    -Was given 324 mg of ASA, heparin bolus, 40mg  IV Lasix, and a dose of Rocephin in ED   -Arterial blood gas now shows fully compensated  respiratory acidosis    -ECG showed ST and T-wave abnormality in the  lateral leads   -Cardiology was consulted    -ECHO showed mild to moderate aortic stenosis  and EF of 35%   -Continue with aspirin 81mg daily, atorvastatin  80mg daily, Lasix 40 mg IV BID, metoprolol 25 mg  Q12h, and heparin drip    -Decreased lisinopril to 20 mg daily starting 2/24    -Lipitor was increased to 80 mg daily    -Continue DuoNebs    -48 hour telemetry   -Monitor I/O, daily weight    -2 gram Na restriction and 1500 mL fluid restriction   -CBC and BMP in AM   -Blood cultures pending   2  Troponin 0 hr was 42, Troponin 2hr was 422   -Ischemia vs CHF   -Cardiology consulted    -Random HS Troponin ordered to trend   3  CTA revealed large b/l pleural effusions    -Secondary to CHF vs infectious vs inflammatory   -Pulmonology was consulted    -Thoracentesis was done 2/23    -LDH, culture, WBC count, glucose, total protein,  cytology, pH, and body fluid diff were ordered   4  Leukocytosis of 12 71 in ED   -Most likely secondary to stress    -Repeat CBC in AM  5  Decreased lisinopril to 20 mg daily starting on 2/24  Started metoprolol 25 mg q12h      -D/c Norvasc   6  HgbA1C of 6 5  Currently managed at home with 68 units of lantus  Not well-controlled  Not currently compliant with diabetic diet  No episodes of hypoglycemia     -Continue home insulin with SSI, consider  lowering home insulin since will be on diabetic  diet  Monitor glucose levels    -Consistent carbohydrate diet   7  VAS of right lower limb on 10/14/20 showed diffuse arterial occlusive disease noted at the tibioperoneal trunk  Evidence of a high-grade stenosis vs occlusion of the mid posterior tibial artery  Ankle/Brachial index: 0 88     -Secondary to DM   8  Left bka performed due to uncontrolled infection of left lower extremity    -Noted  9  Dyslipidemia   -Lipitor was increased to 80 mg daily  Subjective:   Patient reports his SOB has greatly improved since thoracentesis  Reports mild "crampy" pain where they did the thoracentesis  Reports having 2/10 "pulling" chest pain last night that has since resolved  Also notes having BROCK and orthopnea prior to admission  Patinet also noticed abdominal distension and swelling in right LE  Denies any dizziness, abdominal pain, or N/V/D  Reports having a dry cough for awhile, which they think is due to their blood pressure medication  Urinating without difficulty  Currently on oxygen via nasal cannula  Objective:     Vitals: Blood pressure 113/70, pulse 74, temperature 98 3 °F (36 8 °C), resp  rate 18, height 5' 7" (1 702 m), weight 79 8 kg (176 lb), SpO2 99 %  ,Body mass index is 27 57 kg/m²        Intake/Output Summary (Last 24 hours) at 2/23/2022 1518  Last data filed at 2/23/2022 1213  Gross per 24 hour   Intake 50 ml   Output 4200 ml   Net -4150 ml       Physical Exam: General appearance: alert and oriented, in no acute distress  Head: Normocephalic, without obvious abnormality, atraumatic  Eyes: negative findings: lids and lashes normal and conjunctivae and sclerae normal  Ears: External ears without abnormality  Nose: no discharge  Throat: MMM  Lungs: crackles and normal pumlonary effort  No increased work of breathing   Heart: regular rate and rhythm and Murmur present   Abdomen: Soft, non-tender  Distension is present  Extremities: Extremities warm and well perfused  No pitting edema    Invasive Devices  Report    Peripheral Intravenous Line            Peripheral IV 02/23/22 Dorsal (posterior); Right Wrist <1 day    Peripheral IV 02/23/22 Left Antecubital <1 day    Peripheral IV 02/23/22 Left Hand <1 day                Lab, Imaging and other studies: I have personally reviewed pertinent reports  VTE Pharmacologic Prophylaxis: Reason for no pharmacologic prophylaxis On Heparin Drip   VTE Mechanical Prophylaxis: reason for no mechanical VTE prophylaxis on heparin drip   Left BKA

## 2022-02-23 NOTE — ED NOTES
Patient Heparin prepared, patient weighed as well, no IV pump available in department for administration  Nursing Supervisor notified at this time  Patient pending medication and admission        Amandase Park, RYAN  02/23/22 [id number]

## 2022-02-23 NOTE — [place] UNIVERSAL TRANSFER FORM
[place] UN[place] TRANSFER FORM  (ALL ITEMS MUST BE COMPLETED)    1  TRANSFER FROM: [address] [facility]      TRANSFER TO: [facility]    2  DATE OF TRANSFER: 2/23/2022                        TIME OF TRANSFER: 1900    3  PATIENT NAME: PAMELA Lewis      YOB: 1965                             GENDER: male    4  LANGUAGE:   English    5  PHYSICIAN NAME:  Caroline Herr MD                   PHONE: [phone]    6  CODE STATUS: No Order        Out of Hospital DNR Attached: No    7  :                                      :  Extended Emergency Contact Information  Primary Emergency Contact: Lakeshia Mcfadden  Mobile Phone: [phone]  Relation: Daughter           Health Care Representative/Proxy:  yes           Legal Guardian:  no             NAME OF:           HEALTH CARE REPRESENTATIVE/PROXY:                                         OR           LEGAL GUARDIAN, IF NOT :                                               PHONE:  (Day)           (Night)                        (Cell)    8  REASON FOR TRANSFER: Higher level of care  V/S: /62   Pulse 62   Temp (!) 97 3 °F (36 3 °C)   Resp 18   Ht 5' 7" (1 702 m)   Wt 79 8 kg (176 lb)   SpO2 96%   BMI 27 57 kg/m²           PAIN: No    9  PRIMARY DIAGNOSIS: Shortness of breath      Secondary Diagnosis:         Pacemaker: No     Internal Defib: No          Mental Health Diagnosis (if Applicable): No    10  RESTRAINTS: No     11  RESPIRATORY NEEDS:  Pt on room air  12  ISOLATION/PRECAUTION:  None    13  ALLERGY: Bee venom and Penicillins    14  SENSORY: None           15  SKIN CONDITION: scratches to right lower leg    16  DIET: Cardiac diet 1500 ml fluid restriction     17  IV ACCESS: yes    18  PERSONAL ITEMS SENT WITH PATIENT: Clothing,cellular phone,  back pack    19  ATTACHED DOCUMENTS: AVS document    20   AT RISK ALERTS:Fall risk        HARM TO: No    21  WEIGHT BEARING STATUS:         Left Leg: Left BKA        Right Leg:  Full weight    22  MENTAL STATUS:Alert and oriented x's 4    23  FUNCTION:        Walk: with assistance        Transfer: with assistance        Toilet: with assistance        Feed: self    24  IMMUNIZATIONS/SCREENING:     Immunization History   Administered Date(s) Administered    Influenza Quadrivalent Preservative Free 3 years and older IM 10/09/2014    Influenza Quadrivalent, 6-35 Months IM 10/01/2016    Influenza Split 10/01/2014    Influenza, recombinant, quadrivalent,injectable, preservative free 12/10/2018, 11/18/2021    Pneumococcal Polysaccharide PPV23 1965       25  BOWEL: 2/23/2022    26  BLADDER: Void    27   SENDING FACILITY CONTACT: Aries Maharaj RN                 Title: RN        Unit: [address] [facility]        Phone: [phone] [address] (if known): Porfirio Lemus         Title:        Unit:         Phone:         FORM PREFILLED BY (if applicable)       Title:       Unit:        Phone:         FORM COMPLETED BY Jessica Pang RN      Title: RN      Phone: [phone]

## 2022-02-23 NOTE — BRIEF OP NOTE (RAD/CATH)
INTERVENTIONAL RADIOLOGY PROCEDURE NOTE    Date: 2/23/2022    Procedure: bilateral thoracentesis    Preoperative diagnosis:   1  Elevated troponin    2  Pleural effusion    3  SOB (shortness of breath)         Postoperative diagnosis: Same  Surgeon: Terrance Silva MD     Assistant: None  No qualified resident was available  Blood loss: 0 ml    Specimens: sent to lab     Findings: b/l thoracentesis  Complications: None immediate      Anesthesia: local

## 2022-02-23 NOTE — ED PROCEDURE NOTE
PROCEDURE  CriticalCare Time  Performed by: Ciera Negrete MD  Authorized by: Ciera Negrete MD     Critical care provider statement:     Critical care time (minutes):  51    Critical care was necessary to treat or prevent imminent or life-threatening deterioration of the following conditions:  Respiratory failure    Critical care was time spent personally by me on the following activities:  Blood draw for specimens, obtaining history from patient or surrogate, discussions with consultants, evaluation of patient's response to treatment, examination of patient, review of old charts, re-evaluation of patient's condition, ordering and review of laboratory studies, ordering and review of radiographic studies and ordering and performing treatments and interventions         Ciera Negrete MD  02/24/22 7784

## 2022-02-23 NOTE — ASSESSMENT & PLAN NOTE
Etiology ?    Cardiac/ infectious/ malignancy   Patient non smoker   Large bilateral   pulm consulted for diagnostic and therapeutic thoracocentesis

## 2022-02-23 NOTE — QUICK NOTE
Progress Note - Triage Assessment   Micah Awan 60 y o  male MRN: [id number]    Time Called: 6641  Date Called: 02/23/22  Room#: ED 6  Time Evaluated: 0430  Person requesting evaluation: Dr Sukhdev Jimenez to ED to evaluate patient for possible admission to Critical Care Service, chart reviewed and patient evaluated  Case discussed with Critical Care attending Dr Negron and after review it was felt the patient is appropriate for admission to a general medical floor w/ telemetry  Patient initially on bipap and during my evaluation was removed and placed on NC  Started on 4L and weaned to 2L with sats 96-99%  Patient states his breathing feels back to normal  He does state he has increased swelling of his RLE and abd  He denies having Covid but per ED physician his daughter reported he was positive in January  Dr Vladimir Castellanos discussed case with Dr Erika Cheek and they have agreed to accept patient to their service  Recommendations discussed with ED attending Dr Vladimir Castellanos          Triage Assessment:     Patient appropriate to be admitted to med-surg level of care  If any questions or concerns please call the critical care team at ext [phone]

## 2022-02-23 NOTE — ASSESSMENT & PLAN NOTE
- saturating 50s on arrival   - improved with bipap and gradually weaned to nasal canula 2-4 litres saturating above 90%   - CTA chest with bilateral large pleural effusions and Bilateral patchy groundglass opacities with interlobular septal thickening suspicious for pulmonary edema    - D dimer elevated above 2 but CTA chest negative for pulmonary embolism   - elevated troponin / no chest pain     Etiology sec to bilateral pleural effusions +/- cardiac   As per ER attnd patient's daughter revealed he has had covid in [place]  covid screen in ER negative today     Started on heparin gtt   S/p one dose rocephin in ER   Septic work up   Echo   pulm consult for diagnostic and therapeutic thoracocentesis   and cardiology consult for elevated troponin

## 2022-02-23 NOTE — ED NOTES
Patient removed from BiPAP by CHRISTOPHER Cunningham @ this time, patient placed on 4L NC and tolerating well, able to converse in full sentences  Patient to remain off BiPAP for approx 30-45min prior to obtaining ABG        Siobhan Cohn RN  02/23/22 1366

## 2022-02-23 NOTE — CASE MANAGEMENT
Case Management Assessment & Discharge Planning Note    Patient name Chucho Lawrence  Location [address] [place] 5-* MRN [id number]  : 1965 Date 2022       Current Admission Date: 2022  Current Admission Diagnosis:Shortness of breath   Patient Active Problem List    Diagnosis Date Noted    Shortness of breath 2022    Elevated troponin 2022    Pleural effusion, bilateral 2022    Type 2 diabetes mellitus, with long-term current use of insulin ([facility] ) 2022    Ulcer of right heel, with fat layer exposed ([facility] ) 2020    Unilateral complete BKA ([facility] ) 2020    Diabetes mellitus with ulcer of foot ([facility] ) 2020    Pressure ulcer of BKA stump, stage 2 ([place] [address] ) 2020    BMI 29 0-29 9,adult 2019    Gastroesophageal reflux disease without esophagitis 2018    Diabetes mellitus with peripheral circulatory disorder, controlled ([facility] ) 2015    [facility] below knee ([facility] ) 10/22/2015    Male erectile disorder 2014    PVD (peripheral vascular disease) ([facility] ) 2014    Hyperlipidemia 2013    Anxiety disorder 2012    Benign essential hypertension 2012      LOS (days): 0  Geometric Mean LOS (GMLOS) (days):   Days to GMLOS:     OBJECTIVE:    Risk of Unplanned Readmission Score: 8   Bundled Patient Payment: No Current Bundle     Current admission status: Inpatient  Referral Reason: Other (Discharge planning)    Preferred Pharmacy:   [facility],  [facility] [place] [id number]  [address] [place] [address]  Phone: [phone] Fax: Jorge Col[address]res [address] [facility], [address] [place] [address]  Phone: [phone] Fax: [phone]    Primary Care Provider: Sammie Plaza MD    Primary Insurance: BLUE CROSS  Secondary Insurance:     ASSESSMENT:  Active Health Care Agents    There are no active Health Care Agents on file  Readmission Root Cause  30 Day Readmission: No    Patient Information  Admitted from[de-identified] Home  Mental Status: Alert  During Assessment patient was accompanied by: Other-Comment (ex spouse Chon Ortiz in room but on phone during this assessment)  Assessment information provided by[de-identified] Patient  Primary Caregiver: Self  Support Systems: Daughter,Other (Comment) (ex spouse Chon Ortiz)  [place] of Residence: [address] [place] [address] do you live in?: [address] entry access options   Select all that apply : No steps to enter home  Type of Current Residence: Apartment  Floor Level: 1  Upon entering residence, is there a bedroom on the main floor (no further steps)?: Yes  Upon entering residence, is there a bathroom on the main floor (no further steps)?: Yes  In the last 12 months, was there a time when you were not able to pay the mortgage or rent on time?: No  In the last 12 months, was there a time when you did not have a steady place to sleep or slept in a shelter (including now)?: No  Homeless/housing insecurity resource given?: N/A  Living Arrangements: Lives Alone  Is patient a ?: No    Activities of Daily Living Prior to Admission  Functional Status: Independent  Completes ADLs independently?: Yes  Ambulates independently?: Yes  Does patient use assisted devices?: No  Does patient currently own DME?: No  Does the patient have a history of Short-Term Rehab?: Yes (Patient had STR post left BKA years ago, believes it was at [facility] )  Does patient have a history of HHC?: Yes (Patient cannot remember name of agency, had [facility] post left BKA years ago)  Does patient currently have [facility]?: No    Patient Information Continued  Income Source: Employed  Does patient have prescription coverage?: Yes  Within the past 12 months, you worried that your food would run out before you got the money to buy more : Never true  Within the past 12 months, the food you bought just didnt last and you didnt have money to get more : Never true  Food insecurity resource given?: N/A  Does patient receive dialysis treatments?: No  Does patient have a history of substance abuse?: No  Does patient have a history of Mental Health Diagnosis?: No    Means of Transportation  Means of Transport to Appts[de-identified] Drives Self  In the past 12 months, has lack of transportation kept you from medical appointments or from getting medications?: No  In the past 12 months, has lack of transportation kept you from meetings, work, or from getting things needed for daily living?: No  Was application for public transport provided?: N/A    DISCHARGE DETAILS:    Discharge planning discussed with[de-identified] Patient  Freedom of Choice: Yes  Comments - Freedom of Choice: SW reviewed [address] with patient and will discuss further when post-acute recommendations are known     SW spoke with patient at bedside to introduce role of CM and obtain assessment information  Patient's ex-wife Andie Mariee was in the room but was on the phone and did not participate in the conversation  He provided her number [phone] as an additional contact  Patient lives alone in a first-floor apartment and is independent with all needs at baseline  He works full-time and drives  He has a left BKA and uses a prosthesis  He reported a history of STR and HHC post-BKA but does not remember the names of the facility or agency  JOANNE confirmed Dr Kiran Mckeon as patient's PCP and [facility] as his preferred pharmacy  Patient has not been medically cleared for discharge  SW will continue to follow for discharge planning needs

## 2022-02-24 NOTE — CONSULTS
Consultation - Pulmonary Medicine   Kiko Andrade 60 y o  male MRN: [id number]  Unit/Bed#: [address] Encounter: [id number]      Assessment and Plan:    1  Acute hypoxic respiratory failure:  He has acute hypoxic respiratory failure currently needing oxygen supplementation at 2 liters/minute  He is saturating well  Dimitri Ingles He initially required BiPAP and his ABG showed CO2 retention  2  Congestive heart failure: He was found to have acute congestive heart failure and was diuresed with Lasix his subsequent clinical and symptomatic improvement  His echocardiogram showed ejection fraction of 35-40% and moderate aortic stenosis  Also had mild to moderate mitral regurgitation  Cardiology is following him  He is on treatment with furosemide lisinopril and metoprolol  The etiology of this congestive heart failure is not clear at this point but could be secondary to cardiomyopathy  This could be post viral   He was not vaccinated against COVID  His family members were sick with Devon in January  The patient is to be transferred to [facility] for further cardiac workup at family request     3  Bilateral pleural effusions:  He was found to have bilateral moderate pleural effusions and subsequently underwent drainage by IR today  1400 mL was drained from the left side and 1350 from the right side  He is feeling better after drainage  The preliminary results favor transudative effusion  Spoke to the patient at length  Answered all questions  Thank you for allowing me to participate in the care of the patient  History of Present Illness      Physician Requesting Consult: Janiya Fletcher MD     Reason for Consult / Principal Problem:  Shortness of breath; respiratory failure; pleural effusions  Hx and PE limited by: none      HPI: Kiok Andrade is a 60y o  year old male with past medical history of diabetes hypertension hyperlipidemia status post left BKA who presented with worsening shortness of breath since few days  He was found to be hypoxemic in the ER and was placed on BiPAP support  He was found to be in congestive heart failure and was diuresed with Lasix  He subsequently improved and was transitioned to nasal cannula oxygen  He has no previous history of asthma or COPD  He was a smoker for about 10 years 1 pack per day, but quit many years back  He is not on any oxygen or inhaler at home  He has no cough or phlegm or wheezing or chest pain  He had elevated D-dimer  He underwent a CT angiogram which was negative for pulmonary embolism  He had elevated troponin  He had bilateral thoracentesis today by IR   1400 mL was drained from the left and 1350 from the right  The patient is currently feeling better  Consults    Review of Systems   Constitutional: Positive for activity change  Negative for appetite change, chills, fatigue and fever  HENT: Negative for hearing loss, rhinorrhea and sore throat  Respiratory: Positive for shortness of breath  Negative for cough, chest tightness, wheezing and stridor  Cardiovascular: Positive for leg swelling (Right leg)  Negative for chest pain and palpitations  Gastrointestinal: Negative for abdominal pain, diarrhea, nausea and vomiting  Endocrine: Positive for polyuria  Genitourinary: Positive for frequency  Negative for dysuria and urgency  Musculoskeletal: Positive for gait problem  Negative for arthralgias  Skin: Negative for rash  Allergic/Immunologic: Positive for environmental allergies  Neurological: Negative for dizziness, syncope, light-headedness and headaches  Psychiatric/Behavioral: Negative for agitation and sleep disturbance  The patient is nervous/anxious          Historical Information   Past Medical History:   Diagnosis Date    Diabetes mellitus ([address] )     Hyperlipidemia     Hypertension      Past Surgical History:   Procedure Laterality Date    BELOW KNEE LEG AMPUTATION      IR THORACENTESIS 2022     Social History   Social History     Substance and Sexual Activity   Alcohol Use Not Currently     Social History     Substance and Sexual Activity   Drug Use Never     E-Cigarette/Vaping    E-Cigarette Use Never User      E-Cigarette/Vaping Substances    Nicotine No     THC No     CBD No     Flavoring No     Other No     Unknown No      Social History     Tobacco Use   Smoking Status Former Smoker    Quit date:     Years since quittin 1   Smokeless Tobacco Never Used     Occupational History:  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  No history of exposure to asbestos    Family History: non-contributory  No history of asthma or COPD or lung cancer    Meds/Allergies   all current active meds have been reviewed    Allergies   Allergen Reactions    Bee Venom Anaphylaxis     Reaction Date: 2005;     Penicillins Rash     Reaction Date: 2005; Objective   Vitals: Blood pressure 113/62, pulse 62, temperature (!) 97 3 °F (36 3 °C), resp  rate 18, height 5' 7" (1 702 m), weight 79 8 kg (176 lb), SpO2 96 %  ,Body mass index is 27 57 kg/m²  Intake/Output Summary (Last 24 hours) at 2022  Last data filed at 2022 1639  Gross per 24 hour   Intake 50 ml   Output 4500 ml   Net -4450 ml     Invasive Devices  Report    Peripheral Intravenous Line            Peripheral IV 22 Dorsal (posterior); Right Wrist <1 day    Peripheral IV 22 Left Antecubital <1 day    Peripheral IV 22 Left Hand <1 day                Physical Exam  Vitals reviewed  Constitutional:       General: He is not in acute distress  Appearance: He is not ill-appearing or toxic-appearing  HENT:      Head: Normocephalic  Neck:      Thyroid: No thyromegaly  Vascular: JVD present  Trachea: No tracheal deviation  Cardiovascular:      Rate and Rhythm: Normal rate  Heart sounds: Murmur (Systolic murmur lower left sternal border, conducted to the carotid) heard  Pulmonary:      Breath sounds: Examination of the right-lower field reveals rales  Examination of the left-lower field reveals rales  Rales (Bibasilar occasional inspiratory crackles) present  No decreased breath sounds  Chest:      Chest wall: No deformity or tenderness  Abdominal:      General: Bowel sounds are normal       Palpations: Abdomen is soft  Tenderness: There is no abdominal tenderness  There is no guarding  Musculoskeletal:      Right lower leg: Edema (Right lower extremity) present  Left lower leg: No edema  Comments: Left BKA   Lymphadenopathy:      Cervical: No cervical adenopathy  Skin:     Coloration: Skin is not cyanotic or pale  Findings: No rash  Nails: There is no clubbing  Neurological:      Mental Status: He is alert and oriented to person, place, and time  Psychiatric:         Mood and Affect: Mood is not anxious  Behavior: Behavior is not agitated  Lab Results: I have personally reviewed pertinent lab results  Elevated BNP  Pleural fluid protein 2 3  Elevated troponin P ABG showed a pCO2 50 3  Creatinine normal   Blood cultures were negative  Imaging Studies: I have personally reviewed pertinent films in PACS  The CT scan showed bilateral moderate pleural effusions and pulmonary vascular congestion  EKG, Pathology, and Other Studies: I have personally reviewed pertinent films in PACS    Previous EKG showed sinus rhythm  VTE Prophylaxis: Heparin    Code Status: No Order  Advance Directive and Living Will:      Power of :    POLST:      None

## 2022-02-24 NOTE — ASSESSMENT & PLAN NOTE
- sec to DM   S/p left BKA 4 years ago   Sees podiatry and recently saw them for right heel ulcer   No fever or s/s to suggest active infection

## 2022-02-24 NOTE — ASSESSMENT & PLAN NOTE
Initially required BiPAP in ED  Now improved  Monitor respiratory status  Diuresis as tolerated  Continue supplemental oxygen

## 2022-02-24 NOTE — UTILIZATION REVIEW
Initial Clinical Review    Admission: Date/Time/Statement:   Admission Orders (From admission, onward)     Ordered        02/23/22 0608  INPATIENT ADMISSION  Once                      Orders Placed This Encounter   Procedures    INPATIENT ADMISSION     Standing Status:   Standing     Number of Occurrences:   1     Order Specific Question:   Level of Care     Answer:   Med Surg [16]     Order Specific Question:   Estimated length of stay     Answer:   More than 2 Midnights     Order Specific Question:   Certification     Answer:   I certify that inpatient services are medically necessary for this patient for a duration of greater than two midnights  See H&P and MD Progress Notes for additional information about the patient's course of treatment  ED Arrival Information     Expected Arrival Acuity    - 2/23/2022 01:36 Emergent         Means of arrival Escorted by Service Admission type    Ambulance [facility] Emergency         Arrival complaint    Shortness of breath        Chief Complaint   Patient presents with    Shortness of Breath     Patient brought by ems for SOBx2 days  On arrival patient labored breathing skin dusky, cool and clammy  Patient unable to speak in full [facility]  EDMD Dr William Marinelli brought to bedside  Initial Presentation:   60 yom to ER from home via EMS c/o SOB since COVID in January, increased past 2 days  Hx DM, HLD, HTN  Presents tachypneic & unable to speak in full sentences, hypoxic in 50's on 6ltr, hypotensive, gray, diaphoretic, with increased WOB  Admission work-up showing elevated d-dimer, BNP, +tropronin, pulm edema & bilateral pleural effusion on imaging  Admitted to inpatient status for acute on chronic CHF  Placed on BIPAP & transitioned to nc  Per cardio: pulm edema with bilateral pleural effusions, continued IV Lasix, 2D echo, IV heparin gtt until echo completed  Bilateral thoracentesis: 1400cc red fluid from Left, 1350cc red fluid from Right   Specimens sent to lab  Transferred to [facility] for patient preference       ED Triage Vitals   Temperature Pulse Respirations Blood Pressure SpO2   02/23/22 0140 02/23/22 0140 02/23/22 0140 02/23/22 0140 02/23/22 0140   (!) 96 8 °F (36 °C) 98 (!) 40 99/70 (!) 56 %      Temp Source Heart Rate Source Patient Position - Orthostatic VS BP Location FiO2 (%)   02/23/22 0140 02/23/22 0140 02/23/22 0140 02/23/22 0140 02/23/22 0200   Tympanic Monitor Sitting Left arm 100      Pain Score       02/23/22 0140       No Pain          Wt Readings from Last 1 Encounters:   02/23/22 79 8 kg (176 lb)     Additional Vital Signs:   02/23/22 15:42:49 97 3 °F (36 3 °C) Abnormal  62 -- 113/62 79 96 % -- -- -- -- -- -- --   02/23/22 15:42:35 97 3 °F (36 3 °C) Abnormal  53 Abnormal  18 -- -- 99 % -- -- -- -- -- -- --   02/23/22 1331 -- -- -- -- -- 99 % -- 26 1 5 L/min 1 5 L/min Nasal cannula -- --   02/23/22 12:11:11 -- 74 18 113/70 -- 100 % -- -- -- -- -- -- --   02/23/22 12:00:16 -- 77 18 121/73 -- 98 % -- -- -- -- -- -- --   02/23/22 0941 -- 76 25 Abnormal  -- -- 93 % -- 26 1 5 L/min 1 5 L/min Nasal cannula -- --   02/23/22 0929 -- 82 -- 118/61 -- -- -- -- -- -- -- -- --   02/23/22 08:17:40 98 3 °F (36 8 °C) 76 20 118/61 80 89 % Abnormal  -- -- -- -- -- -- Sitting   02/23/22 0645 -- 81 31 Abnormal  -- -- 97 % -- -- -- -- None (Room air) -- --   02/23/22 0600 -- 77 32 Abnormal  109/61 80 96 % -- 36 -- 4 L/min Nasal cannula -- Sitting   02/23/22 0530 -- 74 32 Abnormal  106/57 77 95 % -- -- 4 L/min -- Nasal cannula -- Lying   02/23/22 0500 -- 85 35 Abnormal  110/70 85 95 % -- -- 4 L/min -- Nasal cannula -- Sitting   02/23/22 0430 -- 78 33 Abnormal  115/57 80 100 % 50 -- -- -- BiPAP -- Sitting   02/23/22 0400 -- 87 34 Abnormal  124/80 96 98 % 50 -- -- -- BiPAP -- Sitting   02/23/22 0330 -- 84 33 Abnormal  114/64 82 100 % 50 -- -- -- BiPAP -- Sitting   02/23/22 0315 -- 92 36 Abnormal  115/76 89 100 % 50 -- -- -- BiPAP -- Sitting   02/23/22 0230 -- 87 31 Abnormal  121/63 86 100 % 80 -- -- -- BiPAP -- Lying   02/23/22 0215 97 4 °F (36 3 °C) Abnormal  94 36 Abnormal  -- -- 100 % 80 -- -- -- BiPAP -- --   02/23/22 0200 -- 101 36 Abnormal  145/89 111 95 % 100 -- -- -- BiPAP -- Lying   02/23/22 0142 -- -- -- -- -- 100 % -- -- -- -- -- Face mask --   02/23/22 0140 96 8 °F (36 °C) Abnormal  98 40 Abnormal  99/70 -- 56 % Abnormal  -- 44 -- 6 L/min Nasal cannula -- Sitting       Pertinent Labs/Diagnostic Test Results:   2/23   CTA ED chest PE Study      No evidence of pulmonary embolus  Bilateral patchy groundglass opacities with interlobular septal thickening suspicious for pulmonary edema  Large bilateral pleural effusions  Ekg=  Rhythm: sinus  tach  Intervals: normal intervals  Axis: normal axis  QRS/Blocks: normal QRS  ST Changes: No acute ST Changes, no STD/ERICK  Lateral depression? T wave changes: none  Echo=    Left Ventricle: Left ventricular cavity size is normal  With definity/contrast basal inferiolateral and anteroseptum appear hypokinetic  Wall thickness is mildly increased  Systolic function is moderately reduced  Estimated ejection fraction is 35-40%  There is mild concentric hypertrophy  Diastolic dysfunction, grade indeterminate due to MAC    Right Ventricle: Systolic function is normal   TAPSE is 2 1 cm    Left Atrium: The atrium is severely dilated    Aortic Valve: The aortic valve is trileaflet  The leaflets are moderately thickened  The leaflets are moderately calcified  There is mild to moderately reduced mobility  There is mild to moderate stenosis  Mean/peak gradient is 14/29 mm Hg, VELIA is 1 4-1 5 cm², and DVI is 0 47     Mitral Valve: There is mild thickening  There is moderate annular calcification  There is mild to moderate regurgitation    Tricuspid Valve: There is mild regurgitation    Pulmonic Valve: There is mild regurgitation    Pericardium: There is a trivial pericardial effusion   There is no echocardiographic evidence of tamponade  Large pleural effusion is noted        Results from last 7 days   Lab Units 02/23/22  0205   SARS-COV-2  Negative     Results from last 7 days   Lab Units 02/23/22  0144   WBC Thousand/uL 12 71*   HEMOGLOBIN g/dL 12 4   HEMATOCRIT % 42 6   PLATELETS Thousands/uL 310   NEUTROS ABS Thousands/µL 10 32*     Results from last 7 days   Lab Units 02/23/22  0144   SODIUM mmol/L 144   POTASSIUM mmol/L 3 9   CHLORIDE mmol/L 107   CO2 mmol/L 31   ANION GAP mmol/L 6   BUN mg/dL 14   CREATININE mg/dL 0 92   EGFR ml/min/1 73sq m 91   CALCIUM mg/dL 8 1*     Results from last 7 days   Lab Units 02/23/22  0144   AST U/L 22   ALT U/L 32   ALK PHOS U/L 99   TOTAL PROTEIN g/dL 7 1   ALBUMIN g/dL 2 8*   TOTAL BILIRUBIN mg/dL 0 38     Results from last 7 days   Lab Units 02/23/22  1608 02/23/22  1310 02/23/22  0808   POC GLUCOSE mg/dl 248* 175* 221*     Results from last 7 days   Lab Units 02/23/22  0144   GLUCOSE RANDOM mg/dL 260*     Results from last 7 days   Lab Units 02/23/22  0144   HEMOGLOBIN A1C % 6 5*   EAG mg/dl 140     Results from last 7 days   Lab Units 02/23/22  0612   PH ART  7 371   PCO2 ART mm Hg 50 3*   PO2 ART mm Hg 63 3*   HCO3 ART mmol/L 28 5*   BASE EXC ART mmol/L 2 5   O2 CONTENT ART mL/dL 15 7*   O2 HGB, ARTERIAL % 91 6*   ABG SOURCE  Radial, Right     Results from last 7 days   Lab Units 02/23/22  0359 02/23/22  0144   PH KING  7 227* 7 117*   PCO2 KING mm Hg 73 0* 86 8*   PO2 KING mm Hg 27 8* 29 1*   HCO3 KING mmol/L 29 7 27 4   BASE EXC KING mmol/L 0 4 -3 9   O2 CONTENT KING ml/dL 6 9 6 7   O2 HGB, VENOUS % 39 1* 37 0*     Results from last 7 days   Lab Units 02/23/22  0507 02/23/22  0155   HS TNI 0HR ng/L  --  42   HS TNI 2HR ng/L 422*  --    HSTNI D2 ng/L 380*  --      Results from last 7 days   Lab Units 02/23/22  0144   D-DIMER QUANTITATIVE ug/ml FEU 3 25*     Results from last 7 days   Lab Units 02/23/22  1428 02/23/22 0144   PROTIME seconds  --  14 1   INR   --  1 11 PTT seconds 57* 28     Results from last 7 days   Lab Units 02/23/22  0507 02/23/22  0144   PROCALCITONIN ng/ml 0 13 <0 05     Results from last 7 days   Lab Units 02/23/22  0155   LACTIC ACID mmol/L 2 0     Results from last 7 days   Lab Units 02/23/22  0144   NT-PRO BNP pg/mL 3,146*     Results from last 7 days   Lab Units 02/23/22  0205   INFLUENZA A PCR  Negative   INFLUENZA B PCR  Negative   RSV PCR  Negative     Results from last 7 days   Lab Units 02/23/22  1203 02/23/22  0155 02/23/22  0144   BLOOD CULTURE   --  Received in Microbiology Lab  Culture in Progress  Received in Microbiology Lab  Culture in Progress     GRAM STAIN RESULT  No Polys or Bacteria seen  --   --      Results from last 7 days   Lab Units 02/23/22  1203   TOTAL COUNTED  100   WBC FLUID /ul 564     ED Treatment:   Medication Administration from 02/23/2022 0136 to 02/23/2022 7380       Date/Time Order Dose Route Action     02/23/2022 0217 cefTRIAXone (ROCEPHIN) IVPB (premix in dextrose) 2,000 mg 50 mL 2,000 mg Intravenous New Bag     02/23/2022 0257 iohexol (OMNIPAQUE) 350 MG/ML injection (SINGLE-DOSE) 85 mL 85 mL Intravenous Given     02/23/2022 0351 furosemide (LASIX) injection 40 mg 40 mg Intravenous Given     02/23/2022 6262 aspirin chewable tablet 324 mg 324 mg Oral Given     02/23/2022 0711 heparin (porcine) injection 4,000 Units 4,000 Units Intravenous Given     02/23/2022 0711 heparin (porcine) 25,000 units in 0 45% NaCl 250 mL infusion (premix) 12 Units/kg/hr Intravenous New Bag     02/23/2022 0710 atorvastatin (LIPITOR) tablet 80 mg 80 mg Oral Given        Past Medical History:   Diagnosis Date    Diabetes mellitus ([facility] [address] )     Hyperlipidemia     Hypertension      Present on Admission:   Hyperlipidemia   Benign essential hypertension   Unilateral complete BKA (HCC)   Acute on chronic systolic congestive heart failure (HCC)   PVD (peripheral vascular disease) (HCC)   Elevated troponin   Pleural effusion, bilateral   Acute on chronic respiratory failure with hypoxia and hypercapnia (HCC)      Admitting Diagnosis: Shortness of breath [R06 02]  SOB (shortness of breath) [R06 02]  Pleural effusion [J90]  Elevated troponin [R77 8]  Age/Sex: 60 y o  male  Admission Orders:  Scheduled Medications:  No current facility-administered medications for this encounter  Continuous IV Infusions:  No current facility-administered medications for this encounter  PRN Meds:  No current facility-administered medications for this encounter  IP CONSULT TO CARDIOLOGY  IP CONSULT TO PULMONOLOGY    Network Utilization Review Department  ATTENTION: Please call with any questions or concerns to [phone] and carefully listen to the prompts so that you are directed to the right person  All voicemails are confidential   [facility] all requests for admission clinical reviews, approved or denied determinations and any other requests to dedicated fax number below belonging to the campus where the patient is receiving treatment   List of dedicated fax numbers for the Facilities:  [address] DENIALS (Administrative/Medical Necessity) [phone]   [address] (Maternity/NICU/Pediatrics) [phone]   [address]  [address] Medical [place] [address] [address] [place] [address] [address] [facility] [place] [address] [phone]

## 2022-02-24 NOTE — ASSESSMENT & PLAN NOTE
- saturating 50s on arrival   - improved with bipap and gradually weaned to nasal canula 2-4 litres saturating above 90%   - CTA chest with bilateral large pleural effusions and Bilateral patchy groundglass opacities with interlobular septal thickening suspicious for pulmonary edema    - D dimer elevated above 2 but CTA chest negative for pulmonary embolism , noted to have pulmonary edema and effusion   As per daughter , he has had covid in [place]  covid screen in ER negative on admission  Improved with diuresis and thoracentesis  Currently saturating in mid 90s on 2 L of supplemental oxygen    Reports improvement in breathing  Echocardiogram with EF 35%  · Continue diuresis  · Monitor intake output  · Daily weight  · Optimize CHF medication  · Follow-up BMP  · Ischemic workup as per Cardiology  · Follow-up pleural fluid studies

## 2022-02-24 NOTE — ASSESSMENT & PLAN NOTE
Continue metoprolol and lisinopril    Norvasc was discontinued  Consider transitioning to ARB as patient reports chronic cough

## 2022-02-24 NOTE — NURSING NOTE
Report called to [facility] [place] and given to [address] providing transportation [facility]  Pt discharged into [facility] care

## 2022-02-24 NOTE — ASSESSMENT & PLAN NOTE
Likely secondary to CHF  Status post large volume thoracentesis bilaterally  · Follow-up fluid studies

## 2022-02-24 NOTE — ASSESSMENT & PLAN NOTE
Lab Results   Component Value Date    HGBA1C 6 5 (H) 02/23/2022       Recent Labs     02/23/22  0808 02/23/22  1310 02/23/22  1608   POCGLU 221* 175* 248*       Blood Sugar Average: Last 72 hrs:  (P) 009 [id number]     Poor diet compliance as per family  On Lantus 68 units but does not check his blood sugar regularly    Denies symptoms and events of hypoglycemia  Insulin sliding scale   Diabetic diet  Monitor

## 2022-02-24 NOTE — ASSESSMENT & PLAN NOTE
- no chest pain / no cardiac history   - has multiple risk factors of uncontrolled DM, PVD HTN Dyslipidemia   - CTA chest negative for pulmonary embolism  Noted to abnormal EKG and positive cardiac markers  Echocardiogram with EF 35%, concerning for ischemic etiology  Remains chest pain-free    Hemodynamically stable  · Continue aspirin, metoprolol, statin  · On IV heparin  · Further ischemic workup as per Cardiology   · - avoid NTG since patient takes sildenafil

## 2022-02-26 LAB
ATRIAL RATE: 78 BPM
BACTERIA SPEC BFLD CULT: NO GROWTH
GRAM STN SPEC: NORMAL
P AXIS: 61 DEGREES
PR INTERVAL: 134 MS
QRS AXIS: 75 DEGREES
QRSD INTERVAL: 100 MS
QT INTERVAL: 382 MS
QTC INTERVAL: 435 MS
T WAVE AXIS: 158 DEGREES
VENTRICULAR RATE: 78 BPM

## 2022-02-26 PROCEDURE — 93010 ELECTROCARDIOGRAM REPORT: CPT | Performed by: INTERNAL MEDICINE

## 2022-02-28 LAB
BACTERIA BLD CULT: NORMAL
BACTERIA BLD CULT: NORMAL

## 2022-03-03 ENCOUNTER — TELEPHONE (OUTPATIENT)
Dept: FAMILY MEDICINE CLINIC | Facility: CLINIC | Age: 57
End: 2022-03-03

## 2022-03-03 NOTE — TELEPHONE ENCOUNTER
Mayra Araya is having open heart surgery on Wednesday and really would like to speak with Dr Melyssa Walters  It is very important that he speaks with her       Thank You

## 2022-03-03 NOTE — TELEPHONE ENCOUNTER
I called him back; he has upcoming bypass surgery scheduled for 3/9 at [facility] and wanted to make sure we knew this  I told him they are sending us his records as he has his appointments and we will continue to follow along and see patient after discharge  No further action needed

## 2022-03-03 NOTE — TELEPHONE ENCOUNTER
He is asking to speak with you but if you need us to call for you to get more information, let us know Eric Sanchez

## 2022-03-11 NOTE — TELEPHONE ENCOUNTER
DR LIRA [facility]    Patient has been waiting for his lantus to be prior authorized  Hie is completely out  Please call [phone] for auth 
ID [id number]  Cover My Meds done over the phone Ref# PA [id number] Pt aware waiting for a decision or more questions  lakshmi
PA needed to be redone  This PA completed on 01/02/2019 was approved until 12/31/2019  Reference number [id number]  Pt and pharmacy are aware    Task Complete  [email or web address]
normal...

## 2022-04-13 ENCOUNTER — TELEPHONE (OUTPATIENT)
Dept: FAMILY MEDICINE CLINIC | Facility: CLINIC | Age: 57
End: 2022-04-13

## 2022-04-13 ENCOUNTER — TELEPHONE (OUTPATIENT)
Dept: CARDIAC REHAB | Facility: CLINIC | Age: 57
End: 2022-04-13

## 2022-04-13 DIAGNOSIS — E11.51 DIABETES MELLITUS WITH PERIPHERAL CIRCULATORY DISORDER, CONTROLLED (HCC): ICD-10-CM

## 2022-04-13 RX ORDER — INSULIN GLARGINE 100 [IU]/ML
68 INJECTION, SOLUTION SUBCUTANEOUS DAILY
Qty: 15 ML | Refills: 5 | Status: SHIPPED | OUTPATIENT
Start: 2022-04-13 | End: 2022-04-13

## 2022-04-13 NOTE — TELEPHONE ENCOUNTER
Changes Requested     Basaglar KwikPen 100 units/mL injection pen         Changed from: Lantus SoloStar 100 units/mL injection pen    All pharmacy suggested alternatives are listed below    Sig: N/A    Disp:  15 mL    Refills:  0    Start: 4/13/2022    Class: Normal    Non-formulary For: Diabetes mellitus with peripheral circulatory disorder, controlled ([facility] [address] )    Last ordered: 4 months ago by Irena Hardin MD Last refill: 4/13/2022    Rx #: [id number]    Pharmacy comment: Alternative Requested:THE PRESCRIBED MEDICATION IS NOT COVERED BY INSURANCE  PLEASE CONSIDER CHANGING TO ONE OF THE SUGGESTED COVERED ALTERNATIVES OR SUBMITTING FOR A PRIOR AUTHORIZATION     All Pharmacy             Copied/pasted above message attached to insulin refill [email or web address]

## 2022-04-14 ENCOUNTER — CLINICAL SUPPORT (OUTPATIENT)
Dept: CARDIAC REHAB | Facility: CLINIC | Age: 57
End: 2022-04-14
Payer: COMMERCIAL

## 2022-04-14 DIAGNOSIS — Z95.1 S/P CABG X 3: Primary | ICD-10-CM

## 2022-04-14 PROCEDURE — 93797 PHYS/QHP OP CAR RHAB WO ECG: CPT

## 2022-04-14 NOTE — PROGRESS NOTES
CARDIAC REHAB ASSESSMENT    Today's date: 2022  Patient name: Joyce Adams     : 1965       MRN: [id number]  PCP: Mainor Hinds MD  Referring Physician: Andrea Temple  Cardiologist: Andrea Temple MD    Surgeon: Annie Hennessy  Dx:   Encounter Diagnosis   Name Primary?  S/P CABG x 3 Yes       Date of onset: 3/9/2022  Cultural needs:     Weight    Wt Readings from Last 1 Encounters:   22 79 8 kg (176 lb)      Height:   Ht Readings from Last 1 Encounters:   22 5' 7" (1 702 m)     Medical History:   Past Medical History:   Diagnosis Date    Diabetes mellitus ([facility] [address] )     Hyperlipidemia     Hypertension          Physical Limitations: L BKA   umprire so he is used to squatting     Fall Risk: Low   Comments: Ambulates with a steady gait with no assist device    Anginal Equivalent: None/denies angina   NTG use: No prescription     Risk Factors   Cholesterol: Yes  Smoking: Former user -   HTN: Yes  DM: Type 2   average   insulin  Obesity: No   Inactivity: No  Stress:  perceived  stress: 6/10   Stressors: tired, not a morning person   Goals for Stress Management:stay busy    Family History:  Family History   Problem Relation Age of Onset    No Known Problems Mother        Allergies: Bee venom and Penicillins  ETOH:   Social History     Substance and Sexual Activity   Alcohol Use Not Currently         Current Medications:   Current Outpatient Medications   Medication Sig Dispense Refill    aspirin (ECOTRIN LOW STRENGTH) 81 mg EC tablet Take 1 tablet (81 mg total) by mouth daily  0    atorvastatin (LIPITOR) 80 mg tablet Take 1 tablet (80 mg total) by mouth daily with dinner  0    B-D UF III MINI PEN NEEDLES 31G X 5 MM MISC USE AS DIRECTED 50 each 0    CHUYITA MICROLET LANCETS lancets by Does not apply route      furosemide (LASIX) 10 mg/mL Infuse 4 mL (40 mg total) into a venous catheter 2 (two) times a day 4 mL 0    Heparin Sodium, Porcine, (heparin, porcine,) 1,000 units/mL Infuse 2 mL (2,000 Units total) into a venous catheter every hour as needed (Therapeutic Heparin: PTT 43 - 59 seconds) 1 mL 0    Heparin Sodium, Porcine, (heparin, porcine,) 1,000 units/mL Infuse 4 mL (4,000 Units total) into a venous catheter every hour as needed (Therapeutic Heparin: PTT less than or equal to 42 seconds) 1 mL 0    heparin, porcine, 93027-3 45 UT/250ML-% Infuse 240-1,600 Units/hr into a venous catheter titrated  0    insulin glargine (Basaglar KwikPen) 100 units/mL injection pen Inject 68 Units under the skin daily at bedtime 15 mL 1    insulin lispro (HumaLOG) 100 units/mL injection Inject 1-5 Units under the skin daily at bedtime  0    insulin lispro (HumaLOG) 100 units/mL injection Inject 1-6 Units under the skin 3 (three) times a day before meals  0    Insulin Pen Needle 31G X 5 MM MISC BD Ultra-Fine Mini Pen Needle 31 gauge x 3/16"      ipratropium-albuterol (DUO-NEB) 0 5-2 5 mg/3 mL nebulizer solution Take 3 mL by nebulization every 6 (six) hours as needed for wheezing or shortness of breath  0    lisinopril (ZESTRIL) 20 mg tablet Take 1 tablet (20 mg total) by mouth daily  0    metoprolol tartrate (LOPRESSOR) 25 mg tablet Take 1 tablet (25 mg total) by mouth every 12 (twelve) hours  0     No current facility-administered medications for this visit  Current Functional Status  Occupation: machine tech, [employer] and   Recreation: about 1 1/2 month until he can go back to work  ADLs:Capable of performing light ADLs only able to perform self-care resumed driving lives alone  [place]: sternal precautions  Exercise: outdoor walking   Other:     Patient Specific Goals:  Strength increased, increased endurance/stamina, get back to work umpire       Short Term Program Goals: increased strength improved energy/stamina with ADLs exercise 120-150 mins/wk improved BG control    Long Term Goals: Improved functional capacity  Improved A1c  Improved fasting glucose    Ability to reach goals/rehabilitation potential:  Excellent    Projected return to function: 12 weeks  Objective tests: sub-max NuStep ETT      Nutritional   Reviewed details of Rate your Plate  Discussed key elements of heart healthy eating  Reviewed patient goals for dietary modifications and their clinical implications  Reviewed most recent lipid profile  Goals for dietary modification: increase fruits and vegetables  reduce sweets/frozen desserts  Decreased potatoes, increased consumption of chicken and turkey      Emotional/Social  Patient reports he/she is coping well with good social support and denies depression or anxiety    Marital status:     Domestic Violence Screening: No    Comments: walking outdoors 15 mins-30mins  Denies SOB, chest pain discomfort   and umpire  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

## 2022-04-14 NOTE — PROGRESS NOTES
Cardiac Rehabilitation Plan of Care   Initial Care Plan          Today's date: 2022   # of Exercise Sessions Completed: 1 initial eval   Patient name: Kulwant Vale      : 1965  Age: 60 y o  MRN: [id number]  Referring Physician: Ran Jett  Cardiologist: Ran Jett   Provider: Dorita Ballesteros  Clinician: Waldemar Riedel, MS, CEP    Dx:   Encounter Diagnosis   Name Primary?  S/P CABG x 3 Yes     Date of onset: 3/9/2022      SUMMARY OF PROGRESS:  Today is Betsy Talebrt's initial evaluation to begin Cardiac Rehab post CABG x3 and s/p AVR  The patient does currently follow a formal exercise program at home, including outdoor walking  He has resumed all ADLs following sternal restrictions  Depression screening using the PHQ-9 interprets the patient's score 5-9 = Mild Depression  MELI-7 screening tool for anxiety suggests 0-4  = Not anxious  When addressed, the patient denies having depression/anxiety  Patient reports excellent social/emotional support  Information to begin using [employer] was provided as well as contact information for counseling through [employer]  PHQ-9 score will be reassessed in 30 days  The patient is a former smoker  Patient admits to 100% medication compliance  Patient reports the following physical limitations: L leg BKA  The patient completed an initial submaximal NuStep ETT  The patient completed 3 minutes of stage 4 (3 37METs) with test termination of RPE 6  Resting  //60 with appropriate hemodynamic response to exercise reaching 110//62  Patient denied symptoms during exercise  Telemetry revealed NSR  Pt arrived with bandage/gauze covering incision reporting some drainage  He admits MD is aware and has f/u visit scheduled for later today   Patient was counseled on exercise guidelines to achieve a minimum of 150 mins/wk of moderate intensity (RPE 4-6) exercise and encouraged to add 1-2 days of exercise on opposite days of cardiac rehab as tolerated  We discussed current dietary habits and goals of heart healthy eating for lipid management and diabetes management  The patient has T2D, diet controlled, Patient reported fasting , on Insulin   Patient's goals include: return to previous activities/work with increased strength, stamina and endurance, improved BS values, gain muscle mass, increase consumption of low fat/lean turkey, chicken and vegetables and reduce consumption of potatoes/portion control  The patient's CAD risk factors include:  hypertension, hyperlipidemia and diabetes  His education will focus on lifestyle modification/education specific to His needs  Patient will attend group education classes on heart healthy eating, reading food labels, stress management, risk factor reduction, understanding heart disease and common heart medications  Patient will attend 35 monitored exercise sessions, 3x/wk for 12-18 weeks beginning 2022         Medication compliance: Yes   Comments: Pt reports to be compliant with medications  Fall Risk: Low   Comments: Ambulates with a steady gait with no assist device    EKG Interpretation: NSR      EXERCISE ASSESSMENT and PLAN    Current Exercise Program in Rehab:       Frequency: 1 days/week Supplement with home exercise 2+ days/wk as tolerated       Minutes: 12         METS: 3 37            HR: 77-84   RPE: 3-7         Modalities: NuStep      Exercise Progression 30 Day Goals :    Frequency: 3 days/week of cardiac rehab     Supplement with home exercise 2+ days/wk as tolerated    Minutes: 30                            >150 mins/wk of moderate intensity exercise   METS: 3 0-4 5   HR: 77-90    RPE: 4-6   Modalities: UBE, NuStep and Recumbent bike    Strength trainin-3 days / week  12-15 repetitions  1-2 sets per modality   Will be added following at least 8 weeks post surgery and 8-10 monitored sessions   Modalities: Arm Curl, Upright Rows, Front Raises and Shoulder Britt    Home Exercise: Type: outdoor walking 15-30mins    Goals: Exercise 5 days/wk, >150mins/wk of moderate intensity exercise, Resume ADLs with increased strength, Return to work unrestricted, Attend Rehab regularly and start a home exercise program    Progression Toward Goals:  Reviewed Pt goals and determined plan of care, Will continue to educate and progress as tolerated  Education: benefit of exercise for CAD risk factors, AHA guidelines to achieve >150 mins/wk of moderate exercise, RPE scale and class: Risk Factors for Heart Disease   Plan:Education class: Risk Factors for Heart Disease  Readiness to change: Preparation:  (Getting ready to change)       NUTRITION ASSESSMENT AND PLAN    Weight control:    Starting weight: 166 5   Current weight:     Waist circumference:    Startin 5   Current:      Diabetes: T2D, diet controlled, Patient reported fasting , on Insulin   A1c: n/a    last measured: n/a    Lipid management: Discussed diet and lipid management and Last lipid profile 2022  Chol 134  TRG 51  HDL 49  LDL 75    Goals:fasting BG , improved A1c  < 7 0%, Eat 4-5 cups of fruits and vegetables daily, choose healthy snacks: light popcorn, plain pretzels, seldom eat or choose low fat ice-cream, fruit juice bars or frozen yogurt  and eliminate or choose low-fat sweets    Progression Toward Goals: Reviewed Pt goals and determined plan of care, Will continue to educate and progress as tolerated      Education: heart healthy eating  low sodium diet  nutrition for  lipid management  nutrition for Improved BG control  Plan: Education class: [place] Food Labels, Education Class: Heart Healthy Eating, eat fewer desserts and sweets and monitor home blood glucose  Readiness to change: Preparation:  (Getting ready to change)       PSYCHOSOCIAL ASSESSMENT AND PLAN    Emotional:  Depression assessment:  PHQ-9 = 5-9 = Mild Depression            Anxiety measure:  MELI-7 = 0-4  = Not anxious  Self-reported stress level:  6  Social support: Patient reports excellent emotional/social support from family    Goals:  Reduce perceived stress to 1-3/10, improved [facility] QOL < 27, PHQ-9 - reduced severity by one level, Social Support in [place] Score < 3 and increased energy    Progression Toward Goals: Reviewed Pt goals and determined plan of care, Will continue to educate and progress as tolerated  Education: signs/sxs of depression, benefits of a positive support system and stress management techniques  Plan: Class: Stress and Your Health, Class: Relaxation, Refer to [employer], Exercise, Enjoy a hobby and Repeat PHQ-9 every 30 days if score >5  Readiness to change: Contemplation:  (Acknowledging that there is a problem but not yet ready or sure of wanting to make a change)      OTHER CORE COMPONENTS     Tobacco:   Social History     Tobacco Use   Smoking Status Former Smoker    Quit date:     Years since quittin 2   Smokeless Tobacco Never Used       Tobacco Use Intervention:   Pt quit in    and has abstained    Anginal Symptoms:  None   NTG use: No prescription    Blood pressure:    Restin//60   Exercise: 110//62    Goals: consistent BP < 130/80, moderate intensity exercise >150 mins/wk and medication compliance    Progression Toward Goals: Reviewed Pt goals and determined plan of care, Will continue to educate and progress as tolerated      Education:  understanding high blood pressure and it's relationship to CAD and low sodium diet and HTN  Plan: Class: Understanding Heart Disease, Class: Common Heart Medications, engage in regular exercise, monitor home BP and continue with salt free diet  Readiness to change: Preparation:  (Getting ready to change)

## 2022-04-18 ENCOUNTER — CLINICAL SUPPORT (OUTPATIENT)
Dept: CARDIAC REHAB | Facility: CLINIC | Age: 57
End: 2022-04-18
Payer: COMMERCIAL

## 2022-04-18 DIAGNOSIS — Z95.1 S/P CABG X 3: Primary | ICD-10-CM

## 2022-04-18 PROCEDURE — 93798 PHYS/QHP OP CAR RHAB W/ECG: CPT

## 2022-04-20 ENCOUNTER — CLINICAL SUPPORT (OUTPATIENT)
Dept: CARDIAC REHAB | Facility: CLINIC | Age: 57
End: 2022-04-20
Payer: COMMERCIAL

## 2022-04-20 DIAGNOSIS — Z95.1 S/P CABG X 3: Primary | ICD-10-CM

## 2022-04-20 PROCEDURE — 93798 PHYS/QHP OP CAR RHAB W/ECG: CPT

## 2022-04-21 NOTE — PROGRESS NOTES
Assessment/Plan:    1  Type 2 diabetes mellitus with other specified complication, with long-term current use of insulin [facility])  Assessment & Plan:    Lab Results   Component Value Date    HGBA1C 6 5 (H) 02/23/2022     Well controlled  Orders:  -     CBC and differential; Future; Expected date: 07/28/2022  -     Comprehensive metabolic panel; Future; Expected date: 07/28/2022  -     Lipid panel; Future; Expected date: 07/28/2022  -     HEMOGLOBIN A1C W/ EAG ESTIMATION; Future; Expected date: 07/28/2022  -     CBC and differential  -     Comprehensive metabolic panel  -     Lipid panel  -     HEMOGLOBIN A1C W/ EAG ESTIMATION  -     IRIS Diabetic eye exam    2  Diabetes mellitus with peripheral circulatory disorder, controlled [facility])  Assessment & Plan:    Lab Results   Component Value Date    HGBA1C 6 5 (H) 02/23/2022     Reviewed A1C from February in [place], A1C 6 6, very well controlled  Will continue current dosage of insulin as ordered  Discussed need for good foot and eye care  R heel ulceration has resolved  F/u after labs in 3 months  3  Benign essential hypertension  Assessment & Plan:  Medications were changed through cardiology at [place] and bp is well controlled, will continue as ordered  Orders:  -     CBC and differential; Future; Expected date: 07/28/2022  -     Comprehensive metabolic panel; Future; Expected date: 07/28/2022  -     Lipid panel; Future; Expected date: 07/28/2022  -     HEMOGLOBIN A1C W/ EAG ESTIMATION; Future; Expected date: 07/28/2022  -     CBC and differential  -     Comprehensive metabolic panel  -     Lipid panel  -     HEMOGLOBIN A1C W/ EAG ESTIMATION    4  Ischemic congestive cardiomyopathy (HCC)  Assessment & Plan:  Improved s/p CABG and AVR  Clinically euvolemic  Continue spironolactone  Advised on need for daily weights        5  Type 2 diabetes mellitus with foot ulcer, unspecified whether long term insulin use ([facility] [address] )  Assessment & Plan:    Lab Results Component Value Date    HGBA1C 6 5 (H) 02/23/2022     Foot ulcer has resolved  Discussed need for ongoing good foot care  6  Hyperlipidemia, unspecified hyperlipidemia type  Assessment & Plan:  Continue rosuvastatin 20 mg daily  There are no Patient Instructions on file for this visit  Return in about 3 months (around 7/28/2022)  Subjective:      Patient ID: Stuart Kendall is a 60 y o  male  Chief Complaint   Patient presents with    Follow-up     hospital f/u nm lpn       He was admitted in February for acute CHF at [facility]  He had CABG x 3 and AVR done at [facility]  They are adjusting his coumadin and he goes back on 5/11  He feels great, "like I'm 11 again "  There is no chest pain or dyspnea  No bruising or excessive bleeding  Doing cardiac rehab and he feels this is helping immensely  His R heel ulcer has healed but he is still having some issues with his stump  He is mostly healed  They did redo his prosthesis  He is not back to work as yet, waiting to be discharged from the surgeon  Denies hypoglycemia  Not up to date with ophtho  The following portions of the patient's history were reviewed and updated as appropriate: allergies, current medications, past family history, past medical history, past social history, past surgical history and problem list     Review of Systems   Constitutional: Negative  Respiratory: Negative  Cardiovascular: Negative  Skin: Negative  Neurological: Negative            Current Outpatient Medications   Medication Sig Dispense Refill    amiodarone 200 mg tablet Take 200 mg by mouth daily      aspirin (ECOTRIN LOW STRENGTH) 81 mg EC tablet Take 1 tablet (81 mg total) by mouth daily  0    B-D UF III MINI PEN NEEDLES 31G X 5 MM MISC USE AS DIRECTED 50 each 0    CHUYITA MICROLET LANCETS lancets by Does not apply route      carvedilol (COREG) 3 125 mg tablet TAKE 1 TABLET (3 125 MG TOTAL) BY MOUTH 2 TIMES A DAY WITH MEALS  insulin glargine (Basaglar KwikPen) 100 units/mL injection pen Inject 68 Units under the skin daily at bedtime 15 mL 1    insulin lispro (HumaLOG) 100 units/mL injection Inject 1-5 Units under the skin daily at bedtime  0    insulin lispro (HumaLOG) 100 units/mL injection Inject 1-6 Units under the skin 3 (three) times a day before meals  0    Insulin Pen Needle 31G X 5 MM MISC BD Ultra-Fine Mini Pen Needle 31 gauge x 3/16"      rosuvastatin (CRESTOR) 20 MG tablet Take 20 mg by mouth daily      spironolactone (ALDACTONE) 25 mg tablet Take 25 mg by mouth daily      valsartan (DIOVAN) 40 mg tablet Take 40 mg by mouth daily      warfarin (COUMADIN) 2 5 mg tablet Take 3 mg by mouth daily       No current facility-administered medications for this visit  Objective:    /70   Pulse 75   Temp (!) 97 °F (36 1 °C)   Resp 18   Ht 5' 7" (1 702 m)   Wt 77 2 kg (170 lb 3 2 oz)   SpO2 98%   BMI 26 66 kg/m²        Physical Exam  Constitutional:       Appearance: He is well-developed  Eyes:      Conjunctiva/sclera: Conjunctivae normal    Neck:      Thyroid: No thyromegaly  Vascular: No JVD  Cardiovascular:      Rate and Rhythm: Normal rate and regular rhythm  Pulses: Pulses are weak  Dorsalis pedis pulses are 1+ on the right side  Heart sounds: Normal heart sounds  No murmur heard  No friction rub  No gallop  Pulmonary:      Effort: Pulmonary effort is normal       Breath sounds: Normal breath sounds  No wheezing or rales  Abdominal:      General: Bowel sounds are normal  There is no distension  Palpations: Abdomen is soft  Tenderness: There is no abdominal tenderness  Musculoskeletal:      Cervical back: Neck supple  Comments: S/p L BKA  Minimal erythema to stump medially, no open wounds   Feet:      Right foot:      Skin integrity: Dry skin present  No ulcer, skin breakdown or callus        Left foot: amputated  Skin:     Comments: Midline sternal scar well healed, no fluctuance or discharge          Patient's shoes and socks removed  Right Foot/Ankle   Right Foot Inspection  Skin Exam: dry skin  No callus, no maceration, no pre-ulcer, no ulcer and no callus  Toe Exam: ROM and strength within normal limits  Sensory   Proprioception: intact  Monofilament testing: intact    Vascular  Capillary refills: < 3 seconds  The right DP pulse is 1+       Left Foot/Ankle  Left Foot Inspection  Amputation: amputation left foot     Assign Risk Category  No deformity present  No loss of protective sensation  Weak pulses  Risk: 1           Isabel Mata MD

## 2022-04-22 ENCOUNTER — CLINICAL SUPPORT (OUTPATIENT)
Dept: CARDIAC REHAB | Facility: CLINIC | Age: 57
End: 2022-04-22
Payer: COMMERCIAL

## 2022-04-22 DIAGNOSIS — Z95.1 S/P CABG X 3: Primary | ICD-10-CM

## 2022-04-22 PROCEDURE — 93798 PHYS/QHP OP CAR RHAB W/ECG: CPT

## 2022-04-25 ENCOUNTER — CLINICAL SUPPORT (OUTPATIENT)
Dept: CARDIAC REHAB | Facility: CLINIC | Age: 57
End: 2022-04-25
Payer: COMMERCIAL

## 2022-04-25 DIAGNOSIS — Z95.1 S/P CABG X 3: Primary | ICD-10-CM

## 2022-04-25 PROCEDURE — 93798 PHYS/QHP OP CAR RHAB W/ECG: CPT

## 2022-04-27 ENCOUNTER — CLINICAL SUPPORT (OUTPATIENT)
Dept: CARDIAC REHAB | Facility: CLINIC | Age: 57
End: 2022-04-27
Payer: COMMERCIAL

## 2022-04-27 DIAGNOSIS — Z95.1 S/P CABG X 3: Primary | ICD-10-CM

## 2022-04-27 PROCEDURE — 93798 PHYS/QHP OP CAR RHAB W/ECG: CPT

## 2022-04-28 ENCOUNTER — OFFICE VISIT (OUTPATIENT)
Dept: FAMILY MEDICINE CLINIC | Facility: CLINIC | Age: 57
End: 2022-04-28
Payer: COMMERCIAL

## 2022-04-28 VITALS
DIASTOLIC BLOOD PRESSURE: 70 MMHG | HEART RATE: 75 BPM | SYSTOLIC BLOOD PRESSURE: 114 MMHG | OXYGEN SATURATION: 98 % | HEIGHT: 67 IN | TEMPERATURE: 97 F | RESPIRATION RATE: 18 BRPM | WEIGHT: 170.2 LBS | BODY MASS INDEX: 26.71 KG/M2

## 2022-04-28 DIAGNOSIS — E11.621 TYPE 2 DIABETES MELLITUS WITH FOOT ULCER, UNSPECIFIED WHETHER LONG TERM INSULIN USE (HCC): ICD-10-CM

## 2022-04-28 DIAGNOSIS — Z79.4 TYPE 2 DIABETES MELLITUS WITH OTHER SPECIFIED COMPLICATION, WITH LONG-TERM CURRENT USE OF INSULIN (HCC): Primary | ICD-10-CM

## 2022-04-28 DIAGNOSIS — E78.5 HYPERLIPIDEMIA, UNSPECIFIED HYPERLIPIDEMIA TYPE: Chronic | ICD-10-CM

## 2022-04-28 DIAGNOSIS — I42.0 ISCHEMIC CONGESTIVE CARDIOMYOPATHY (HCC): ICD-10-CM

## 2022-04-28 DIAGNOSIS — E11.69 TYPE 2 DIABETES MELLITUS WITH OTHER SPECIFIED COMPLICATION, WITH LONG-TERM CURRENT USE OF INSULIN (HCC): Primary | ICD-10-CM

## 2022-04-28 DIAGNOSIS — E11.51 DIABETES MELLITUS WITH PERIPHERAL CIRCULATORY DISORDER, CONTROLLED (HCC): ICD-10-CM

## 2022-04-28 DIAGNOSIS — I10 BENIGN ESSENTIAL HYPERTENSION: Chronic | ICD-10-CM

## 2022-04-28 DIAGNOSIS — I25.5 ISCHEMIC CONGESTIVE CARDIOMYOPATHY (HCC): ICD-10-CM

## 2022-04-28 DIAGNOSIS — L97.509 TYPE 2 DIABETES MELLITUS WITH FOOT ULCER, UNSPECIFIED WHETHER LONG TERM INSULIN USE (HCC): ICD-10-CM

## 2022-04-28 PROBLEM — L89.892 PRESSURE ULCER OF BKA STUMP, STAGE 2 (HCC): Status: RESOLVED | Noted: 2020-05-04 | Resolved: 2022-04-28

## 2022-04-28 PROBLEM — E11.65 UNCONTROLLED TYPE 2 DIABETES MELLITUS WITH HYPERGLYCEMIA (HCC): Status: ACTIVE | Noted: 2022-02-24

## 2022-04-28 PROBLEM — S88.119A UNILATERAL COMPLETE BKA (HCC): Chronic | Status: RESOLVED | Noted: 2020-08-19 | Resolved: 2022-04-28

## 2022-04-28 PROBLEM — I35.0 AORTIC STENOSIS, SEVERE: Status: ACTIVE | Noted: 2022-03-08

## 2022-04-28 PROBLEM — L97.412 ULCER OF RIGHT HEEL, WITH FAT LAYER EXPOSED (HCC): Status: RESOLVED | Noted: 2020-11-22 | Resolved: 2022-04-28

## 2022-04-28 PROBLEM — Z89.512 S/P UNILATERAL BKA (BELOW KNEE AMPUTATION), LEFT (HCC): Status: ACTIVE | Noted: 2022-03-16

## 2022-04-28 PROBLEM — E11.65 UNCONTROLLED TYPE 2 DIABETES MELLITUS WITH HYPERGLYCEMIA (HCC): Status: RESOLVED | Noted: 2022-02-24 | Resolved: 2022-04-28

## 2022-04-28 PROBLEM — T87.89 PRESSURE ULCER OF BKA STUMP, STAGE 2 (HCC): Status: RESOLVED | Noted: 2020-05-04 | Resolved: 2022-04-28

## 2022-04-28 PROBLEM — I35.0 AORTIC STENOSIS, SEVERE: Status: RESOLVED | Noted: 2022-03-08 | Resolved: 2022-04-28

## 2022-04-28 PROBLEM — Z95.1 S/P CABG X 3: Status: ACTIVE | Noted: 2022-03-16

## 2022-04-28 PROCEDURE — 3074F SYST BP LT 130 MM HG: CPT | Performed by: INTERNAL MEDICINE

## 2022-04-28 PROCEDURE — 99214 OFFICE O/P EST MOD 30 MIN: CPT | Performed by: INTERNAL MEDICINE

## 2022-04-28 PROCEDURE — 3008F BODY MASS INDEX DOCD: CPT | Performed by: INTERNAL MEDICINE

## 2022-04-28 PROCEDURE — 1036F TOBACCO NON-USER: CPT | Performed by: INTERNAL MEDICINE

## 2022-04-28 PROCEDURE — 3078F DIAST BP <80 MM HG: CPT | Performed by: INTERNAL MEDICINE

## 2022-04-28 RX ORDER — SPIRONOLACTONE 25 MG/1
25 TABLET ORAL DAILY
COMMUNITY

## 2022-04-28 RX ORDER — VALSARTAN 40 MG/1
40 TABLET ORAL DAILY
COMMUNITY
Start: 2022-04-11

## 2022-04-28 RX ORDER — AMIODARONE HYDROCHLORIDE 200 MG/1
200 TABLET ORAL DAILY
COMMUNITY
Start: 2022-04-11 | End: 2022-06-09 | Stop reason: SDUPTHER

## 2022-04-28 RX ORDER — CARVEDILOL 3.12 MG/1
TABLET ORAL
COMMUNITY
Start: 2022-04-11

## 2022-04-28 RX ORDER — ROSUVASTATIN CALCIUM 20 MG/1
20 TABLET, COATED ORAL DAILY
COMMUNITY

## 2022-04-28 RX ORDER — WARFARIN SODIUM 2.5 MG/1
3 TABLET ORAL
COMMUNITY

## 2022-04-28 NOTE — ASSESSMENT & PLAN NOTE
Lab Results   Component Value Date    HGBA1C 6 5 (H) 02/23/2022     Reviewed A1C from February in [place], A1C 6 6, very well controlled  Will continue current dosage of insulin as ordered  Discussed need for good foot and eye care  R heel ulceration has resolved  F/u after labs in 3 months

## 2022-04-28 NOTE — ASSESSMENT & PLAN NOTE
Lab Results   Component Value Date    HGBA1C 6 5 (H) 02/23/2022     Foot ulcer has resolved  Discussed need for ongoing good foot care

## 2022-04-28 NOTE — ASSESSMENT & PLAN NOTE
Improved s/p CABG and AVR  Clinically euvolemic  Continue spironolactone  Advised on need for daily weights

## 2022-04-28 NOTE — ASSESSMENT & PLAN NOTE
Medications were changed through cardiology at [facility] and bp is well controlled, will continue as ordered

## 2022-04-29 ENCOUNTER — CLINICAL SUPPORT (OUTPATIENT)
Dept: CARDIAC REHAB | Facility: CLINIC | Age: 57
End: 2022-04-29
Payer: COMMERCIAL

## 2022-04-29 DIAGNOSIS — Z95.1 S/P CABG X 3: Primary | ICD-10-CM

## 2022-04-29 PROCEDURE — 93798 PHYS/QHP OP CAR RHAB W/ECG: CPT

## 2022-05-02 ENCOUNTER — CLINICAL SUPPORT (OUTPATIENT)
Dept: CARDIAC REHAB | Facility: CLINIC | Age: 57
End: 2022-05-02
Payer: COMMERCIAL

## 2022-05-02 DIAGNOSIS — Z95.1 S/P CABG X 3: Primary | ICD-10-CM

## 2022-05-02 PROCEDURE — 93798 PHYS/QHP OP CAR RHAB W/ECG: CPT

## 2022-05-04 ENCOUNTER — CLINICAL SUPPORT (OUTPATIENT)
Dept: CARDIAC REHAB | Facility: CLINIC | Age: 57
End: 2022-05-04
Payer: COMMERCIAL

## 2022-05-04 DIAGNOSIS — Z95.1 S/P CABG X 3: Primary | ICD-10-CM

## 2022-05-04 PROCEDURE — 93798 PHYS/QHP OP CAR RHAB W/ECG: CPT

## 2022-05-06 ENCOUNTER — CLINICAL SUPPORT (OUTPATIENT)
Dept: CARDIAC REHAB | Facility: CLINIC | Age: 57
End: 2022-05-06
Payer: COMMERCIAL

## 2022-05-06 DIAGNOSIS — Z95.1 S/P CABG X 3: Primary | ICD-10-CM

## 2022-05-06 PROCEDURE — 93798 PHYS/QHP OP CAR RHAB W/ECG: CPT

## 2022-05-09 ENCOUNTER — CLINICAL SUPPORT (OUTPATIENT)
Dept: CARDIAC REHAB | Facility: CLINIC | Age: 57
End: 2022-05-09
Payer: COMMERCIAL

## 2022-05-09 DIAGNOSIS — Z95.1 S/P CABG X 3: Primary | ICD-10-CM

## 2022-05-09 PROCEDURE — 93798 PHYS/QHP OP CAR RHAB W/ECG: CPT

## 2022-05-11 ENCOUNTER — CLINICAL SUPPORT (OUTPATIENT)
Dept: CARDIAC REHAB | Facility: CLINIC | Age: 57
End: 2022-05-11
Payer: COMMERCIAL

## 2022-05-11 DIAGNOSIS — Z95.1 S/P CABG X 3: Primary | ICD-10-CM

## 2022-05-11 PROCEDURE — 93798 PHYS/QHP OP CAR RHAB W/ECG: CPT

## 2022-05-11 NOTE — PROGRESS NOTES
Cardiac Rehabilitation Plan of Care   30 Day Reassessment          Today's date: 2022   # of Exercise Sessions Completed: 12  Patient name: Terrie Thapa      : 1965  Age: 60 y o  MRN: [id number]  Referring Physician: Lakeshia Dugan  Cardiologist: Lakeshia Dugan   Provider: Dottie Marcos  Clinician: Katheran Kocher, MS, CEP    Dx:   Encounter Diagnosis   Name Primary?  S/P CABG x 3 Yes     Date of onset: 3/9/2022      SUMMARY OF PROGRESS:   2022: 30 Day ITP for Franca Valdivia  [facility] is compliant attending CR 3x/wk  He has attended 12 sessions so far averaging 35 mins at 3 1-3 7 mets  Resting HR's 72-87  EX HR's   Resting BP's 110//74  EX BP's 120//76  BS Values   He admits that he has felt increases in his strength, especially his arms, increased stamina and energy  He admits that his recent A1C was improved at 6 6%  He denies pain or discomfort with exercise  He admits his incision is healing well can causing no complications  Home exercise includes: 15 mins of outdoor walking 2x/day 7x/wk  He admits he is following a heart healthy, low sodium diet  He has cut out frozen foods and is eating mostly fresh foods and more chicken and turkey  Telemetry reveals NSR  Patient's goals include: return to previous activities/work with increased strength, stamina and endurance, improved BS values, gain muscle mass, increase consumption of low fat/lean turkey, chicken and vegetables and reduce consumption of potatoes/portion control  PHQ9 improved to 1 and GAD7 = 3  Will continue to monitor  2022: Today is Claude Ruts initial evaluation to begin Cardiac Rehab post CABG x3 and s/p AVR  The patient does currently follow a formal exercise program at home, including outdoor walking  He has resumed all ADLs following sternal restrictions  Depression screening using the PHQ-9 interprets the patient's score 5-9 = Mild Depression   MELI-7 screening tool for anxiety suggests 0-4  = Not anxious  When addressed, the patient denies having depression/anxiety  Patient reports excellent social/emotional support  Information to begin using [employer] was provided as well as contact information for counseling through [employer]  PHQ-9 score will be reassessed in 30 days  The patient is a former smoker  Patient admits to 100% medication compliance  Patient reports the following physical limitations: L leg BKA  The patient completed an initial submaximal NuStep ETT  The patient completed 3 minutes of stage 4 (3 37METs) with test termination of RPE 6  Resting  //60 with appropriate hemodynamic response to exercise reaching 110//62  Patient denied symptoms during exercise  Telemetry revealed NSR  Pt arrived with bandage/gauze covering incision reporting some drainage  He admits MD is aware and has f/u visit scheduled for later today  Patient was counseled on exercise guidelines to achieve a minimum of 150 mins/wk of moderate intensity (RPE 4-6) exercise and encouraged to add 1-2 days of exercise on opposite days of cardiac rehab as tolerated  We discussed current dietary habits and goals of heart healthy eating for lipid management and diabetes management  The patient has T2D, diet controlled, Patient reported fasting , on Insulin   Patient's goals include: return to previous activities/work with increased strength, stamina and endurance, improved BS values, gain muscle mass, increase consumption of low fat/lean turkey, chicken and vegetables and reduce consumption of potatoes/portion control  The patient's CAD risk factors include:  hypertension, hyperlipidemia and diabetes  His education will focus on lifestyle modification/education specific to His needs  Patient will attend group education classes on heart healthy eating, reading food labels, stress management, risk factor reduction, understanding heart disease and common heart medications  Patient will attend 35 monitored exercise sessions, 3x/wk for 12-18 weeks beginning 2022  Medication compliance: Yes   Comments: Pt reports to be compliant with medications  Fall Risk: Low   Comments: Ambulates with a steady gait with no assist device    EKG Interpretation: NSR      EXERCISE ASSESSMENT and PLAN    Current Exercise Program in Rehab:       Frequency: 3 days/week Supplement with home exercise 2+ days/wk as tolerated       Minutes: 35         METS: 3 1-3 7            HR:    RPE: 4-6         Modalities: UBE, NuStep and Recumbent bike      Exercise Progression 30 Day Goals :    Frequency: 3 days/week of cardiac rehab     Supplement with home exercise 2+ days/wk as tolerated    Minutes: 35-40                           >150 mins/wk of moderate intensity exercise   METS: 3 2-5 0   HR:     RPE: 4-6   Modalities: UBE, NuStep and Recumbent bike    Strength trainin-3 days / week  12-15 repetitions  1-2 sets per modality   Will be added following at least 8 weeks post surgery and 8-10 monitored sessions   Modalities: Arm Curl, Upright Rows, Front Raises and Shoulder Shrugs    Home Exercise: Type: outdoor walking 15-30mins    Goals: Exercise 5 days/wk, >150mins/wk of moderate intensity exercise, Resume ADLs with increased strength, Return to work unrestricted, Attend Rehab regularly and start a home exercise program    Progression Toward Goals:  Pt is progressing and showing improvement  toward the following goals:  attending CR regularly, exercise 7days/wk, increasing strength   , Will continue to educate and progress as tolerated      Education: benefit of exercise for CAD risk factors, AHA guidelines to achieve >150 mins/wk of moderate exercise, RPE scale and class: Risk Factors for Heart Disease   Plan:home exercise 30+ mins 2 days opposite CR  Readiness to change: Action:  (Changing behavior)      NUTRITION ASSESSMENT AND PLAN    Weight control:    Starting weight: 166 5   Current weight:   169  Waist circumference:    Startin 5   Current:      Diabetes: T2D, diet controlled, Patient reported fasting , on Insulin   A1c: n/a    last measured: n/a    Lipid management: Discussed diet and lipid management and Last lipid profile 2022  Chol 134  TRG 51  HDL 49  LDL 75    Goals:fasting BG , improved A1c  < 7 0%, Eat 4-5 cups of fruits and vegetables daily, choose healthy snacks: light popcorn, plain pretzels, seldom eat or choose low fat ice-cream, fruit juice bars or frozen yogurt  and eliminate or choose low-fat sweets    Progression Toward Goals: Pt is progressing and showing improvement  toward the following goals:  more frutis and veggies  , Will continue to educate and progress as tolerated , Goals met: improved A1C  Education: heart healthy eating  low sodium diet  nutrition for  lipid management  nutrition for Improved BG control  Plan: Education class: [employer] Food Labels, Education Class: Heart Healthy Eating, eat fewer desserts and sweets and monitor home blood glucose  Readiness to change: Action:  (Changing behavior)      PSYCHOSOCIAL ASSESSMENT AND PLAN    Emotional:  Depression assessment:  PHQ-9 = 1-4 = Minimal Depression            Anxiety measure:  MELI-7 = 0-4  = Not anxious  Self-reported stress level:  6  Social support: Patient reports excellent emotional/social support from family    Goals:  Reduce perceived stress to 1-3/10, improved [facility] QOL < 27, PHQ-9 - reduced severity by one level, Social Support in [place] Score < 3 and increased energy    Progression Toward Goals: Pt is progressing and showing improvement  toward the following goals:  PHq9 reduced, increasing energy  , Will continue to educate and progress as tolerated      Education: signs/sxs of depression, benefits of a positive support system, stress management techniques, class:  Stress and Your Health  and class:  Relaxation  Plan: Refer to [employer] [employer], Exercise, Enjoy a hobby and Repeat PHQ-9 every 30 days if score >5  Readiness to change: Action:  (Changing behavior)      OTHER CORE COMPONENTS     Tobacco:   Social History     Tobacco Use   Smoking Status Former Smoker    Quit date:     Years since quittin 3   Smokeless Tobacco Never Used       Tobacco Use Intervention:   Pt quit in    and has abstained    Anginal Symptoms:  None   NTG use: No prescription    Blood pressure:    Restin//74   Exercise: 120//76    Goals: consistent BP < 130/80, moderate intensity exercise >150 mins/wk and medication compliance    Progression Toward Goals: Pt is progressing and showing improvement  toward the following goals:  consistent BP at rest, 150 m/wk, med complaince   , Will continue to educate and progress as tolerated      Education:  understanding high blood pressure and it's relationship to CAD and low sodium diet and HTN  Plan: Class: Understanding Heart Disease, Class: Common Heart Medications, engage in regular exercise, monitor home BP and continue with salt free diet  Readiness to change: Action:  (Changing behavior)

## 2022-05-13 ENCOUNTER — CLINICAL SUPPORT (OUTPATIENT)
Dept: CARDIAC REHAB | Facility: CLINIC | Age: 57
End: 2022-05-13
Payer: COMMERCIAL

## 2022-05-13 DIAGNOSIS — Z95.1 S/P CABG X 3: Primary | ICD-10-CM

## 2022-05-13 PROCEDURE — 93798 PHYS/QHP OP CAR RHAB W/ECG: CPT

## 2022-05-16 ENCOUNTER — CLINICAL SUPPORT (OUTPATIENT)
Dept: CARDIAC REHAB | Facility: CLINIC | Age: 57
End: 2022-05-16
Payer: COMMERCIAL

## 2022-05-16 DIAGNOSIS — Z95.1 S/P CABG X 3: Primary | ICD-10-CM

## 2022-05-16 PROCEDURE — 93798 PHYS/QHP OP CAR RHAB W/ECG: CPT

## 2022-05-18 ENCOUNTER — CLINICAL SUPPORT (OUTPATIENT)
Dept: CARDIAC REHAB | Facility: CLINIC | Age: 57
End: 2022-05-18
Payer: COMMERCIAL

## 2022-05-18 DIAGNOSIS — Z95.1 S/P CABG X 3: Primary | ICD-10-CM

## 2022-05-18 PROCEDURE — 93798 PHYS/QHP OP CAR RHAB W/ECG: CPT

## 2022-05-19 DIAGNOSIS — Z79.4 TYPE 2 DIABETES MELLITUS WITH OTHER SPECIFIED COMPLICATION, WITH LONG-TERM CURRENT USE OF INSULIN (HCC): Primary | ICD-10-CM

## 2022-05-19 DIAGNOSIS — E11.69 TYPE 2 DIABETES MELLITUS WITH OTHER SPECIFIED COMPLICATION, WITH LONG-TERM CURRENT USE OF INSULIN (HCC): Primary | ICD-10-CM

## 2022-05-20 ENCOUNTER — CLINICAL SUPPORT (OUTPATIENT)
Dept: CARDIAC REHAB | Facility: CLINIC | Age: 57
End: 2022-05-20
Payer: COMMERCIAL

## 2022-05-20 DIAGNOSIS — Z95.1 S/P CABG X 3: Primary | ICD-10-CM

## 2022-05-20 PROCEDURE — 93798 PHYS/QHP OP CAR RHAB W/ECG: CPT

## 2022-05-23 ENCOUNTER — CLINICAL SUPPORT (OUTPATIENT)
Dept: CARDIAC REHAB | Facility: CLINIC | Age: 57
End: 2022-05-23
Payer: COMMERCIAL

## 2022-05-23 DIAGNOSIS — Z95.1 S/P CABG X 3: Primary | ICD-10-CM

## 2022-05-23 PROCEDURE — 93798 PHYS/QHP OP CAR RHAB W/ECG: CPT

## 2022-05-25 ENCOUNTER — CLINICAL SUPPORT (OUTPATIENT)
Dept: CARDIAC REHAB | Facility: CLINIC | Age: 57
End: 2022-05-25
Payer: COMMERCIAL

## 2022-05-25 DIAGNOSIS — Z95.1 S/P CABG X 3: Primary | ICD-10-CM

## 2022-05-25 PROCEDURE — 93798 PHYS/QHP OP CAR RHAB W/ECG: CPT

## 2022-05-27 ENCOUNTER — CLINICAL SUPPORT (OUTPATIENT)
Dept: CARDIAC REHAB | Facility: CLINIC | Age: 57
End: 2022-05-27
Payer: COMMERCIAL

## 2022-05-27 DIAGNOSIS — Z95.1 S/P CABG X 3: Primary | ICD-10-CM

## 2022-05-27 PROCEDURE — 93798 PHYS/QHP OP CAR RHAB W/ECG: CPT

## 2022-06-01 ENCOUNTER — CLINICAL SUPPORT (OUTPATIENT)
Dept: CARDIAC REHAB | Facility: CLINIC | Age: 57
End: 2022-06-01
Payer: COMMERCIAL

## 2022-06-01 DIAGNOSIS — Z95.1 S/P CABG X 3: Primary | ICD-10-CM

## 2022-06-01 PROCEDURE — 93798 PHYS/QHP OP CAR RHAB W/ECG: CPT

## 2022-06-03 ENCOUNTER — CLINICAL SUPPORT (OUTPATIENT)
Dept: CARDIAC REHAB | Facility: CLINIC | Age: 57
End: 2022-06-03
Payer: COMMERCIAL

## 2022-06-03 DIAGNOSIS — Z95.1 S/P CABG X 3: Primary | ICD-10-CM

## 2022-06-03 PROCEDURE — 93798 PHYS/QHP OP CAR RHAB W/ECG: CPT

## 2022-06-06 ENCOUNTER — CLINICAL SUPPORT (OUTPATIENT)
Dept: CARDIAC REHAB | Facility: CLINIC | Age: 57
End: 2022-06-06
Payer: COMMERCIAL

## 2022-06-06 DIAGNOSIS — Z95.1 S/P CABG X 3: Primary | ICD-10-CM

## 2022-06-06 PROCEDURE — 93798 PHYS/QHP OP CAR RHAB W/ECG: CPT

## 2022-06-08 ENCOUNTER — CLINICAL SUPPORT (OUTPATIENT)
Dept: CARDIAC REHAB | Facility: CLINIC | Age: 57
End: 2022-06-08
Payer: COMMERCIAL

## 2022-06-08 DIAGNOSIS — Z95.1 S/P CABG X 3: Primary | ICD-10-CM

## 2022-06-08 PROCEDURE — 93798 PHYS/QHP OP CAR RHAB W/ECG: CPT

## 2022-06-08 NOTE — PROGRESS NOTES
Cardiac Rehabilitation Plan of Care   60 Day Reassessment          Today's date: 2022   # of Exercise Sessions Completed: 23  Patient name: Nilesh Parrish      : 1965  Age: 60 y o  MRN: [id number]  Referring Physician: Demetris Pleitez  Cardiologist: Demetris Pleitez   Provider: [facility]  Clinician: Cecelia Sierra MS, CEP    Dx:   Encounter Diagnosis   Name Primary?  S/P CABG x 3 Yes     Date of onset: 3/9/2022      SUMMARY OF PROGRESS:   2022: 60 Day ITP for Song Candelaria is compliant attending CR 3x/wk  He has attended 23 sessions so far averaging 35-40 mins at 3 2-4 9 mets  Resting HR's 66-77  EX HR's 85-96  Resting BP's 108//76  EX BP's 130//76  BS Values   He admits that he has felt increases in his strength, especially his arms, increased stamina and energy  Light strength training will be given in the next few sessions  He denies pain or discomfort with exercise  Home exercise includes: 15 mins of outdoor walking 1x/day 7x/wk  He admits he is following a heart healthy, low sodium diet  He has cut out frozen foods and is eating mostly fresh foods and more chicken and turkey  Encouraged patient to try fish  Telemetry reveals NSR  He hopes to be cleared to return to work this month  Current weight is 172, a gain of 3 lbs in last 30 days  He admits 100% medication compliance  He reports an increase in dosage of his Warfarin to 2 5mg  Patient recently switched cardiologist's and will now be following with Dr Silvio Dixon  Patient's goals include: return to previous activities/work with increased strength, stamina and endurance, improved BS values, gain muscle mass, increase consumption of low fat/lean turkey, chicken and vegetables and reduce consumption of potatoes/portion control  All goals in progress  Will continue to monitor  2022: 30 Day ITP for Song Candelaria is compliant attending CR 3x/wk   He has attended 12 sessions so far averaging 35 mins at 3 1-3 7 mets  Resting HR's 72-87  EX HR's   Resting BP's 110//74  EX BP's 120//76  BS Values   He admits that he has felt increases in his strength, especially his arms, increased stamina and energy  He admits that his recent A1C was improved at 6 6%  He denies pain or discomfort with exercise  He admits his incision is healing well can causing no complications  Home exercise includes: 15 mins of outdoor walking 2x/day 7x/wk  He admits he is following a heart healthy, low sodium diet  He has cut out frozen foods and is eating mostly fresh foods and more chicken and turkey  Telemetry reveals NSR  Patient's goals include: return to previous activities/work with increased strength, stamina and endurance, improved BS values, gain muscle mass, increase consumption of low fat/lean turkey, chicken and vegetables and reduce consumption of potatoes/portion control  PHQ9 improved to 1 and GAD7 = 3  Will continue to monitor  4/14/2022: Today is Himanshu Corado initial evaluation to begin Cardiac Rehab post CABG x3 and s/p AVR  The patient does currently follow a formal exercise program at home, including outdoor walking  He has resumed all ADLs following sternal restrictions  Depression screening using the PHQ-9 interprets the patient's score 5-9 = Mild Depression  MELI-7 screening tool for anxiety suggests 0-4  = Not anxious  When addressed, the patient denies having depression/anxiety  Patient reports excellent social/emotional support  Information to begin using Silvia AF83 was provided as well as contact information for counseling through [employer]  PHQ-9 score will be reassessed in 30 days  The patient is a former smoker  Patient admits to 100% medication compliance  Patient reports the following physical limitations: L leg BKA  The patient completed an initial submaximal NuStep ETT  The patient completed 3 minutes of stage 4 (3 37METs) with test termination of RPE 6      Resting //60 with appropriate hemodynamic response to exercise reaching 110//62  Patient denied symptoms during exercise  Telemetry revealed NSR  Pt arrived with bandage/gauze covering incision reporting some drainage  He admits MD is aware and has f/u visit scheduled for later today  Patient was counseled on exercise guidelines to achieve a minimum of 150 mins/wk of moderate intensity (RPE 4-6) exercise and encouraged to add 1-2 days of exercise on opposite days of cardiac rehab as tolerated  We discussed current dietary habits and goals of heart healthy eating for lipid management and diabetes management  The patient has T2D, diet controlled, Patient reported fasting , on Insulin   Patient's goals include: return to previous activities/work with increased strength, stamina and endurance, improved BS values, gain muscle mass, increase consumption of low fat/lean turkey, chicken and vegetables and reduce consumption of potatoes/portion control  The patient's CAD risk factors include:  hypertension, hyperlipidemia and diabetes  His education will focus on lifestyle modification/education specific to His needs  Patient will attend group education classes on heart healthy eating, reading food labels, stress management, risk factor reduction, understanding heart disease and common heart medications  Patient will attend 35 monitored exercise sessions, 3x/wk for 12-18 weeks beginning 4/18/2022         Medication compliance: Yes   Comments: Pt reports to be compliant with medications  Fall Risk: Low   Comments: Ambulates with a steady gait with no assist device    EKG Interpretation: NSR      EXERCISE ASSESSMENT and PLAN    Current Exercise Program in Rehab:       Frequency: 3 days/week Supplement with home exercise 2+ days/wk as tolerated       Minutes: 35-40         METS: 2 6-4 9           HR:    RPE: 4-6         Modalities: UBE, Lifecycle, NuStep and Recumbent bike      Exercise Progression 30 Day Goals :    Frequency: 3 days/week of cardiac rehab     Supplement with home exercise 2+ days/wk as tolerated    Minutes: 35-40                           >150 mins/wk of moderate intensity exercise   METS: 2 7-5 5   HR:     RPE: 4-6   Modalities: UBE, Lifecycle, NuStep and Recumbent bike    Strength trainin-3 days / week  12-15 repetitions  1-2 sets per modality   Will be added following at least 8 weeks post surgery and 8-10 monitored sessions   Modalities: Arm Curl, Upright Rows, Front Raises and Shoulder Shrugs    Home Exercise: Type: outdoor walking 15 daily    Goals: Exercise 5 days/wk, >150mins/wk of moderate intensity exercise, Resume ADLs with increased strength, Return to work unrestricted, Attend Rehab regularly and start a home exercise program    Progression Toward Goals:  Pt is progressing and showing improvement  toward the following goals:  attending CR regularly, exercise 7days/wk, increasing strength   , Will continue to educate and progress as tolerated      Education: benefit of exercise for CAD risk factors, AHA guidelines to achieve >150 mins/wk of moderate exercise, RPE scale and class: Risk Factors for Heart Disease   Plan:home exercise 30+ mins 2 days opposite CR  Readiness to change: Action:  (Changing behavior)      NUTRITION ASSESSMENT AND PLAN    Weight control:    Starting weight: 166 5   Current weight:   172  Waist circumference:    Startin 5   Current:      Diabetes: T2D, diet controlled, Patient reported fasting , on Insulin   A1c: n/a    last measured: n/a    Lipid management: Discussed diet and lipid management and Last lipid profile 2022  Chol 134  TRG 51  HDL 49  LDL 75    Goals:fasting BG , improved A1c  < 7 0%, Eat 4-5 cups of fruits and vegetables daily, choose healthy snacks: light popcorn, plain pretzels, seldom eat or choose low fat ice-cream, fruit juice bars or frozen yogurt  and eliminate or choose low-fat sweets    Progression Toward Goals: Pt is progressing and showing improvement  toward the following goals:  more frutis and veggies  , Will continue to educate and progress as tolerated , Goals met: improved A1C  Education: heart healthy eating  low sodium diet  nutrition for  lipid management  nutrition for Improved BG control  education class: Heart Healthy Eating  education class:  Label Reading  Plan: eat fewer desserts and sweets and monitor home blood glucose  Readiness to change: Action:  (Changing behavior)      PSYCHOSOCIAL ASSESSMENT AND PLAN    Emotional:  Depression assessment:  PHQ-9 = 1-4 = Minimal Depression            Anxiety measure:  MELI-7 = 0-4  = Not anxious  Self-reported stress level:  6  Social support: Patient reports excellent emotional/social support from family    Goals:  Reduce perceived stress to 1-3/10, improved [facility] QOL < 27, PHQ-9 - reduced severity by one level, Social Support in [facility] Score < 3 and increased energy    Progression Toward Goals: Pt is progressing and showing improvement  toward the following goals:  PHq9 reduced, increasing energy  , Will continue to educate and progress as tolerated      Education: signs/sxs of depression, benefits of a positive support system, stress management techniques, class:  Stress and Your Health  and class:  Relaxation  Plan: Refer to Silvia Plata, Exercise, Enjoy a hobby and Repeat PHQ-9 every 30 days if score >5  Readiness to change: Action:  (Changing behavior)      OTHER CORE COMPONENTS     Tobacco:   Social History     Tobacco Use   Smoking Status Former Smoker    Quit date:     Years since quittin 4   Smokeless Tobacco Never Used       Tobacco Use Intervention:   Pt quit in    and has abstained    Anginal Symptoms:  None   NTG use: No prescription    Blood pressure:    Restin//76   Exercise: 130//76    Goals: consistent BP < 130/80, moderate intensity exercise >150 mins/wk and medication compliance    Progression Toward Goals: Pt is progressing and showing improvement  toward the following goals:  consistent BP at rest, 150 m/wk, med complaince   , Will continue to educate and progress as tolerated      Education:  understanding high blood pressure and it's relationship to CAD, low sodium diet and HTN and Education class:  Common Heart Medications  Plan: Class: Understanding Heart Disease, engage in regular exercise, monitor home BP and continue with salt free diet  Readiness to change: Action:  (Changing behavior)

## 2022-06-09 ENCOUNTER — CONSULT (OUTPATIENT)
Dept: CARDIOLOGY CLINIC | Facility: CLINIC | Age: 57
End: 2022-06-09
Payer: COMMERCIAL

## 2022-06-09 VITALS
BODY MASS INDEX: 28.56 KG/M2 | HEIGHT: 67 IN | TEMPERATURE: 97 F | OXYGEN SATURATION: 98 % | DIASTOLIC BLOOD PRESSURE: 80 MMHG | WEIGHT: 182 LBS | SYSTOLIC BLOOD PRESSURE: 120 MMHG | HEART RATE: 67 BPM

## 2022-06-09 DIAGNOSIS — I42.0 ISCHEMIC CONGESTIVE CARDIOMYOPATHY (HCC): ICD-10-CM

## 2022-06-09 DIAGNOSIS — I73.9 PVD (PERIPHERAL VASCULAR DISEASE) (HCC): ICD-10-CM

## 2022-06-09 DIAGNOSIS — I11.0 HYPERTENSIVE HEART DISEASE WITH CHRONIC COMBINED SYSTOLIC AND DIASTOLIC CONGESTIVE HEART FAILURE (HCC): ICD-10-CM

## 2022-06-09 DIAGNOSIS — Z95.2 S/P AVR (AORTIC VALVE REPLACEMENT): ICD-10-CM

## 2022-06-09 DIAGNOSIS — L97.509 TYPE 2 DIABETES MELLITUS WITH FOOT ULCER, UNSPECIFIED WHETHER LONG TERM INSULIN USE (HCC): ICD-10-CM

## 2022-06-09 DIAGNOSIS — E78.5 DYSLIPIDEMIA: ICD-10-CM

## 2022-06-09 DIAGNOSIS — Z95.1 S/P CABG X 3: ICD-10-CM

## 2022-06-09 DIAGNOSIS — I50.42 HYPERTENSIVE HEART DISEASE WITH CHRONIC COMBINED SYSTOLIC AND DIASTOLIC CONGESTIVE HEART FAILURE (HCC): ICD-10-CM

## 2022-06-09 DIAGNOSIS — I48.0 PAROXYSMAL ATRIAL FIBRILLATION (HCC): ICD-10-CM

## 2022-06-09 DIAGNOSIS — I25.5 ISCHEMIC CONGESTIVE CARDIOMYOPATHY (HCC): ICD-10-CM

## 2022-06-09 DIAGNOSIS — Z89.512 S/P UNILATERAL BKA (BELOW KNEE AMPUTATION), LEFT (HCC): ICD-10-CM

## 2022-06-09 DIAGNOSIS — E11.621 TYPE 2 DIABETES MELLITUS WITH FOOT ULCER, UNSPECIFIED WHETHER LONG TERM INSULIN USE (HCC): ICD-10-CM

## 2022-06-09 PROCEDURE — 1036F TOBACCO NON-USER: CPT | Performed by: INTERNAL MEDICINE

## 2022-06-09 PROCEDURE — 3079F DIAST BP 80-89 MM HG: CPT | Performed by: INTERNAL MEDICINE

## 2022-06-09 PROCEDURE — 93000 ELECTROCARDIOGRAM COMPLETE: CPT | Performed by: INTERNAL MEDICINE

## 2022-06-09 PROCEDURE — 3008F BODY MASS INDEX DOCD: CPT | Performed by: INTERNAL MEDICINE

## 2022-06-09 PROCEDURE — 3074F SYST BP LT 130 MM HG: CPT | Performed by: INTERNAL MEDICINE

## 2022-06-09 PROCEDURE — 99215 OFFICE O/P EST HI 40 MIN: CPT | Performed by: INTERNAL MEDICINE

## 2022-06-09 RX ORDER — FUROSEMIDE 40 MG/1
40 TABLET ORAL DAILY
COMMUNITY
End: 2022-06-09 | Stop reason: SDUPTHER

## 2022-06-09 RX ORDER — FUROSEMIDE 20 MG/1
20 TABLET ORAL DAILY
Qty: 90 TABLET | Refills: 1 | Status: SHIPPED | OUTPATIENT
Start: 2022-06-09

## 2022-06-09 RX ORDER — AMIODARONE HYDROCHLORIDE 200 MG/1
100 TABLET ORAL DAILY
Qty: 45 TABLET | Refills: 1 | Status: SHIPPED | OUTPATIENT
Start: 2022-06-09

## 2022-06-09 NOTE — PATIENT INSTRUCTIONS
Echo Doppler for baseline valve numbers   Holter monitor to rule out any occult atrial fib   decrease amiodarone to 100 mg daily for   enrolled in [facility] and target INR is 2-3   check electrolytes   will try him on Jardiance 10 mg daily and decrease his Lasix to 20 mg daily  Encouraged him to drink more water  he is advised to weigh himself daily and call us if any symptoms of dizziness or lightheadedness      Follow-up in 4-6 weeks

## 2022-06-09 NOTE — PROGRESS NOTES
Progress Note - Cardiology Office  [facility]    Stephon Murray 60 y o  male MRN: [id number]  : 1965  Encounter: [id number]      Assessment:     1  Hypertensive heart disease with chronic combined systolic and diastolic congestive heart failure ([facility] )    2  Ischemic congestive cardiomyopathy ([facility] )    3  S/P AVR (aortic valve replacement) with bioprosthetic valve 23 mm at [facility]    4  Paroxysmal atrial fibrillation (HCC)    5  Dyslipidemia    6  S/P CABG x 3    7  Type 2 diabetes mellitus with foot ulcer, unspecified whether long term insulin use ([facility] )    8  PVD (peripheral vascular disease) ([place] [address] )    9  S/P unilateral BKA (below knee amputation), left [facility])        Discussion Summary and Plan:      1  Hypertensive heart disease with chronic combined systolic and diastolic heart failure  Patient's EF is 35-40%  Currently he is on Coreg, valsartan and Lasix  Along with Aldactone there is no evidence of any volume overload  Will check electrolytes  2  Ischemic cardiomyopathy  EF 35 40% LV was dilated mild-to-moderate MR this echo was done before the surgery  He has aortic valve replacement surgery we will schedule him for echo for baseline valve  Numbers  3  Postoperative atrial fibrillation  Patient had postoperative atrial fibrillation he was on amiodarone  Will decrease the dose to 100 mg check Holter monitor he is already on Coumadin he will be in  Enrolled in our [facility]madin Clinic  He may not need Coumadin for long period time based on his Holter monitor result  4  Dyslipidemia  Continue statins check fasting lipids  5  Coronary artery disease status post CABG  Cardiac catheterization in 2022 shows left main stenosis, ostial circumflex and ostial RCA underwent successful  Three-vessel bypass with LIMA to LAD vein graft to RCA and and circumflex  6  Type 2 diabetes mellitus  Management as per medical team     7   PVD with history of left BKA   Patient is doing cardiac rehab  Plan  Echo Doppler for baseline valve numbers   Holter monitor to rule out any occult atrial fib   decrease amiodarone to 100 mg daily for   enrolled in [facility] Clinic and target INR is 2-3   check electrolytes   will try him on Jardiance 10 mg daily and decrease his Lasix to 20 mg daily  Encouraged him to drink more water  he is advised to weigh himself daily and call us if any symptoms of dizziness or lightheadedness  Follow-up in 4-6 weeks    Patient was advised and educated to call our office  immediately if  patient has any new symptoms of chest pain/shortness of breath, near-syncope, syncope, light headedness sustained palpitations  or any other cardiovascular symptoms before their scheduled follow-up appointment  Office #[phone]  Please call [phone] if any questions  Counseling :  A description of the counseling  Goals and Barriers  Patient's ability to self care: Yes  Medication side effect reviewed with patient in detail and all their questions answered to their satisfaction  HPI :     Estephanie Murray is a 60y o  year old male who came for follow up  Patient was admitted to [facility] in February 23 2022 with pulmonary edema and bilateral pleural effusion  He has medical history significant for hypertensive heart disease, diabetes mellitus, dyslipidemia, cardiac murmur, peripheral vascular disease with left BKA  He also required BiPAP support in the emergency room  CT of the chest was done which shows he did not reveal any pulmonary embolism but noted to have pulmonary edema  Echo shows EF 35 40%  Patient was recommended further cardiac catheterization and was transferred to [facility] for further cardiac workup  Echo Doppler February 2022 shows patient has EF 35 40%, mild LVH, mild-to-moderate aortic stenosis mild TR and mild-to-moderate MR      After his transferred to [facility]   Cardiac catheterization shows distal left main 50%, 50-60% proximal long left knee descending artery, 95% ostial circumflex and at least moderate ostial RCA stenosis with mid RCA around 70% stenosis  Moderate aortic stenosis by echo  Patient underwent surgical AVR and CABG on March 9, 2022 with LIMA to LAD, vein graft to RCA and circ as well as replacement of aortic valve with 23 mm valve  Postoperatively he has episode of atrial fibrillation he was on pace around for some time  As well  He has been doing cardiac rehab  He has no issues doing it  He does have history of left BKA due to vascular disease  He does not smoke  His surgery was on 25th 20[facility] at [facility]     Review of Systems   Constitutional: Negative for activity change, chills, diaphoresis, fever and unexpected weight change  HENT: Negative for congestion  Eyes: Negative for discharge and redness  Respiratory: Negative for cough, chest tightness, shortness of breath and wheezing  Cardiovascular: Negative  Negative for chest pain, palpitations and leg swelling  Gastrointestinal: Negative for abdominal pain, diarrhea and nausea  Endocrine: Negative  Genitourinary: Negative for decreased urine volume and urgency  Musculoskeletal: Negative  Negative for arthralgias, back pain and gait problem  Skin: Negative for rash and wound  Allergic/Immunologic: Negative  Neurological: Negative for dizziness, seizures, syncope, weakness, light-headedness and headaches  Hematological: Negative  Psychiatric/Behavioral: Negative for agitation and confusion  The patient is not nervous/anxious          Historical Information   Past Medical History:   Diagnosis Date    Diabetes mellitus ([address] )     Hyperlipidemia     Hypertension      Past Surgical History:   Procedure Laterality Date    BELOW KNEE LEG AMPUTATION      IR THORACENTESIS  2/23/2022     Social History     Substance and Sexual Activity   Alcohol Use Not Currently Social History     Substance and Sexual Activity   Drug Use Never     Social History     Tobacco Use   Smoking Status Former Smoker    Quit date:     Years since quittin 4   Smokeless Tobacco Never Used     Family History:   Family History   Problem Relation Age of Onset    No Known Problems Mother        Meds/Allergies     Allergies   Allergen Reactions    Bee Venom Anaphylaxis     Reaction Date: 2005;     Penicillins Rash     Reaction Date: 2005;        Current Outpatient Medications:     amiodarone 200 mg tablet, Take 0 5 tablets (100 mg total) by mouth daily, Disp: 45 tablet, Rfl: 1    aspirin (ECOTRIN LOW STRENGTH) 81 mg EC tablet, Take 1 tablet (81 mg total) by mouth daily, Disp: , Rfl: 0    B-D UF III MINI PEN NEEDLES 31G X 5 MM MISC, USE AS DIRECTED, Disp: 50 each, Rfl: 0    CHUYITA MICROLET LANCETS lancets, by Does not apply route, Disp: , Rfl:     carvedilol (COREG) 3 125 mg tablet, TAKE 1 TABLET (3 125 MG TOTAL) BY MOUTH 2 TIMES A DAY WITH MEALS, Disp: , Rfl:     Empagliflozin (Jardiance) 10 MG TABS, Take 1 tablet (10 mg total) by mouth every morning, Disp: 90 tablet, Rfl: 1    furosemide (LASIX) 20 mg tablet, Take 1 tablet (20 mg total) by mouth daily, Disp: 90 tablet, Rfl: 1    insulin glargine (Basaglar KwikPen) 100 units/mL injection pen, Inject 68 Units under the skin daily at bedtime, Disp: 15 mL, Rfl: 1    Insulin Pen Needle 31G X 5 MM MISC, Inject under the skin 4 (four) times a day, Disp: 360 each, Rfl: 3    rosuvastatin (CRESTOR) 20 MG tablet, Take 20 mg by mouth daily, Disp: , Rfl:     spironolactone (ALDACTONE) 25 mg tablet, Take 25 mg by mouth daily, Disp: , Rfl:     valsartan (DIOVAN) 40 mg tablet, Take 40 mg by mouth daily, Disp: , Rfl:     warfarin (COUMADIN) 2 5 mg tablet, Take 3 mg by mouth daily, Disp: , Rfl:     insulin lispro (HumaLOG) 100 units/mL injection, Inject 1-5 Units under the skin daily at bedtime (Patient not taking: Reported on 6/9/2022), Disp: , Rfl: 0    insulin lispro (HumaLOG) 100 units/mL injection, Inject 1-6 Units under the skin 3 (three) times a day before meals (Patient not taking: Reported on 6/9/2022), Disp: , Rfl: 0    Vitals: Blood pressure 120/80, pulse 67, temperature (!) 97 °F (36 1 °C), height 5' 7" (1 702 m), weight 82 6 kg (182 lb), SpO2 98 %  ?  Body mass index is 28 51 kg/m²  Wt Readings from Last 3 Encounters:   06/09/22 82 6 kg (182 lb)   04/28/22 77 2 kg (170 lb 3 2 oz)   02/23/22 79 8 kg (176 lb)     Vitals:    06/09/22 1249   Weight: 82 6 kg (182 lb)     BP Readings from Last 3 Encounters:   06/09/22 120/80   04/28/22 114/70   02/23/22 113/62       Physical Exam:  Neurologic:  Alert & oriented x 3, no new focal deficits, Not in any acute distress,  Constitutional:  Adequate built, non-toxic appearance   Neck: Normal range of motion, no tenderness,  Neck supple   Respiratory:  Bilateral air entry, mostly clear to auscultation  Cardiovascular: S1-S2 regular with a 2/6 ejection systolic murmur and S4 is present  GI:  Soft, nondistended,  nontender, no hepatosplenomegaly appreciated  Musculoskeletal:  No edema, no tenderness, no deformities  Skin:  Well hydrated, no rash   Extremities:  No edema and  Left BKA  Psychiatric:  Speech and behavior appropriate         Diagnostic Studies Review Cardio:  Limited echo in [facility] in March 2022:  Normal left ventricular wall thickness  Left ventricular cavity size is moderate dilated  There is moderate global hypokinesis of the left ventricle  The calculated left ventricular ejection fraction is 35-40 %  Echo Doppler in February 2022 shows normal LV thickness, LV size mildly dilated, EF 75%, grade 2 diastolic dysfunction, mild-to-moderate TR, moderate aortic stenosis      Diagnostic cardiac catheterization done at [facility] on 02/25/2022 shows multivessel coronary artery disease involving distal left main bifurcation, peak to peak gradient across aortic valve was 22 mm of mercury  Transradial cardiac catheterization performed at this morning shows a smooth tapering 50% distal left main stenosis leading into a 50-60% proximal long left anterior descending stenosis and a 95% ostial circumflex stenosis  Lynda Richards is also at least moderate to severe ostial stenosis of a moderate caliber RCA and a long diffuse 70% lesion in the mid RCA      Moderate AS was detected on ECHO in the setting of depressed EF and he went for surgical AVR/CABG with Elan on March 9th, 2022  Surgery: CABG x3 W/ LIMA, RSVG (ENDOSCOPIC HARVEST), REPLACEMENT  AORTIC VALVE W/ 23MM ON-X AORTIC VALVE  EKG:  Twelve lead EKG done on 06/09/2022 normal sinus rhythm heart rate 67 beats per minute LVH by voltage nonspecific ST changes        Lab Results   Component Value Date    WBC 12 71 (H) 02/23/2022    HGB 12 4 02/23/2022    HCT 42 6 02/23/2022     (H) 02/23/2022    RDW 14 8 02/23/2022     02/23/2022     BMP:  Lab Results   Component Value Date    SODIUM 144 02/23/2022    K 3 9 02/23/2022     02/23/2022    CO2 31 02/23/2022    BUN 14 02/23/2022    CREATININE 0 92 02/23/2022    GLUC 260 (H) 02/23/2022    CALCIUM 8 1 (L) 02/23/2022    CORRECTEDCA 9 1 02/23/2022    EGFR 91 02/23/2022     Troponins:    LFT:  Lab Results   Component Value Date    AST 22 02/23/2022    ALT 32 02/23/2022    ALKPHOS 99 02/23/2022    TP 7 1 02/23/2022    ALB 2 8 (L) 02/23/2022        Lab Results   Component Value Date    HGBA1C 6 5 (H) 02/23/2022     Lipid Profile:   Lab Results   Component Value Date    CHOLESTEROL 134 02/23/2022    HDL 49 02/23/2022    LDLCALC 75 02/23/2022    TRIG 51 02/23/2022     Lab Results   Component Value Date    CHOLESTEROL 134 02/23/2022    CHOLESTEROL 179 07/01/2021     No results found for: CKTOTAL, CKMB, CKMBINDEX, TROPONINI  Lab Results   Component Value Date    NTBNP 3,146 (H) 02/23/2022      No results found for this or any previous visit (from the past 672 hour(s))  Dr Panda Dill MD [facility]      "This note has been constructed using a voice recognition system  Therefore there may be syntax, spelling, and/or grammatical errors   Please call if you have any questions  "

## 2022-06-10 ENCOUNTER — CLINICAL SUPPORT (OUTPATIENT)
Dept: CARDIAC REHAB | Facility: CLINIC | Age: 57
End: 2022-06-10
Payer: COMMERCIAL

## 2022-06-10 DIAGNOSIS — Z95.1 S/P CABG X 3: Primary | ICD-10-CM

## 2022-06-10 PROCEDURE — 93798 PHYS/QHP OP CAR RHAB W/ECG: CPT

## 2022-06-13 ENCOUNTER — CLINICAL SUPPORT (OUTPATIENT)
Dept: CARDIAC REHAB | Facility: CLINIC | Age: 57
End: 2022-06-13
Payer: COMMERCIAL

## 2022-06-13 DIAGNOSIS — Z95.1 S/P CABG X 3: Primary | ICD-10-CM

## 2022-06-13 PROCEDURE — 93798 PHYS/QHP OP CAR RHAB W/ECG: CPT

## 2022-06-15 ENCOUNTER — CLINICAL SUPPORT (OUTPATIENT)
Dept: CARDIAC REHAB | Facility: CLINIC | Age: 57
End: 2022-06-15
Payer: COMMERCIAL

## 2022-06-15 DIAGNOSIS — Z95.1 S/P CABG X 3: Primary | ICD-10-CM

## 2022-06-15 PROCEDURE — 93798 PHYS/QHP OP CAR RHAB W/ECG: CPT

## 2022-06-15 NOTE — PROGRESS NOTES
See scanned exercise session detailed report 21 yo M no pmhx here with chest pain and URI sxs x 3 days. Pt states the chest pain is L sided, sharp, worse with movement or touching area. Pt states approx. 4 weeks ago he was working at the gym and a weight fell on the right side of his chest and he felt the same chest pain to that area but did not get evaluated. Pt states the pain was improving over the last 4 weeks until this past week when he developed cough, congestion, sore throat 23 yo M no pmhx here with chest pain and URI sxs x 3 days. Pt states the chest pain is L sided, sharp, worse with movement or touching area. Pt states approx. 4 weeks ago he was working at the gym and a weight fell on the right side of his chest and he felt the same chest pain to that area but did not get evaluated. Pt states the pain was improving over the last 4 weeks until this past week when he developed cough, congestion, sore throat. States pain is worse with touching the area or with movement. Has not taken any meds. Normal PO. Denies travel, denies long car rides, denies hx of pe or clot. denies recent surgery, denies personal hx or family hx of cardiac dz. Denies fever, chills, SOB, vomiting, diarrhea, dizziness, HA, vision changes

## 2022-06-17 ENCOUNTER — CLINICAL SUPPORT (OUTPATIENT)
Dept: CARDIAC REHAB | Facility: CLINIC | Age: 57
End: 2022-06-17
Payer: COMMERCIAL

## 2022-06-17 DIAGNOSIS — Z95.1 S/P CABG X 3: Primary | ICD-10-CM

## 2022-06-17 PROCEDURE — 93798 PHYS/QHP OP CAR RHAB W/ECG: CPT

## 2022-06-20 ENCOUNTER — CLINICAL SUPPORT (OUTPATIENT)
Dept: CARDIAC REHAB | Facility: CLINIC | Age: 57
End: 2022-06-20
Payer: COMMERCIAL

## 2022-06-20 DIAGNOSIS — Z95.1 S/P CABG X 3: Primary | ICD-10-CM

## 2022-06-20 PROCEDURE — 93798 PHYS/QHP OP CAR RHAB W/ECG: CPT

## 2022-06-22 ENCOUNTER — CLINICAL SUPPORT (OUTPATIENT)
Dept: CARDIAC REHAB | Facility: CLINIC | Age: 57
End: 2022-06-22
Payer: COMMERCIAL

## 2022-06-22 DIAGNOSIS — Z95.1 S/P CABG X 3: Primary | ICD-10-CM

## 2022-06-22 PROCEDURE — 93798 PHYS/QHP OP CAR RHAB W/ECG: CPT

## 2022-06-23 ENCOUNTER — TELEPHONE (OUTPATIENT)
Dept: CARDIOLOGY CLINIC | Facility: CLINIC | Age: 57
End: 2022-06-23

## 2022-06-23 LAB
ALBUMIN SERPL-MCNC: 4.3 G/DL (ref 3.8–4.9)
ALBUMIN/GLOB SERPL: 1.6 {RATIO} (ref 1.2–2.2)
ALP SERPL-CCNC: 100 IU/L (ref 44–121)
ALT SERPL-CCNC: 21 IU/L (ref 0–44)
AST SERPL-CCNC: 21 IU/L (ref 0–40)
BASOPHILS # BLD AUTO: 0.1 X10E3/UL (ref 0–0.2)
BASOPHILS NFR BLD AUTO: 1 %
BILIRUB SERPL-MCNC: 0.3 MG/DL (ref 0–1.2)
BUN SERPL-MCNC: 33 MG/DL (ref 6–24)
BUN/CREAT SERPL: 27 (ref 9–20)
CALCIUM SERPL-MCNC: 9.3 MG/DL (ref 8.7–10.2)
CHLORIDE SERPL-SCNC: 105 MMOL/L (ref 96–106)
CHOLEST SERPL-MCNC: 171 MG/DL (ref 100–199)
CO2 SERPL-SCNC: 23 MMOL/L (ref 20–29)
CREAT SERPL-MCNC: 1.21 MG/DL (ref 0.76–1.27)
EGFR: 69 ML/MIN/1.73
EOSINOPHIL # BLD AUTO: 0.2 X10E3/UL (ref 0–0.4)
EOSINOPHIL NFR BLD AUTO: 3 %
ERYTHROCYTE [DISTWIDTH] IN BLOOD BY AUTOMATED COUNT: 14.3 % (ref 11.6–15.4)
GLOBULIN SER-MCNC: 2.7 G/DL (ref 1.5–4.5)
GLUCOSE SERPL-MCNC: 101 MG/DL (ref 65–99)
HCT VFR BLD AUTO: 37.8 % (ref 37.5–51)
HDLC SERPL-MCNC: 58 MG/DL
HGB BLD-MCNC: 12.6 G/DL (ref 13–17.7)
IMM GRANULOCYTES # BLD: 0 X10E3/UL (ref 0–0.1)
IMM GRANULOCYTES NFR BLD: 1 %
LDLC SERPL CALC-MCNC: 90 MG/DL (ref 0–99)
LYMPHOCYTES # BLD AUTO: 1 X10E3/UL (ref 0.7–3.1)
LYMPHOCYTES NFR BLD AUTO: 16 %
MCH RBC QN AUTO: 29.8 PG (ref 26.6–33)
MCHC RBC AUTO-ENTMCNC: 33.3 G/DL (ref 31.5–35.7)
MCV RBC AUTO: 89 FL (ref 79–97)
MONOCYTES # BLD AUTO: 0.5 X10E3/UL (ref 0.1–0.9)
MONOCYTES NFR BLD AUTO: 9 %
NEUTROPHILS # BLD AUTO: 4.2 X10E3/UL (ref 1.4–7)
NEUTROPHILS NFR BLD AUTO: 70 %
PLATELET # BLD AUTO: 226 X10E3/UL (ref 150–450)
POTASSIUM SERPL-SCNC: 5.2 MMOL/L (ref 3.5–5.2)
PROT SERPL-MCNC: 7 G/DL (ref 6–8.5)
RBC # BLD AUTO: 4.23 X10E6/UL (ref 4.14–5.8)
SL AMB VLDL CHOLESTEROL CALC: 23 MG/DL (ref 5–40)
SODIUM SERPL-SCNC: 143 MMOL/L (ref 134–144)
TRIGL SERPL-MCNC: 129 MG/DL (ref 0–149)
TSH SERPL DL<=0.005 MIU/L-ACNC: 1.44 UIU/ML (ref 0.45–4.5)
WBC # BLD AUTO: 6 X10E3/UL (ref 3.4–10.8)

## 2022-06-23 NOTE — TELEPHONE ENCOUNTER
----- Message from Jacinto Evans MD sent at 6/23/2022 12:30 PM EDT -----  Patient blood test reviewed  They are acceptable  Will continue same medication  Patient should keep his appointment for follow-up  Patient's cholesterol has improved  Though hemoglobin is noted to be lower    He should contact his medical doctor

## 2022-06-24 ENCOUNTER — CLINICAL SUPPORT (OUTPATIENT)
Dept: CARDIAC REHAB | Facility: CLINIC | Age: 57
End: 2022-06-24
Payer: COMMERCIAL

## 2022-06-24 DIAGNOSIS — Z95.1 S/P CABG X 3: Primary | ICD-10-CM

## 2022-06-24 PROCEDURE — 93798 PHYS/QHP OP CAR RHAB W/ECG: CPT

## 2022-06-26 DIAGNOSIS — E11.51 DIABETES MELLITUS WITH PERIPHERAL CIRCULATORY DISORDER, CONTROLLED (HCC): ICD-10-CM

## 2022-06-27 ENCOUNTER — CLINICAL SUPPORT (OUTPATIENT)
Dept: CARDIAC REHAB | Facility: CLINIC | Age: 57
End: 2022-06-27
Payer: COMMERCIAL

## 2022-06-27 DIAGNOSIS — Z95.1 S/P CABG X 3: Primary | ICD-10-CM

## 2022-06-27 PROCEDURE — 93798 PHYS/QHP OP CAR RHAB W/ECG: CPT

## 2022-06-27 RX ORDER — INSULIN GLARGINE 100 [IU]/ML
68 INJECTION, SOLUTION SUBCUTANEOUS
Qty: 15 ML | Refills: 5 | Status: SHIPPED | OUTPATIENT
Start: 2022-06-27

## 2022-06-29 ENCOUNTER — APPOINTMENT (OUTPATIENT)
Dept: CARDIAC REHAB | Facility: CLINIC | Age: 57
End: 2022-06-29
Payer: COMMERCIAL

## 2022-07-01 ENCOUNTER — CLINICAL SUPPORT (OUTPATIENT)
Dept: CARDIAC REHAB | Facility: CLINIC | Age: 57
End: 2022-07-01
Payer: COMMERCIAL

## 2022-07-01 DIAGNOSIS — Z95.1 S/P CABG X 3: Primary | ICD-10-CM

## 2022-07-01 PROCEDURE — 93798 PHYS/QHP OP CAR RHAB W/ECG: CPT

## 2022-07-06 ENCOUNTER — APPOINTMENT (OUTPATIENT)
Dept: CARDIAC REHAB | Facility: CLINIC | Age: 57
End: 2022-07-06
Payer: COMMERCIAL

## 2022-07-06 NOTE — PROGRESS NOTES
Cardiac Rehabilitation Plan of Care   90 Day Reassessment          Today's date: 2022   # of Exercise Sessions Completed: 32  Patient name: Coleen Barboza      : 1965  Age: 59 y o  MRN: [id number]  Referring Physician: Gucci Garrido  Cardiologist: Gucci Garrido   Provider: Crescencio Alejandro  Clinician: Raffaele Ruano MS, CEP    Dx:   Encounter Diagnosis   Name Primary?  S/P CABG x 3 Yes     Date of onset: 3/9/2022      SUMMARY OF PROGRESS:   2022: 90 Day ITP for Shana Blancas is compliant attending CR 3x/wk  He has attended 32 sessions so far averaging 40 mins at 3 3-4 3 mets with light strength training component  Resting HR's 65-78  EX HR's 86-95  Resting BP's 100//68  EX BP's 118//74  BS Values   He admits that he has felt increases in his strength, especially his arms, increased stamina and energy  He denies pain or discomfort with exercise  Home exercise includes: 15 mins of outdoor walking 1x/day 7x/wk  He admits he is following a heart healthy, low sodium diet  He has cut out frozen foods and is eating mostly fresh foods and more chicken and turkey  Telemetry reveals NSR  He has been cleared to return to work  Current weight is 174, a gain of 2 lbs in last 30 days  He admits 100% medication compliance  Patient's goals include: return to previous activities/work with increased strength, stamina and endurance, improved BS values, gain muscle mass, increase consumption of low fat/lean turkey, chicken and vegetables and reduce consumption of potatoes/portion control  All goals in progress  Will continue to monitor  2022: 60 Day ITP for Shana Blancas is compliant attending CR 3x/wk  He has attended 23 sessions so far averaging 35-40 mins at 3 2-4 9 mets  Resting HR's 66-77  EX HR's 85-96  Resting BP's 108//76  EX BP's 130//76  BS Values    He admits that he has felt increases in his strength, especially his arms, increased stamina and energy  Light strength training will be given in the next few sessions  He denies pain or discomfort with exercise  Home exercise includes: 15 mins of outdoor walking 1x/day 7x/wk  He admits he is following a heart healthy, low sodium diet  He has cut out frozen foods and is eating mostly fresh foods and more chicken and turkey  Encouraged patient to try fish  Telemetry reveals NSR  He hopes to be cleared to return to work this month  Current weight is 172, a gain of 3 lbs in last 30 days  He admits 100% medication compliance  He reports an increase in dosage of his Warfarin to 2 5mg  Patient recently switched cardiologist's and will now be following with Dr Jamaal Kowalski  Patient's goals include: return to previous activities/work with increased strength, stamina and endurance, improved BS values, gain muscle mass, increase consumption of low fat/lean turkey, chicken and vegetables and reduce consumption of potatoes/portion control  All goals in progress  Will continue to monitor  5/11/2022: 30 Day ITP for Nasir Montano is compliant attending CR 3x/wk  He has attended 12 sessions so far averaging 35 mins at 3 1-3 7 mets  Resting HR's 72-87  EX HR's   Resting BP's 110//74  EX BP's 120//76  BS Values   He admits that he has felt increases in his strength, especially his arms, increased stamina and energy  He admits that his recent A1C was improved at 6 6%  He denies pain or discomfort with exercise  He admits his incision is healing well can causing no complications  Home exercise includes: 15 mins of outdoor walking 2x/day 7x/wk  He admits he is following a heart healthy, low sodium diet  He has cut out frozen foods and is eating mostly fresh foods and more chicken and turkey  Telemetry reveals NSR   Patient's goals include: return to previous activities/work with increased strength, stamina and endurance, improved BS values, gain muscle mass, increase consumption of low fat/lean turkey, chicken and vegetables and reduce consumption of potatoes/portion control  PHQ9 improved to 1 and GAD7 = 3  Will continue to monitor  4/14/2022: Today is Gypsy Umana initial evaluation to begin Cardiac Rehab post CABG x3 and s/p AVR  The patient does currently follow a formal exercise program at home, including outdoor walking  He has resumed all ADLs following sternal restrictions  Depression screening using the PHQ-9 interprets the patient's score 5-9 = Mild Depression  MELI-7 screening tool for anxiety suggests 0-4  = Not anxious  When addressed, the patient denies having depression/anxiety  Patient reports excellent social/emotional support  Information to begin using Sonics was provided as well as contact information for counseling through [facility]  PHQ-9 score will be reassessed in 30 days  The patient is a former smoker  Patient admits to 100% medication compliance  Patient reports the following physical limitations: L leg BKA  The patient completed an initial submaximal NuStep ETT  The patient completed 3 minutes of stage 4 (3 37METs) with test termination of RPE 6  Resting  //60 with appropriate hemodynamic response to exercise reaching 110//62  Patient denied symptoms during exercise  Telemetry revealed NSR  Pt arrived with bandage/gauze covering incision reporting some drainage  He admits MD is aware and has f/u visit scheduled for later today  Patient was counseled on exercise guidelines to achieve a minimum of 150 mins/wk of moderate intensity (RPE 4-6) exercise and encouraged to add 1-2 days of exercise on opposite days of cardiac rehab as tolerated  We discussed current dietary habits and goals of heart healthy eating for lipid management and diabetes management  The patient has T2D, diet controlled, Patient reported fasting , on Insulin     Patient's goals include: return to previous activities/work with increased strength, stamina and endurance, improved BS values, gain muscle mass, increase consumption of low fat/lean turkey, chicken and vegetables and reduce consumption of potatoes/portion control  The patient's CAD risk factors include:  hypertension, hyperlipidemia and diabetes  His education will focus on lifestyle modification/education specific to His needs  Patient will attend group education classes on heart healthy eating, reading food labels, stress management, risk factor reduction, understanding heart disease and common heart medications  Patient will attend 35 monitored exercise sessions, 3x/wk for 12-18 weeks beginning 2022         Medication compliance: Yes   Comments: Pt reports to be compliant with medications  Fall Risk: Low   Comments: Ambulates with a steady gait with no assist device    EKG Interpretation: NSR      EXERCISE ASSESSMENT and PLAN    Current Exercise Program in Rehab:       Frequency: 3 days/week Supplement with home exercise 2+ days/wk as tolerated       Minutes: 40        METS: 3 3-4 3          HR:    RPE: 4-6         Modalities: UBE, Lifecycle, NuStep and Recumbent bike      Exercise Progression 30 Day Goals :    Frequency: 3 days/week of cardiac rehab     Supplement with home exercise 2+ days/wk as tolerated    Minutes: 40                      >150 mins/wk of moderate intensity exercise   METS: 3 4-5 0   HR:     RPE: 4-6   Modalities: UBE, Lifecycle, NuStep and Recumbent bike    Strength trainin-3 days / week  12-15 repetitions  1-2 sets per modality   Will be added following at least 8 weeks post surgery and 8-10 monitored sessions   Modalities: Arm Curl, Upright Rows, Front Raises and Shoulder Shrugs    Home Exercise: Type: outdoor walking 15 daily    Goals: Exercise 5 days/wk, >150mins/wk of moderate intensity exercise, Resume ADLs with increased strength, Return to work unrestricted, Attend Rehab regularly and start a home exercise program    Progression Toward Goals:  Pt is progressing and showing improvement  toward the following goals:  attending CR regularly, exercise 7days/wk, increasing strength   , Will continue to educate and progress as tolerated  Education: benefit of exercise for CAD risk factors, AHA guidelines to achieve >150 mins/wk of moderate exercise, RPE scale and class: Risk Factors for Heart Disease   Plan:home exercise 30+ mins 2 days opposite CR  Readiness to change: Action:  (Changing behavior)      NUTRITION ASSESSMENT AND PLAN    Weight control:    Starting weight: 166 5   Current weight:   172  Waist circumference:    Startin 5   Current:      Diabetes: T2D, diet controlled, Patient reported fasting , on Insulin   A1c: n/a    last measured: n/a    Lipid management: Discussed diet and lipid management and Last lipid profile 2022  Chol 134  TRG 51  HDL 49  LDL 75    Goals:fasting BG , improved A1c  < 7 0%, Eat 4-5 cups of fruits and vegetables daily, choose healthy snacks: light popcorn, plain pretzels, seldom eat or choose low fat ice-cream, fruit juice bars or frozen yogurt  and eliminate or choose low-fat sweets    Progression Toward Goals: Pt is progressing and showing improvement  toward the following goals:  more frutis and veggies  , Will continue to educate and progress as tolerated , Goals met: improved A1C      Education: heart healthy eating  low sodium diet  nutrition for  lipid management  nutrition for Improved BG control  education class: Heart Healthy Eating  education class:  Label Reading  Plan: eat fewer desserts and sweets and monitor home blood glucose  Readiness to change: Action:  (Changing behavior)      PSYCHOSOCIAL ASSESSMENT AND PLAN    Emotional:  Depression assessment:  PHQ-9 = 1-4 = Minimal Depression            Anxiety measure:  MELI-7 = 0-4  = Not anxious  Self-reported stress level:  6  Social support: Patient reports excellent emotional/social support from family    Goals:  Reduce perceived stress to 1-3/10, improved [facility] QOL < 27, PHQ-9 - reduced severity by one level, Social Support in [facility] Score < 3 and increased energy    Progression Toward Goals: Pt is progressing and showing improvement  toward the following goals:  PHq9 reduced, increasing energy  , Will continue to educate and progress as tolerated  Education: signs/sxs of depression, benefits of a positive support system, stress management techniques, class:  Stress and Your Health  and class:  Relaxation  Plan: Refer to Silvia Plata, Exercise, Enjoy a hobby and Repeat PHQ-9 every 30 days if score >5  Readiness to change: Action:  (Changing behavior)      OTHER CORE COMPONENTS     Tobacco:   Social History     Tobacco Use   Smoking Status Former Smoker    Quit date:     Years since quittin 5   Smokeless Tobacco Never Used       Tobacco Use Intervention:   Pt quit in    and has abstained    Anginal Symptoms:  None   NTG use: No prescription    Blood pressure:    Restin//68   Exercise: 118//74  Goals: consistent BP < 130/80, moderate intensity exercise >150 mins/wk and medication compliance    Progression Toward Goals: Pt is progressing and showing improvement  toward the following goals:  consistent BP at rest, 150 m/wk, med complaince   , Will continue to educate and progress as tolerated      Education:  understanding high blood pressure and it's relationship to CAD, low sodium diet and HTN and Education class:  Common Heart Medications  Plan: Class: Understanding Heart Disease, engage in regular exercise, monitor home BP and continue with salt free diet  Readiness to change: Action:  (Changing behavior)

## 2022-07-08 ENCOUNTER — CLINICAL SUPPORT (OUTPATIENT)
Dept: CARDIAC REHAB | Facility: CLINIC | Age: 57
End: 2022-07-08
Payer: COMMERCIAL

## 2022-07-08 DIAGNOSIS — Z95.1 S/P CABG X 3: Primary | ICD-10-CM

## 2022-07-08 PROCEDURE — 93798 PHYS/QHP OP CAR RHAB W/ECG: CPT

## 2022-07-11 ENCOUNTER — APPOINTMENT (OUTPATIENT)
Dept: CARDIAC REHAB | Facility: CLINIC | Age: 57
End: 2022-07-11
Payer: COMMERCIAL

## 2022-07-14 NOTE — TELEPHONE ENCOUNTER
Did ok  A refill-insurance will only cover a #90 day refill-asked pharmacy to pass along message pt needs his labs done --lj What Type Of Note Output Would You Prefer (Optional)?: Standard Output How Severe Are Your Spot(S)?: mild Have Your Spot(S) Been Treated In The Past?: has not been treated Hpi Title: Evaluation of Skin Lesions

## 2022-07-18 ENCOUNTER — APPOINTMENT (OUTPATIENT)
Dept: CARDIAC REHAB | Facility: CLINIC | Age: 57
End: 2022-07-18
Payer: COMMERCIAL

## 2022-07-22 ENCOUNTER — APPOINTMENT (OUTPATIENT)
Dept: CARDIAC REHAB | Facility: CLINIC | Age: 57
End: 2022-07-22
Payer: COMMERCIAL

## 2022-08-01 ENCOUNTER — OFFICE VISIT (OUTPATIENT)
Dept: FAMILY MEDICINE CLINIC | Facility: CLINIC | Age: 57
End: 2022-08-01
Payer: COMMERCIAL

## 2022-08-01 VITALS
RESPIRATION RATE: 18 BRPM | HEIGHT: 67 IN | DIASTOLIC BLOOD PRESSURE: 72 MMHG | OXYGEN SATURATION: 98 % | WEIGHT: 188.2 LBS | BODY MASS INDEX: 29.54 KG/M2 | SYSTOLIC BLOOD PRESSURE: 124 MMHG | TEMPERATURE: 97.5 F | HEART RATE: 70 BPM

## 2022-08-01 DIAGNOSIS — E78.2 MIXED HYPERLIPIDEMIA: Chronic | ICD-10-CM

## 2022-08-01 DIAGNOSIS — I10 BENIGN ESSENTIAL HYPERTENSION: Chronic | ICD-10-CM

## 2022-08-01 DIAGNOSIS — D64.9 MILD ANEMIA: ICD-10-CM

## 2022-08-01 DIAGNOSIS — E11.9 TYPE 2 DIABETES MELLITUS WITHOUT COMPLICATION, WITH LONG-TERM CURRENT USE OF INSULIN (HCC): Primary | ICD-10-CM

## 2022-08-01 DIAGNOSIS — Z79.4 TYPE 2 DIABETES MELLITUS WITHOUT COMPLICATION, WITH LONG-TERM CURRENT USE OF INSULIN (HCC): Primary | ICD-10-CM

## 2022-08-01 DIAGNOSIS — I50.22 CHRONIC SYSTOLIC CONGESTIVE HEART FAILURE (HCC): ICD-10-CM

## 2022-08-01 PROBLEM — L97.509 DIABETES MELLITUS WITH ULCER OF FOOT (HCC): Status: RESOLVED | Noted: 2020-08-19 | Resolved: 2022-08-01

## 2022-08-01 PROBLEM — E11.621 DIABETES MELLITUS WITH ULCER OF FOOT (HCC): Status: RESOLVED | Noted: 2020-08-19 | Resolved: 2022-08-01

## 2022-08-01 LAB — SL AMB POCT HEMOGLOBIN AIC: 6.5 (ref ?–6.5)

## 2022-08-01 PROCEDURE — 83036 HEMOGLOBIN GLYCOSYLATED A1C: CPT | Performed by: INTERNAL MEDICINE

## 2022-08-01 PROCEDURE — 3078F DIAST BP <80 MM HG: CPT | Performed by: INTERNAL MEDICINE

## 2022-08-01 PROCEDURE — 3725F SCREEN DEPRESSION PERFORMED: CPT | Performed by: INTERNAL MEDICINE

## 2022-08-01 PROCEDURE — 99214 OFFICE O/P EST MOD 30 MIN: CPT | Performed by: INTERNAL MEDICINE

## 2022-08-01 PROCEDURE — 3044F HG A1C LEVEL LT 7.0%: CPT | Performed by: INTERNAL MEDICINE

## 2022-08-01 PROCEDURE — 3074F SYST BP LT 130 MM HG: CPT | Performed by: INTERNAL MEDICINE

## 2022-08-01 NOTE — PROGRESS NOTES
Assessment/Plan:    1  Type 2 diabetes mellitus without complication, with long-term current use of insulin [facility])  Assessment & Plan:    Lab Results   Component Value Date    HGBA1C 6 5 (H) 02/23/2022     A1c done here in the office shows 6 5  Will continue current medications, well controlled  Discussed need for good foot and eye care  Orders:  -     POCT hemoglobin A1c  -     CBC and differential; Future; Expected date: 12/01/2022  -     Comprehensive metabolic panel; Future; Expected date: 12/01/2022  -     Lipid panel; Future; Expected date: 12/01/2022  -     HEMOGLOBIN A1C W/ EAG ESTIMATION; Future; Expected date: 12/01/2022  -     CBC and differential  -     Comprehensive metabolic panel  -     Lipid panel  -     HEMOGLOBIN A1C W/ EAG ESTIMATION    2  Benign essential hypertension  Assessment & Plan:  Well controlled on current medications and will continue  Orders:  -     CBC and differential; Future; Expected date: 12/01/2022  -     Comprehensive metabolic panel; Future; Expected date: 12/01/2022  -     Lipid panel; Future; Expected date: 12/01/2022  -     CBC and differential  -     Comprehensive metabolic panel  -     Lipid panel    3  Mixed hyperlipidemia  Assessment & Plan:  Reviewed labs with patient and lipids are at goal, continue rosuvastatin as ordered  Orders:  -     CBC and differential; Future; Expected date: 12/01/2022  -     Comprehensive metabolic panel; Future; Expected date: 12/01/2022  -     Lipid panel; Future; Expected date: 12/01/2022  -     CBC and differential  -     Comprehensive metabolic panel  -     Lipid panel    4  Chronic systolic congestive heart failure [facility])  Assessment & Plan:  Wt Readings from Last 3 Encounters:   08/01/22 85 4 kg (188 lb 3 2 oz)   06/09/22 82 6 kg (182 lb)   04/28/22 77 2 kg (170 lb 3 2 oz)       Clinically euvolemic  Continue lasix as ordered  Has upcoming echocardiogram ordered through Dr Fiorella Frederick         5  Mild anemia  -     CBC and differential; Future; Expected date: 12/01/2022  -     Iron; Future; Expected date: 12/01/2022  -     CBC and differential  -     Iron            There are no Patient Instructions on file for this visit  Return in about 4 months (around 12/1/2022)  Subjective:      Patient ID: Chen Hilliard is a 59 y o  male  Chief Complaint   Patient presents with    Follow-up     Diabetes f/u nm lpn       Here for follow up of DM  He has not been taking his mealtime insulin but continues on his basalglar and jardiance  Continues to follow closely with cardiology and denies chest pain, dyspnea, cough, fever  Has upcoming echo ordered  The following portions of the patient's history were reviewed and updated as appropriate: allergies, current medications, past family history, past medical history, past social history, past surgical history and problem list     Review of Systems   Constitutional: Negative  Respiratory: Negative  Cardiovascular: Negative  Endocrine: Negative            Current Outpatient Medications   Medication Sig Dispense Refill    amiodarone 200 mg tablet Take 0 5 tablets (100 mg total) by mouth daily 45 tablet 1    aspirin (ECOTRIN LOW STRENGTH) 81 mg EC tablet Take 1 tablet (81 mg total) by mouth daily  0    B-D UF III MINI PEN NEEDLES 31G X 5 MM MISC USE AS DIRECTED 50 each 0    Basaglar KwikPen 100 units/mL injection pen INJECT 68 UNITS UNDER THE SKIN DAILY AT BEDTIME 15 mL 5    CHUYITA MICROLET LANCETS lancets by Does not apply route      carvedilol (COREG) 3 125 mg tablet TAKE 1 TABLET (3 125 MG TOTAL) BY MOUTH 2 TIMES A DAY WITH MEALS      Empagliflozin (Jardiance) 10 MG TABS Take 1 tablet (10 mg total) by mouth every morning 90 tablet 1    furosemide (LASIX) 20 mg tablet Take 1 tablet (20 mg total) by mouth daily 90 tablet 1    Insulin Pen Needle 31G X 5 MM MISC Inject under the skin 4 (four) times a day 360 each 3    rosuvastatin (CRESTOR) 20 MG tablet Take 20 mg by mouth daily      spironolactone (ALDACTONE) 25 mg tablet Take 25 mg by mouth daily      valsartan (DIOVAN) 40 mg tablet Take 40 mg by mouth daily      warfarin (COUMADIN) 2 5 mg tablet Take 3 mg by mouth daily       No current facility-administered medications for this visit  Objective:    /72   Pulse 70   Temp 97 5 °F (36 4 °C)   Resp 18   Ht 5' 7" (1 702 m)   Wt 85 4 kg (188 lb 3 2 oz)   SpO2 98%   BMI 29 48 kg/m²        Physical Exam  Constitutional:       Appearance: He is well-developed  Eyes:      Conjunctiva/sclera: Conjunctivae normal    Neck:      Thyroid: No thyromegaly  Vascular: No JVD  Cardiovascular:      Rate and Rhythm: Normal rate and regular rhythm  Heart sounds: Normal heart sounds  No murmur heard  No friction rub  No gallop  Pulmonary:      Effort: Pulmonary effort is normal       Breath sounds: Normal breath sounds  No wheezing or rales  Abdominal:      General: Bowel sounds are normal  There is no distension  Palpations: Abdomen is soft  Tenderness: There is no abdominal tenderness  Musculoskeletal:      Cervical back: Neck supple                  Lavina Gosselin, MD

## 2022-08-01 NOTE — ASSESSMENT & PLAN NOTE
Lab Results   Component Value Date    HGBA1C 6 5 (H) 02/23/2022     A1c done here in the office shows 6 5  Will continue current medications, well controlled  Discussed need for good foot and eye care

## 2022-08-01 NOTE — ASSESSMENT & PLAN NOTE
Wt Readings from Last 3 Encounters:   08/01/22 85 4 kg (188 lb 3 2 oz)   06/09/22 82 6 kg (182 lb)   04/28/22 77 2 kg (170 lb 3 2 oz)       Clinically euvolemic  Continue lasix as ordered  Has upcoming echocardiogram ordered through Dr Magi Macias

## 2022-08-05 NOTE — PROGRESS NOTES
Cardiac Rehabilitation Plan of Care   Discharge          Today's date: 2022   # of Exercise Sessions Completed: 33  Patient name: Kiko Andrade      : 1965  Age: 59 y o  MRN: [id number]  Referring Physician: Maddie Calles  Cardiologist: Maddie Calles   Provider: Puma  Clinician: Kelly Mayo RN    Dx:   Encounter Diagnosis   Name Primary?  S/P CABG x 3 Yes     Date of onset: 3/9/2022      SUMMARY OF PROGRESS:   2022 Discharge summary  Jason Cabral has been discharged from the cardiac rehabilitation program  Unable to complete outcome evaluation due to patient has not returned to cardiac rehabilitation since session 33 on 2022  He has not returned phone calls in regards to missed sessions  He has returned to work  He will continue to follow up with his cardiologist  He averaged 40 mins at 3 3-4 3 mets with light strength training component  Resting HR's 65-78  EX HR's 86-95  Resting BP's 100//68  EX BP's 118//74  BS Values   He admits that he has felt increases in his strength, especially his arms, increased stamina and energy  He denies pain or discomfort with exercise  Home exercise includes: 15 mins of outdoor walking 1x/day 7x/wk  He admits he is following a heart healthy, low sodium diet  He has cut out frozen foods and is eating mostly fresh foods and more chicken and turkey  Telemetry reveals NSR  He has been cleared to return to work  Current weight is 174, a gain of 2 lbs in last 30 days  He admits 100% medication compliance  Patient's goals include: return to previous activities/work with increased strength, stamina and endurance, improved BS values, gain muscle mass, increase consumption of low fat/lean turkey, chicken and vegetables and reduce consumption of potatoes/portion control  All goals in progress  He has been discharged from the cardiac rehabilitation program      2022: 90 Day ITP for Jason Sit   Ephraim Marcelino is compliant attending CR 3x/wk  He has attended 32 sessions so far averaging 40 mins at 3 3-4 3 mets with light strength training component  Resting HR's 65-78  EX HR's 86-95  Resting BP's 100//68  EX BP's 118//74  BS Values   He admits that he has felt increases in his strength, especially his arms, increased stamina and energy  He denies pain or discomfort with exercise  Home exercise includes: 15 mins of outdoor walking 1x/day 7x/wk  He admits he is following a heart healthy, low sodium diet  He has cut out frozen foods and is eating mostly fresh foods and more chicken and turkey  Telemetry reveals NSR  He has been cleared to return to work  Current weight is 174, a gain of 2 lbs in last 30 days  He admits 100% medication compliance  Patient's goals include: return to previous activities/work with increased strength, stamina and endurance, improved BS values, gain muscle mass, increase consumption of low fat/lean turkey, chicken and vegetables and reduce consumption of potatoes/portion control  All goals in progress  Will continue to monitor  6/8/2022: 60 Day ITP for [place] Clark is compliant attending CR 3x/wk  He has attended 23 sessions so far averaging 35-40 mins at 3 2-4 9 mets  Resting HR's 66-77  EX HR's 85-96  Resting BP's 108//76  EX BP's 130//76  BS Values   He admits that he has felt increases in his strength, especially his arms, increased stamina and energy  Light strength training will be given in the next few sessions  He denies pain or discomfort with exercise  Home exercise includes: 15 mins of outdoor walking 1x/day 7x/wk  He admits he is following a heart healthy, low sodium diet  He has cut out frozen foods and is eating mostly fresh foods and more chicken and turkey  Encouraged patient to try fish  Telemetry reveals NSR  He hopes to be cleared to return to work this month  Current weight is 172, a gain of 3 lbs in last 30 days  He admits 100% medication compliance   He reports an increase in dosage of his Warfarin to 2 5mg  Patient recently switched cardiologist's and will now be following with Dr Marcela Xiong  Patient's goals include: return to previous activities/work with increased strength, stamina and endurance, improved BS values, gain muscle mass, increase consumption of low fat/lean turkey, chicken and vegetables and reduce consumption of potatoes/portion control  All goals in progress  Will continue to monitor  5/11/2022: 30 Day ITP for [place] Bray is compliant attending CR 3x/wk  He has attended 12 sessions so far averaging 35 mins at 3 1-3 7 mets  Resting HR's 72-87  EX HR's   Resting BP's 110//74  EX BP's 120//76  BS Values   He admits that he has felt increases in his strength, especially his arms, increased stamina and energy  He admits that his recent A1C was improved at 6 6%  He denies pain or discomfort with exercise  He admits his incision is healing well can causing no complications  Home exercise includes: 15 mins of outdoor walking 2x/day 7x/wk  He admits he is following a heart healthy, low sodium diet  He has cut out frozen foods and is eating mostly fresh foods and more chicken and turkey  Telemetry reveals NSR  Patient's goals include: return to previous activities/work with increased strength, stamina and endurance, improved BS values, gain muscle mass, increase consumption of low fat/lean turkey, chicken and vegetables and reduce consumption of potatoes/portion control  PHQ9 improved to 1 and GAD7 = 3  Will continue to monitor  4/14/2022: Today is [place] initial evaluation to begin Cardiac Rehab post CABG x3 and s/p AVR  The patient does currently follow a formal exercise program at home, including outdoor walking  He has resumed all ADLs following sternal restrictions  Depression screening using the PHQ-9 interprets the patient's score 5-9 = Mild Depression   MELI-7 screening tool for anxiety suggests 0-4  = Not anxious  When addressed, the patient denies having depression/anxiety  Patient reports excellent social/emotional support  Information to begin using [employer] was provided as well as contact information for counseling through [facility]  PHQ-9 score will be reassessed in 30 days  The patient is a former smoker  Patient admits to 100% medication compliance  Patient reports the following physical limitations: L leg BKA  The patient completed an initial submaximal NuStep ETT  The patient completed 3 minutes of stage 4 (3 37METs) with test termination of RPE 6  Resting  //60 with appropriate hemodynamic response to exercise reaching 110//62  Patient denied symptoms during exercise  Telemetry revealed NSR  Pt arrived with bandage/gauze covering incision reporting some drainage  He admits MD is aware and has f/u visit scheduled for later today  Patient was counseled on exercise guidelines to achieve a minimum of 150 mins/wk of moderate intensity (RPE 4-6) exercise and encouraged to add 1-2 days of exercise on opposite days of cardiac rehab as tolerated  We discussed current dietary habits and goals of heart healthy eating for lipid management and diabetes management  The patient has T2D, diet controlled, Patient reported fasting , on Insulin   Patient's goals include: return to previous activities/work with increased strength, stamina and endurance, improved BS values, gain muscle mass, increase consumption of low fat/lean turkey, chicken and vegetables and reduce consumption of potatoes/portion control  The patient's CAD risk factors include:  hypertension, hyperlipidemia and diabetes  His education will focus on lifestyle modification/education specific to His needs  Patient will attend group education classes on heart healthy eating, reading food labels, stress management, risk factor reduction, understanding heart disease and common heart medications    Patient will attend 35 monitored exercise sessions, 3x/wk for 12-18 weeks beginning 2022  Medication compliance: Yes   Comments: Pt reports to be compliant with medications  Fall Risk: Low   Comments: Ambulates with a steady gait with no assist device    EKG Interpretation: NSR      EXERCISE ASSESSMENT and PLAN    Current Exercise Program in Rehab:       Frequency: 3 days/week Supplement with home exercise 2+ days/wk as tolerated       Minutes: 40        METS: 3 3-4 3          HR:    RPE: 4-6         Modalities: UBE, Lifecycle, NuStep and Recumbent bike      Strength trainin-3 days / week  12-15 repetitions  1-2 sets per modality   Will be added following at least 8 weeks post surgery and 8-10 monitored sessions   Modalities: Arm Curl, Upright Rows, Front Raises and Shoulder Shrugs    Home Exercise: Type: outdoor walking 15 daily    Goals: Exercise 5 days/wk, >150mins/wk of moderate intensity exercise, Resume ADLs with increased strength, Return to work unrestricted, Attend Rehab regularly and start a home exercise program    Progression Toward Goals:  Pt is progressing and showing improvement  toward the following goals:  attending CR regularly, exercise 7days/wk, increasing strength   , Will continue to educate and progress as tolerated      Education: benefit of exercise for CAD risk factors, AHA guidelines to achieve >150 mins/wk of moderate exercise, RPE scale and class: Risk Factors for Heart Disease   Plan:home exercise 30+ mins 2 days opposite CR  Readiness to change: Action:  (Changing behavior)      NUTRITION ASSESSMENT AND PLAN    Weight control:    Starting weight: 166 5   Current weight:   174  Waist circumference:    Startin 5   Current:      Diabetes: T2D, diet controlled, Patient reported fasting , on Insulin   A1c: n/a    last measured: n/a    Lipid management: Discussed diet and lipid management and Last lipid profile 2022  Chol 134  TRG 51  HDL 49  LDL 75    Goals:fasting BG , improved A1c  < 7 0%, Eat 4-5 cups of fruits and vegetables daily, choose healthy snacks: light popcorn, plain pretzels, seldom eat or choose low fat ice-cream, fruit juice bars or frozen yogurt  and eliminate or choose low-fat sweets    Progression Toward Goals: Pt is progressing and showing improvement  toward the following goals:  more frutis and veggies  , Will continue to educate and progress as tolerated , Goals met: improved A1C  Education: heart healthy eating  low sodium diet  nutrition for  lipid management  nutrition for Improved BG control  education class: Heart Healthy Eating  education class:  Label Reading  Plan: eat fewer desserts and sweets and monitor home blood glucose  Readiness to change: Action:  (Changing behavior)      PSYCHOSOCIAL ASSESSMENT AND PLAN    Emotional:  Depression assessment:  PHQ-9 = 1-4 = Minimal Depression            Anxiety measure:  MELI-7 = 0-4  = Not anxious  Self-reported stress level:  6  Social support: Patient reports excellent emotional/social support from family    Goals:  Reduce perceived stress to 1-3/10, improved [facility] QOL < 27, PHQ-9 - reduced severity by one level, Social Support in [place] Score < 3 and increased energy    Progression Toward Goals: Pt is progressing and showing improvement  toward the following goals:  PHq9 reduced, increasing energy  , Will continue to educate and progress as tolerated      Education: signs/sxs of depression, benefits of a positive support system, stress management techniques, class:  Stress and Your Health  and class:  Relaxation  Plan: Refer to [employer], Exercise, Enjoy a hobby and Repeat PHQ-9 every 30 days if score >5  Readiness to change: Action:  (Changing behavior)      OTHER CORE COMPONENTS     Tobacco:   Social History     Tobacco Use   Smoking Status Former Smoker    Quit date:     Years since quittin 6   Smokeless Tobacco Never Used       Tobacco Use Intervention:   Pt quit in    and has abstained    Anginal Symptoms:  None   NTG use: No prescription    Blood pressure:    Restin//68   Exercise: 118//74  Goals: consistent BP < 130/80, moderate intensity exercise >150 mins/wk and medication compliance    Progression Toward Goals: Pt is progressing and showing improvement  toward the following goals:  consistent BP at rest, 150 m/wk, med complaince   , Will continue to educate and progress as tolerated      Education:  understanding high blood pressure and it's relationship to CAD, low sodium diet and HTN and Education class:  Common Heart Medications  Plan: Class: Understanding Heart Disease, engage in regular exercise, monitor home BP and continue with salt free diet  Readiness to change: Action:  (Changing behavior)

## 2022-08-09 ENCOUNTER — HOSPITAL ENCOUNTER (OUTPATIENT)
Dept: NON INVASIVE DIAGNOSTICS | Facility: HOSPITAL | Age: 57
Discharge: HOME | End: 2022-08-09
Attending: INTERNAL MEDICINE
Payer: COMMERCIAL

## 2022-08-09 VITALS
HEIGHT: 67 IN | HEART RATE: 63 BPM | SYSTOLIC BLOOD PRESSURE: 124 MMHG | DIASTOLIC BLOOD PRESSURE: 72 MMHG | WEIGHT: 188 LBS | BODY MASS INDEX: 29.51 KG/M2

## 2022-08-09 DIAGNOSIS — L97.509 TYPE 2 DIABETES MELLITUS WITH FOOT ULCER, UNSPECIFIED WHETHER LONG TERM INSULIN USE (HCC): ICD-10-CM

## 2022-08-09 DIAGNOSIS — E78.5 DYSLIPIDEMIA: ICD-10-CM

## 2022-08-09 DIAGNOSIS — Z89.512 S/P UNILATERAL BKA (BELOW KNEE AMPUTATION), LEFT (HCC): ICD-10-CM

## 2022-08-09 DIAGNOSIS — I73.9 PVD (PERIPHERAL VASCULAR DISEASE) (HCC): ICD-10-CM

## 2022-08-09 DIAGNOSIS — I11.0 HYPERTENSIVE HEART DISEASE WITH CHRONIC COMBINED SYSTOLIC AND DIASTOLIC CONGESTIVE HEART FAILURE (HCC): ICD-10-CM

## 2022-08-09 DIAGNOSIS — I25.5 ISCHEMIC CONGESTIVE CARDIOMYOPATHY (HCC): ICD-10-CM

## 2022-08-09 DIAGNOSIS — I48.0 PAROXYSMAL ATRIAL FIBRILLATION (HCC): ICD-10-CM

## 2022-08-09 DIAGNOSIS — I42.0 ISCHEMIC CONGESTIVE CARDIOMYOPATHY (HCC): ICD-10-CM

## 2022-08-09 DIAGNOSIS — Z95.1 S/P CABG X 3: ICD-10-CM

## 2022-08-09 DIAGNOSIS — Z95.2 S/P AVR (AORTIC VALVE REPLACEMENT): ICD-10-CM

## 2022-08-09 DIAGNOSIS — E11.621 TYPE 2 DIABETES MELLITUS WITH FOOT ULCER, UNSPECIFIED WHETHER LONG TERM INSULIN USE (HCC): ICD-10-CM

## 2022-08-09 DIAGNOSIS — I50.42 HYPERTENSIVE HEART DISEASE WITH CHRONIC COMBINED SYSTOLIC AND DIASTOLIC CONGESTIVE HEART FAILURE (HCC): ICD-10-CM

## 2022-08-09 PROCEDURE — 93306 TTE W/DOPPLER COMPLETE: CPT

## 2022-08-11 ENCOUNTER — TELEPHONE (OUTPATIENT)
Dept: FAMILY MEDICINE CLINIC | Facility: CLINIC | Age: 57
End: 2022-08-11

## 2022-08-11 LAB
AORTIC ROOT: 2.5 CM
AORTIC VALVE MEAN VELOCITY: 12 M/S
APICAL FOUR CHAMBER EJECTION FRACTION: 36 %
AV AREA BY CONTINUOUS VTI: 2.1 CM2
AV AREA PEAK VELOCITY: 1.9 CM2
AV LVOT MEAN GRADIENT: 3 MMHG
AV LVOT PEAK GRADIENT: 5 MMHG
AV MEAN GRADIENT: 8 MMHG
AV PEAK GRADIENT: 17 MMHG
AV VELOCITY RATIO: 0.55
DOP CALC AO PEAK VEL: 2 M/S
DOP CALC AO VTI: 35.06 CM
DOP CALC LVOT DIAMETER: 2 CM
DOP CALC LVOT PEAK VEL VTI: 22.9 CM
DOP CALC LVOT PEAK VEL: 1.09 M/S
DOP CALC LVOT STROKE INDEX: 36 ML/M2
DOP CALC LVOT STROKE VOLUME: 71
E/A RATIO: 54
FRACTIONAL SHORTENING: 18 (ref 28–44)
INTERVENTRICULAR SEPTUM IN DIASTOLE (PARASTERNAL SHORT AXIS VIEW): 1.2 CM
INTERVENTRICULAR SEPTUM: 1.2 CM (ref 0.6–1.1)
LAAS-AP2: 23.7 CM2
LAAS-AP4: 17.1 CM2
LEFT ATRIUM AREA SYSTOLE SINGLE PLANE A4C: 17.1 CM2
LEFT ATRIUM SIZE: 3.4 CM
LEFT ATRIUM VOLUME INDEX (MOD BIPLANE): 17.3
LEFT INTERNAL DIMENSION IN SYSTOLE: 4.7 CM (ref 2.1–4)
LEFT VENTRICULAR INTERNAL DIMENSION IN DIASTOLE: 5.7 CM (ref 3.5–6)
LEFT VENTRICULAR POSTERIOR WALL IN END DIASTOLE: 1 CM
LEFT VENTRICULAR STROKE VOLUME: 57 ML
LVSV (TEICH): 57 ML
MV E'TISSUE VEL-LAT: 10 CM/S
MV E'TISSUE VEL-SEP: 5 CM/S
MV PEAK A VEL: 0.01 M/S
MV PEAK E VEL: 54 CM/S
RIGHT ATRIUM AREA SYSTOLE A4C: 12.6 CM2
RIGHT VENTRICLE ID DIMENSION: 3.5 CM
SL CV LEFT ATRIUM LENGTH A2C: 5.6 CM
SL CV LV EF: 35
SL CV PED ECHO LEFT VENTRICLE DIASTOLIC VOLUME (MOD BIPLANE) 2D: 160 ML
SL CV PED ECHO LEFT VENTRICLE SYSTOLIC VOLUME (MOD BIPLANE) 2D: 103 ML

## 2022-08-11 PROCEDURE — 93306 TTE W/DOPPLER COMPLETE: CPT | Performed by: INTERNAL MEDICINE

## 2022-08-11 NOTE — TELEPHONE ENCOUNTER
DR LIRA [facility]     Patient needs a script sent to [facility] to have prosthetic work done  Please advise 
Patient says he is trying to get pressure off the stump  He needs 2 knee sleeves, an insert, and some socks  The order should be sent to [facility]  Phone number [phone]  I called [facility], please fax order to [phone]  -" adjust , replace or repair prosthetic" / include dx  -"Prosthetic Supplies" /  include dx       Slava Gardner MA
Please advise    Pierre Pugh LPN
Please call patient for the specifics of what he needs for the prosthetic    Does he need fitting/pads, etc?
Rx is in clerical inbasket, please fax 
Script faxed and confirmation received   NFA
Yes

## 2022-08-12 ENCOUNTER — TELEPHONE (OUTPATIENT)
Dept: CARDIOLOGY CLINIC | Facility: CLINIC | Age: 57
End: 2022-08-12

## 2022-08-12 NOTE — TELEPHONE ENCOUNTER
----- Message from Jenny Skinner MD sent at 8/11/2022 12:12 PM EDT -----  Patient's echo shows no significant change EF remains 35 40%  Mechanical aortic valve now seems to be functioning adequately

## 2022-10-18 ENCOUNTER — ANTICOAG VISIT (OUTPATIENT)
Dept: CARDIOLOGY CLINIC | Facility: CLINIC | Age: 57
End: 2022-10-18

## 2022-10-18 ENCOUNTER — OFFICE VISIT (OUTPATIENT)
Dept: CARDIOLOGY CLINIC | Facility: CLINIC | Age: 57
End: 2022-10-18
Payer: COMMERCIAL

## 2022-10-18 VITALS
BODY MASS INDEX: 29.19 KG/M2 | WEIGHT: 186 LBS | SYSTOLIC BLOOD PRESSURE: 130 MMHG | TEMPERATURE: 97 F | OXYGEN SATURATION: 97 % | HEIGHT: 67 IN | DIASTOLIC BLOOD PRESSURE: 70 MMHG | HEART RATE: 69 BPM

## 2022-10-18 DIAGNOSIS — I50.22 CHRONIC SYSTOLIC CONGESTIVE HEART FAILURE (HCC): Primary | ICD-10-CM

## 2022-10-18 DIAGNOSIS — L97.509 TYPE 2 DIABETES MELLITUS WITH FOOT ULCER, UNSPECIFIED WHETHER LONG TERM INSULIN USE (HCC): ICD-10-CM

## 2022-10-18 DIAGNOSIS — Z89.512 S/P UNILATERAL BKA (BELOW KNEE AMPUTATION), LEFT (HCC): ICD-10-CM

## 2022-10-18 DIAGNOSIS — Z95.1 S/P CABG X 3: ICD-10-CM

## 2022-10-18 DIAGNOSIS — I11.0 HYPERTENSIVE HEART DISEASE WITH CHRONIC COMBINED SYSTOLIC AND DIASTOLIC CONGESTIVE HEART FAILURE (HCC): ICD-10-CM

## 2022-10-18 DIAGNOSIS — Z95.2 S/P AVR (AORTIC VALVE REPLACEMENT): ICD-10-CM

## 2022-10-18 DIAGNOSIS — I50.42 HYPERTENSIVE HEART DISEASE WITH CHRONIC COMBINED SYSTOLIC AND DIASTOLIC CONGESTIVE HEART FAILURE (HCC): ICD-10-CM

## 2022-10-18 DIAGNOSIS — I25.5 ISCHEMIC CONGESTIVE CARDIOMYOPATHY (HCC): ICD-10-CM

## 2022-10-18 DIAGNOSIS — I48.0 PAROXYSMAL ATRIAL FIBRILLATION (HCC): ICD-10-CM

## 2022-10-18 DIAGNOSIS — E78.5 DYSLIPIDEMIA: ICD-10-CM

## 2022-10-18 DIAGNOSIS — I73.9 PVD (PERIPHERAL VASCULAR DISEASE) (HCC): ICD-10-CM

## 2022-10-18 DIAGNOSIS — I42.0 ISCHEMIC CONGESTIVE CARDIOMYOPATHY (HCC): ICD-10-CM

## 2022-10-18 DIAGNOSIS — E11.621 TYPE 2 DIABETES MELLITUS WITH FOOT ULCER, UNSPECIFIED WHETHER LONG TERM INSULIN USE (HCC): ICD-10-CM

## 2022-10-18 PROCEDURE — 93000 ELECTROCARDIOGRAM COMPLETE: CPT | Performed by: INTERNAL MEDICINE

## 2022-10-18 PROCEDURE — 99214 OFFICE O/P EST MOD 30 MIN: CPT | Performed by: INTERNAL MEDICINE

## 2022-10-18 NOTE — PROGRESS NOTES
Progress Note - Cardiology Office  [facility]    Mohan Swift 59 y o  male MRN: [id number]  : 1965  Encounter: [id number]      Assessment:     1  Hypertensive heart disease with chronic combined systolic and diastolic congestive heart failure ([facility] )    2  S/P AVR (aortic valve replacement) with bioprosthetic valve 23 mm at [facility]    3  Paroxysmal atrial fibrillation (HCC)    4  Dyslipidemia    5  S/P CABG x 3    6  Type 2 diabetes mellitus with foot ulcer, unspecified whether long term insulin use ([facility] )    7  PVD (peripheral vascular disease) ([facility] )    8  S/P unilateral BKA (below knee amputation), left [facility])        Discussion Summary and Plan:      1  Hypertensive heart disease with chronic combined systolic and diastolic heart failure  Patient's EF is 35-40%  Currently he is on Coreg, valsartan and Lasix  Along with Aldactone there is no evidence of any volume overload  We will check electrolytes  2  Ischemic cardiomyopathy  EF 35 40% LV was dilated mild-to-moderate MR this echo was done before the surgery  Repeat echo Doppler done in 2020 shows valve is functioning adequately  His EF is stable around 35-40%  3  Postoperative atrial fibrillation  Patient had postoperative atrial fibrillation he was on amiodarone  Will decrease the dose to 100 mg check Holter monitor  He was recommended to be enrolled in our clinic which I believe did have separate yet  He does not remember how much warfarin he is taking  Will check stat PT INR and he should be enrolled in our clinic today  4  Dyslipidemia  Continue statins check fasting lipids  Repeat labs will be ordered      5  Coronary artery disease status post CABG  Cardiac catheterization in 2022 shows left main stenosis, ostial circumflex and ostial RCA underwent successful  Three-vessel bypass with LIMA to LAD vein graft to RCA and and circumflex  No symptoms of angina       6   Type 2 diabetes mellitus  Management as per medical team     7  PVD with history of left BKA  Patient is doing cardiac rehab  Patient has been doing cardiac rehab      Plan  Decrease amiodarone to 50 mg     enrolled in Coumadin Clinic and target INR is 2-3 today and stat INR today  check electrolytes  He did not try any Jardiance he is on Lasix  Continue Lasix 20 mg daily    Advised patient to be compliant with warfarin otherwise valve can clot lead to a adverse outcome  Patient was advised and educated to call our office  immediately if  patient has any new symptoms of chest pain/shortness of breath, near-syncope, syncope, light headedness sustained palpitations  or any other cardiovascular symptoms before their scheduled follow-up appointment  Office #[phone]  Please call [phone] if any questions  Counseling :  A description of the counseling  Goals and Barriers  Patient's ability to self care: Yes  Medication side effect reviewed with patient in detail and all their questions answered to their satisfaction  HPI :     Javad Menendez is a 59y o  year old male who came for follow up  Patient was admitted to [facility] in February 23 2022 with pulmonary edema and bilateral pleural effusion  He has medical history significant for hypertensive heart disease, diabetes mellitus, dyslipidemia, cardiac murmur, peripheral vascular disease with left BKA  He also required BiPAP support in the emergency room  CT of the chest was done which shows he did not reveal any pulmonary embolism but noted to have pulmonary edema  Echo shows EF 35 40%  Patient was recommended further cardiac catheterization and was transferred to [facility] for further cardiac workup  Echo Doppler February 2022 shows patient has EF 35 40%, mild LVH, mild-to-moderate aortic stenosis mild TR and mild-to-moderate MR      After his transferred to [facility]   Cardiac catheterization shows distal left main 50%, 50-60% proximal long left knee descending artery, 95% ostial circumflex and at least moderate ostial RCA stenosis with mid RCA around 70% stenosis  Moderate aortic stenosis by echo  Patient underwent surgical AVR and CABG on March 9, 2022 with LIMA to LAD, vein graft to RCA and circ as well as replacement of aortic valve with 23 mm valve  Postoperatively he has episode of atrial fibrillation he was on pace around for some time  As well  He has been doing cardiac rehab  He has no issues doing it  He does have history of left BKA due to vascular disease  He does not smoke  His surgery was on 25th 2022 at [facility]     10/18/2022  Above reviewed  Patient came for follow-up  He has medical history significant for hypertensive heart disease, ischemic cardiomyopathy with EF 35 40%, diabetes mellitus, dyslipidemia, cardiac murmur with history of aortic stenosis status post mechanical aortic valve done at [facility] in March 2022 who came for follow-up  His repeat echo Doppler shows his EF is 3035-40%  He is on warfarin  Not sure who was following it  He would like to follow in our office  He underwent AVR and CABG on March 9, 2022 with LIMA to LAD vein graft to RCA and circumflex and replacement of his aortic valve with mechanical valve  Postoperatively he had atrial fibrillation but now he is maintaining sinus rhythm he is on amiodarone 100 mg daily  He does have history of left BKA due to vascular disease  His last blood test   His last blood test was from June 2022 with us  No other blood test report is available  Result of his Holter monitor is not available yet  Review of Systems   Constitutional: Negative for activity change, chills, diaphoresis, fever and unexpected weight change  HENT: Negative for congestion  Eyes: Negative for discharge and redness  Respiratory: Negative for cough, chest tightness, shortness of breath and wheezing      Cardiovascular: Negative  Negative for chest pain, palpitations and leg swelling  Gastrointestinal: Negative for abdominal pain, diarrhea and nausea  Endocrine: Negative  Genitourinary: Negative for decreased urine volume and urgency  Musculoskeletal: Negative  Negative for arthralgias, back pain and gait problem  Skin: Negative for rash and wound  Allergic/Immunologic: Negative  Neurological: Negative for dizziness, seizures, syncope, weakness, light-headedness and headaches  Hematological: Negative  Psychiatric/Behavioral: Negative for agitation and confusion  The patient is not nervous/anxious          Historical Information   Past Medical History:   Diagnosis Date   • Diabetes mellitus ([facility] [address] )    • Hyperlipidemia    • Hypertension      Past Surgical History:   Procedure Laterality Date   • BELOW KNEE LEG AMPUTATION     • IR THORACENTESIS  2022     Social History     Substance and Sexual Activity   Alcohol Use Not Currently     Social History     Substance and Sexual Activity   Drug Use Never     Social History     Tobacco Use   Smoking Status Former Smoker   • Quit date:    • Years since quittin 8   Smokeless Tobacco Never Used     Family History:   Family History   Problem Relation Age of Onset   • No Known Problems Mother        Meds/Allergies     Allergies   Allergen Reactions   • Bee Venom Anaphylaxis     Reaction Date: 2005;    • Penicillins Rash     Reaction Date: 2005;        Current Outpatient Medications:   •  amiodarone 200 mg tablet, Take 0 5 tablets (100 mg total) by mouth daily, Disp: 45 tablet, Rfl: 1  •  aspirin (ECOTRIN LOW STRENGTH) 81 mg EC tablet, Take 1 tablet (81 mg total) by mouth daily, Disp: , Rfl: 0  •  B-D UF III MINI PEN NEEDLES 31G X 5 MM MISC, USE AS DIRECTED, Disp: 50 each, Rfl: 0  •  Basaglar KwikPen 100 units/mL injection pen, INJECT 68 UNITS UNDER THE SKIN DAILY AT BEDTIME, Disp: 15 mL, Rfl: 5  •  CHUYITA MICROLET LANCETS lancets, by Does not apply route, Disp: , Rfl:   •  carvedilol (COREG) 3 125 mg tablet, TAKE 1 TABLET (3 125 MG TOTAL) BY MOUTH 2 TIMES A DAY WITH MEALS, Disp: , Rfl:   •  furosemide (LASIX) 20 mg tablet, TAKE 1 TABLET BY MOUTH EVERY DAY, Disp: 90 tablet, Rfl: 3  •  Insulin Pen Needle 31G X 5 MM MISC, Inject under the skin 4 (four) times a day, Disp: 360 each, Rfl: 3  •  rosuvastatin (CRESTOR) 20 MG tablet, Take 20 mg by mouth daily, Disp: , Rfl:   •  spironolactone (ALDACTONE) 25 mg tablet, Take 25 mg by mouth daily, Disp: , Rfl:   •  valsartan (DIOVAN) 40 mg tablet, Take 40 mg by mouth daily, Disp: , Rfl:   •  warfarin (COUMADIN) 2 5 mg tablet, Take 3 mg by mouth daily, Disp: , Rfl:   •  Jardiance 10 MG TABS tablet, TAKE 1 TABLET (10 MG TOTAL) BY MOUTH EVERY MORNING  (Patient not taking: Reported on 10/18/2022), Disp: 90 tablet, Rfl: 3    Vitals: Blood pressure 130/70, pulse 69, temperature (!) 97 °F (36 1 °C), height 5' 7" (1 702 m), weight 84 4 kg (186 lb), SpO2 97 %  ?  Body mass index is 29 13 kg/m²  Wt Readings from Last 3 Encounters:   10/18/22 84 4 kg (186 lb)   08/09/22 85 3 kg (188 lb)   08/01/22 85 4 kg (188 lb 3 2 oz)     Vitals:    10/18/22 1323   Weight: 84 4 kg (186 lb)     BP Readings from Last 3 Encounters:   10/18/22 130/70   08/09/22 124/72   08/01/22 124/72       Physical Exam:    Physical Exam    Neurologic:  Alert & oriented x 3, no new focal deficits, Not in any acute distress,  Constitutional:    Adequate built,  non-toxic appearance   Neck: No tenderness,  Neck supple, No JVP,   Respiratory:  Bilateral air entry, mostly clear to auscultation  Cardiovascular: S1-S2 regular with both opening and closing click of aortic valve heard  GI:  Soft, nondistended, no hepatosplenomegaly appreciated  Musculoskeletal:  No edema, no tenderness, no deformities     Skin:  Well hydrated, no rash   Extremities:  No edema and distal pulses are present  Psychiatric:  Speech and behavior appropriate     Diagnostic Studies Review Cardio:  Limited echo in [facility] in March 2022:  Normal left ventricular wall thickness  Left ventricular cavity size is moderate dilated  There is moderate global hypokinesis of the left ventricle  The calculated left ventricular ejection fraction is 35-40 %  Echo Doppler in February 2022 shows normal LV thickness, LV size mildly dilated, EF 17%, grade 2 diastolic dysfunction, mild-to-moderate TR, moderate aortic stenosis  Repeat echo Doppler  Repeat echo Doppler done on 08/09/2022 shows EF 35-40%, moderate global hypokinesis, grade 1 diastolic dysfunction, a 23 mm bileaflet mechanical valve with normally functioning valve peak gradient 17 mean gradient 8 mmHg and mild MR  Diagnostic cardiac catheterization done at [facility] on 02/25/2022 shows multivessel coronary artery disease involving distal left main bifurcation, peak to peak gradient across aortic valve was 22 mm of mercury  Transradial cardiac catheterization performed at this morning shows a smooth tapering 50% distal left main stenosis leading into a 50-60% proximal long left anterior descending stenosis and a 95% ostial circumflex stenosis  Adriánlo Blas is also at least moderate to severe ostial stenosis of a moderate caliber RCA and a long diffuse 70% lesion in the mid RCA      Moderate AS was detected on ECHO in the setting of depressed EF and he went for surgical AVR/CABG with Elan on March 9th, 2022  Surgery: CABG x3 W/ LIMA, RSVG (ENDOSCOPIC HARVEST), REPLACEMENT  AORTIC VALVE W/ 23MM ON-X AORTIC VALVE  EKG:  Twelve lead EKG done on 06/09/2022 normal sinus rhythm heart rate 67 beats per minute LVH by voltage nonspecific ST changes  Twelve lead EKG 10/18/2022 shows sinus rhythm heart rate 69 beats per minute nonspecific ST changes  Particularly in lead 1 and aVL with T-wave inversion not changed from previous EKG        Lab Results   Component Value Date    WBC 6 0 06/22/2022    HGB 12 6 (L) 06/22/2022 HCT 37 8 06/22/2022    MCV 89 06/22/2022    RDW 14 3 06/22/2022     06/22/2022     BMP:  Lab Results   Component Value Date    SODIUM 143 06/22/2022    K 5 2 06/22/2022     06/22/2022    CO2 23 06/22/2022    BUN 33 (H) 06/22/2022    CREATININE 1 21 06/22/2022    GLUC 101 (H) 06/22/2022    CALCIUM 8 1 (L) 02/23/2022    CORRECTEDCA 9 1 02/23/2022    EGFR 69 06/22/2022     Troponins:    LFT:  Lab Results   Component Value Date    AST 21 06/22/2022    ALT 21 06/22/2022    ALKPHOS 99 02/23/2022    TP 7 0 06/22/2022    ALB 4 3 06/22/2022        Lab Results   Component Value Date    HGBA1C 6 5 08/01/2022     Lipid Profile:   Lab Results   Component Value Date    CHOLESTEROL 171 06/22/2022    HDL 58 06/22/2022    LDLCALC 90 06/22/2022    TRIG 129 06/22/2022     Lab Results   Component Value Date    CHOLESTEROL 171 06/22/2022    CHOLESTEROL 134 02/23/2022     No results found for: CKTOTAL, CKMB, CKMBINDEX, TROPONINI  Lab Results   Component Value Date    NTBNP 3,146 (H) 02/23/2022              Dr Wyatt Weiner MD [facility]      "This note has been constructed using a voice recognition system  Therefore there may be syntax, spelling, and/or grammatical errors   Please call if you have any questions  "

## 2022-10-18 NOTE — PATIENT INSTRUCTIONS
Decrease amiodarone to 50 mg     enrolled in Coumadin Clinic and target INR is 2-3 today and stat INR today  check electrolytes    He did not try any Jardiance he is on Lasix  Continue Lasix 20 mg daily    Advised patient to be compliant with warfarin otherwise valve can clot lead to a adverse outcome

## 2022-10-20 DIAGNOSIS — I48.0 PAROXYSMAL ATRIAL FIBRILLATION (HCC): Primary | ICD-10-CM

## 2022-10-20 LAB — INR PPP: 1.4 (ref 0.84–1.19)

## 2022-10-25 LAB
ALBUMIN SERPL-MCNC: 4.3 G/DL (ref 3.8–4.9)
ALBUMIN SERPL-MCNC: 4.3 G/DL (ref 3.8–4.9)
ALBUMIN/GLOB SERPL: 1.7 {RATIO} (ref 1.2–2.2)
ALBUMIN/GLOB SERPL: 1.7 {RATIO} (ref 1.2–2.2)
ALP SERPL-CCNC: 93 IU/L (ref 44–121)
ALP SERPL-CCNC: 95 IU/L (ref 44–121)
ALT SERPL-CCNC: 25 IU/L (ref 0–44)
ALT SERPL-CCNC: 26 IU/L (ref 0–44)
AST SERPL-CCNC: 27 IU/L (ref 0–40)
AST SERPL-CCNC: 29 IU/L (ref 0–40)
BASOPHILS # BLD AUTO: 0.1 X10E3/UL (ref 0–0.2)
BASOPHILS # BLD AUTO: 0.1 X10E3/UL (ref 0–0.2)
BASOPHILS NFR BLD AUTO: 1 %
BASOPHILS NFR BLD AUTO: 1 %
BILIRUB SERPL-MCNC: 0.2 MG/DL (ref 0–1.2)
BILIRUB SERPL-MCNC: 0.3 MG/DL (ref 0–1.2)
BUN SERPL-MCNC: 34 MG/DL (ref 6–24)
BUN SERPL-MCNC: 37 MG/DL (ref 6–24)
BUN/CREAT SERPL: 21 (ref 9–20)
BUN/CREAT SERPL: 21 (ref 9–20)
CALCIUM SERPL-MCNC: 8.8 MG/DL (ref 8.7–10.2)
CALCIUM SERPL-MCNC: 8.9 MG/DL (ref 8.7–10.2)
CHLORIDE SERPL-SCNC: 100 MMOL/L (ref 96–106)
CHLORIDE SERPL-SCNC: 102 MMOL/L (ref 96–106)
CHOLEST SERPL-MCNC: 191 MG/DL (ref 100–199)
CHOLEST SERPL-MCNC: 201 MG/DL (ref 100–199)
CHOLEST/HDLC SERPL: 3.9 RATIO (ref 0–5)
CO2 SERPL-SCNC: 24 MMOL/L (ref 20–29)
CO2 SERPL-SCNC: 24 MMOL/L (ref 20–29)
CREAT SERPL-MCNC: 1.63 MG/DL (ref 0.76–1.27)
CREAT SERPL-MCNC: 1.73 MG/DL (ref 0.76–1.27)
EGFR: 45 ML/MIN/1.73
EGFR: 48 ML/MIN/1.73
EOSINOPHIL # BLD AUTO: 0.4 X10E3/UL (ref 0–0.4)
EOSINOPHIL # BLD AUTO: 0.4 X10E3/UL (ref 0–0.4)
EOSINOPHIL NFR BLD AUTO: 4 %
EOSINOPHIL NFR BLD AUTO: 5 %
ERYTHROCYTE [DISTWIDTH] IN BLOOD BY AUTOMATED COUNT: 12.6 % (ref 11.6–15.4)
ERYTHROCYTE [DISTWIDTH] IN BLOOD BY AUTOMATED COUNT: 12.8 % (ref 11.6–15.4)
EST. AVERAGE GLUCOSE BLD GHB EST-MCNC: 171 MG/DL
GLOBULIN SER-MCNC: 2.5 G/DL (ref 1.5–4.5)
GLOBULIN SER-MCNC: 2.5 G/DL (ref 1.5–4.5)
GLUCOSE SERPL-MCNC: 61 MG/DL (ref 70–99)
GLUCOSE SERPL-MCNC: 62 MG/DL (ref 70–99)
HBA1C MFR BLD: 7.6 % (ref 4.8–5.6)
HCT VFR BLD AUTO: 36.2 % (ref 37.5–51)
HCT VFR BLD AUTO: 37.1 % (ref 37.5–51)
HDLC SERPL-MCNC: 49 MG/DL
HDLC SERPL-MCNC: 51 MG/DL
HGB BLD-MCNC: 12.4 G/DL (ref 13–17.7)
HGB BLD-MCNC: 12.5 G/DL (ref 13–17.7)
IMM GRANULOCYTES # BLD: 0 X10E3/UL (ref 0–0.1)
IMM GRANULOCYTES # BLD: 0.1 X10E3/UL (ref 0–0.1)
IMM GRANULOCYTES NFR BLD: 0 %
IMM GRANULOCYTES NFR BLD: 1 %
IRON SERPL-MCNC: 102 UG/DL (ref 38–169)
LDLC SERPL CALC-MCNC: 105 MG/DL (ref 0–99)
LDLC SERPL CALC-MCNC: 112 MG/DL (ref 0–99)
LYMPHOCYTES # BLD AUTO: 1.3 X10E3/UL (ref 0.7–3.1)
LYMPHOCYTES # BLD AUTO: 1.4 X10E3/UL (ref 0.7–3.1)
LYMPHOCYTES NFR BLD AUTO: 17 %
LYMPHOCYTES NFR BLD AUTO: 18 %
MCH RBC QN AUTO: 30.2 PG (ref 26.6–33)
MCH RBC QN AUTO: 31.2 PG (ref 26.6–33)
MCHC RBC AUTO-ENTMCNC: 33.7 G/DL (ref 31.5–35.7)
MCHC RBC AUTO-ENTMCNC: 34.3 G/DL (ref 31.5–35.7)
MCV RBC AUTO: 90 FL (ref 79–97)
MCV RBC AUTO: 91 FL (ref 79–97)
MICRODELETION SYND BLD/T FISH: NORMAL
MICRODELETION SYND BLD/T FISH: NORMAL
MONOCYTES # BLD AUTO: 0.6 X10E3/UL (ref 0.1–0.9)
MONOCYTES # BLD AUTO: 0.7 X10E3/UL (ref 0.1–0.9)
MONOCYTES NFR BLD AUTO: 8 %
MONOCYTES NFR BLD AUTO: 9 %
NEUTROPHILS # BLD AUTO: 5.4 X10E3/UL (ref 1.4–7)
NEUTROPHILS # BLD AUTO: 5.6 X10E3/UL (ref 1.4–7)
NEUTROPHILS NFR BLD AUTO: 67 %
NEUTROPHILS NFR BLD AUTO: 69 %
PLATELET # BLD AUTO: 204 X10E3/UL (ref 150–450)
PLATELET # BLD AUTO: 210 X10E3/UL (ref 150–450)
POTASSIUM SERPL-SCNC: 3.7 MMOL/L (ref 3.5–5.2)
POTASSIUM SERPL-SCNC: 3.8 MMOL/L (ref 3.5–5.2)
PROT SERPL-MCNC: 6.8 G/DL (ref 6–8.5)
PROT SERPL-MCNC: 6.8 G/DL (ref 6–8.5)
RBC # BLD AUTO: 3.97 X10E6/UL (ref 4.14–5.8)
RBC # BLD AUTO: 4.14 X10E6/UL (ref 4.14–5.8)
SL AMB VLDL CHOLESTEROL CALC: 37 MG/DL (ref 5–40)
SL AMB VLDL CHOLESTEROL CALC: 38 MG/DL (ref 5–40)
SODIUM SERPL-SCNC: 139 MMOL/L (ref 134–144)
SODIUM SERPL-SCNC: 140 MMOL/L (ref 134–144)
TRIGL SERPL-MCNC: 218 MG/DL (ref 0–149)
TRIGL SERPL-MCNC: 222 MG/DL (ref 0–149)
TSH SERPL DL<=0.005 MIU/L-ACNC: 1.64 UIU/ML (ref 0.45–4.5)
WBC # BLD AUTO: 7.8 X10E3/UL (ref 3.4–10.8)
WBC # BLD AUTO: 8.2 X10E3/UL (ref 3.4–10.8)

## 2022-10-28 DIAGNOSIS — Z95.2 S/P AVR (AORTIC VALVE REPLACEMENT): Primary | ICD-10-CM

## 2022-10-28 DIAGNOSIS — I42.0 ISCHEMIC CONGESTIVE CARDIOMYOPATHY (HCC): ICD-10-CM

## 2022-10-28 DIAGNOSIS — I25.5 ISCHEMIC CONGESTIVE CARDIOMYOPATHY (HCC): ICD-10-CM

## 2022-10-31 DIAGNOSIS — Z95.2 S/P AVR (AORTIC VALVE REPLACEMENT): Primary | ICD-10-CM

## 2022-10-31 RX ORDER — WARFARIN SODIUM 2.5 MG/1
2.5 TABLET ORAL
Qty: 90 TABLET | Refills: 3 | Status: SHIPPED | OUTPATIENT
Start: 2022-10-31

## 2022-11-01 ENCOUNTER — ANTICOAG VISIT (OUTPATIENT)
Dept: CARDIOLOGY CLINIC | Facility: CLINIC | Age: 57
End: 2022-11-01

## 2022-11-01 DIAGNOSIS — I50.22 CHRONIC SYSTOLIC CONGESTIVE HEART FAILURE (HCC): Primary | ICD-10-CM

## 2022-11-01 DIAGNOSIS — I42.0 ISCHEMIC CONGESTIVE CARDIOMYOPATHY (HCC): ICD-10-CM

## 2022-11-01 DIAGNOSIS — Z95.2 S/P AVR (AORTIC VALVE REPLACEMENT): ICD-10-CM

## 2022-11-01 DIAGNOSIS — I25.5 ISCHEMIC CONGESTIVE CARDIOMYOPATHY (HCC): ICD-10-CM

## 2022-11-01 LAB — INR PPP: 1.5 (ref 0.84–1.19)

## 2022-11-01 RX ORDER — WARFARIN SODIUM 5 MG/1
TABLET ORAL
Qty: 90 TABLET | Refills: 3 | Status: SHIPPED | OUTPATIENT
Start: 2022-11-01

## 2022-11-07 ENCOUNTER — TELEPHONE (OUTPATIENT)
Dept: CARDIOLOGY CLINIC | Facility: CLINIC | Age: 57
End: 2022-11-07

## 2022-11-07 NOTE — TELEPHONE ENCOUNTER
----- Message from Shana Joyce MD sent at 11/6/2022  5:08 AM EST -----  Patient blood test shows that his kidney function is worse  We need to cut back Lasix to 20 mg every other day and also Aldactone to every other day  He should take 1 day Aldactone and 1 day Lasix  We should get a repeat BMP in 2 weeks  It PT INR need to be followed

## 2022-11-14 ENCOUNTER — ANTICOAG VISIT (OUTPATIENT)
Dept: CARDIOLOGY CLINIC | Facility: CLINIC | Age: 57
End: 2022-11-14

## 2022-11-14 DIAGNOSIS — I25.5 ISCHEMIC CONGESTIVE CARDIOMYOPATHY (HCC): ICD-10-CM

## 2022-11-14 DIAGNOSIS — I50.22 CHRONIC SYSTOLIC CONGESTIVE HEART FAILURE (HCC): Primary | ICD-10-CM

## 2022-11-14 DIAGNOSIS — Z95.2 S/P AVR (AORTIC VALVE REPLACEMENT): ICD-10-CM

## 2022-11-14 DIAGNOSIS — I42.0 ISCHEMIC CONGESTIVE CARDIOMYOPATHY (HCC): ICD-10-CM

## 2022-11-28 ENCOUNTER — ANTICOAG VISIT (OUTPATIENT)
Dept: CARDIOLOGY CLINIC | Facility: CLINIC | Age: 57
End: 2022-11-28

## 2022-11-28 DIAGNOSIS — Z95.2 S/P AVR (AORTIC VALVE REPLACEMENT): ICD-10-CM

## 2022-11-28 DIAGNOSIS — I42.0 ISCHEMIC CONGESTIVE CARDIOMYOPATHY (HCC): ICD-10-CM

## 2022-11-28 DIAGNOSIS — I50.22 CHRONIC SYSTOLIC CONGESTIVE HEART FAILURE (HCC): Primary | ICD-10-CM

## 2022-11-28 DIAGNOSIS — I25.5 ISCHEMIC CONGESTIVE CARDIOMYOPATHY (HCC): ICD-10-CM

## 2022-11-28 LAB — INR PPP: 1.8 (ref 0.84–1.19)

## 2023-01-10 ENCOUNTER — HOSPITAL ENCOUNTER (OUTPATIENT)
Dept: NON INVASIVE DIAGNOSTICS | Facility: HOSPITAL | Age: 58
Discharge: HOME | End: 2023-01-10

## 2023-01-10 VITALS
BODY MASS INDEX: 29.19 KG/M2 | DIASTOLIC BLOOD PRESSURE: 74 MMHG | WEIGHT: 186 LBS | HEART RATE: 64 BPM | HEIGHT: 67 IN | SYSTOLIC BLOOD PRESSURE: 169 MMHG

## 2023-01-10 DIAGNOSIS — R01.1 HEART MURMUR: ICD-10-CM

## 2023-01-10 LAB
AORTIC ROOT: 3.3 CM
AORTIC VALVE MEAN VELOCITY: 13.2 M/S
APICAL FOUR CHAMBER EJECTION FRACTION: 41 %
AV AREA BY CONTINUOUS VTI: 1.8 CM2
AV AREA PEAK VELOCITY: 1.5 CM2
AV LVOT MEAN GRADIENT: 2 MMHG
AV LVOT PEAK GRADIENT: 4 MMHG
AV MEAN GRADIENT: 8 MMHG
AV PEAK GRADIENT: 16 MMHG
AV VALVE AREA: 1.77 CM2
AV VELOCITY RATIO: 0.52
DOP CALC AO PEAK VEL: 2.03 M/S
DOP CALC AO VTI: 37.81 CM
DOP CALC LVOT AREA: 2.83 CM2
DOP CALC LVOT DIAMETER: 1.9 CM
DOP CALC LVOT PEAK VEL VTI: 23.66 CM
DOP CALC LVOT PEAK VEL: 1.06 M/S
DOP CALC LVOT STROKE INDEX: 33.2 ML/M2
DOP CALC LVOT STROKE VOLUME: 67.05 CM3
DOP CALC MV VTI: 32.16 CM
E WAVE DECELERATION TIME: 195 MS
FRACTIONAL SHORTENING: 16 % (ref 28–44)
INTERVENTRICULAR SEPTUM IN DIASTOLE (PARASTERNAL SHORT AXIS VIEW): 1.1 CM
INTERVENTRICULAR SEPTUM: 1.1 CM (ref 0.6–1.1)
LAAS-AP2: 18.7 CM2
LAAS-AP4: 22.7 CM2
LEFT ATRIUM AREA SYSTOLE SINGLE PLANE A4C: 24.3 CM2
LEFT ATRIUM SIZE: 4.3 CM
LEFT INTERNAL DIMENSION IN SYSTOLE: 3.7 CM (ref 2.1–4)
LEFT VENTRICLE DIASTOLIC VOLUME (MOD BIPLANE): 148 ML
LEFT VENTRICLE SYSTOLIC VOLUME (MOD BIPLANE): 95 ML
LEFT VENTRICULAR INTERNAL DIMENSION IN DIASTOLE: 4.4 CM (ref 3.5–6)
LEFT VENTRICULAR POSTERIOR WALL IN END DIASTOLE: 1.1 CM
LEFT VENTRICULAR STROKE VOLUME: 31 ML
LV EF: 36 %
LVSV (TEICH): 31 ML
MV E'TISSUE VEL-LAT: 9 CM/S
MV E'TISSUE VEL-SEP: 5 CM/S
MV MEAN GRADIENT: 1 MMHG
MV PEAK A VEL: 0.82 M/S
MV PEAK E VEL: 63 CM/S
MV PEAK GRADIENT: 5 MMHG
MV STENOSIS PRESSURE HALF TIME: 56 MS
MV VALVE AREA BY CONTINUITY EQUATION: 2.08 CM2
MV VALVE AREA P 1/2 METHOD: 3.93 CM2
PV PEAK GRADIENT: 4 MMHG
RIGHT ATRIUM AREA SYSTOLE A4C: 13.1 CM2
RIGHT VENTRICLE ID DIMENSION: 3 CM
SL CV LEFT ATRIUM LENGTH A2C: 4.9 CM
SL CV LV EF: 40
SL CV PED ECHO LEFT VENTRICLE DIASTOLIC VOLUME (MOD BIPLANE) 2D: 90 ML
SL CV PED ECHO LEFT VENTRICLE SYSTOLIC VOLUME (MOD BIPLANE) 2D: 58 ML

## 2023-01-10 RX ADMIN — PERFLUTREN 0.8 ML/MIN: 6.52 INJECTION, SUSPENSION INTRAVENOUS at 09:31

## 2023-01-14 DIAGNOSIS — I48.0 PAROXYSMAL ATRIAL FIBRILLATION (HCC): ICD-10-CM

## 2023-01-17 RX ORDER — AMIODARONE HYDROCHLORIDE 200 MG/1
TABLET ORAL
Qty: 45 TABLET | Refills: 1 | Status: SHIPPED | OUTPATIENT
Start: 2023-01-17

## 2023-01-19 ENCOUNTER — OFFICE VISIT (OUTPATIENT)
Dept: FAMILY MEDICINE CLINIC | Facility: CLINIC | Age: 58
End: 2023-01-19

## 2023-01-19 VITALS
HEART RATE: 68 BPM | SYSTOLIC BLOOD PRESSURE: 120 MMHG | BODY MASS INDEX: 30.29 KG/M2 | TEMPERATURE: 97.8 F | OXYGEN SATURATION: 97 % | WEIGHT: 193 LBS | RESPIRATION RATE: 18 BRPM | HEIGHT: 67 IN | DIASTOLIC BLOOD PRESSURE: 70 MMHG

## 2023-01-19 DIAGNOSIS — I10 BENIGN ESSENTIAL HYPERTENSION: Chronic | ICD-10-CM

## 2023-01-19 DIAGNOSIS — E78.2 MIXED HYPERLIPIDEMIA: Chronic | ICD-10-CM

## 2023-01-19 DIAGNOSIS — I11.0 HYPERTENSIVE HEART DISEASE WITH CHRONIC COMBINED SYSTOLIC AND DIASTOLIC CONGESTIVE HEART FAILURE (HCC): ICD-10-CM

## 2023-01-19 DIAGNOSIS — I48.0 PAROXYSMAL ATRIAL FIBRILLATION (HCC): ICD-10-CM

## 2023-01-19 DIAGNOSIS — E11.51 DIABETES MELLITUS WITH PERIPHERAL CIRCULATORY DISORDER, CONTROLLED (HCC): ICD-10-CM

## 2023-01-19 DIAGNOSIS — I50.42 HYPERTENSIVE HEART DISEASE WITH CHRONIC COMBINED SYSTOLIC AND DIASTOLIC CONGESTIVE HEART FAILURE (HCC): ICD-10-CM

## 2023-01-19 DIAGNOSIS — Z23 NEED FOR VACCINATION: ICD-10-CM

## 2023-01-19 DIAGNOSIS — E11.9 TYPE 2 DIABETES MELLITUS WITHOUT COMPLICATION, WITH LONG-TERM CURRENT USE OF INSULIN (HCC): Primary | ICD-10-CM

## 2023-01-19 DIAGNOSIS — Z79.4 TYPE 2 DIABETES MELLITUS WITHOUT COMPLICATION, WITH LONG-TERM CURRENT USE OF INSULIN (HCC): Primary | ICD-10-CM

## 2023-01-19 DIAGNOSIS — R14.0 ABDOMINAL BLOATING: ICD-10-CM

## 2023-01-19 PROBLEM — J96.22 ACUTE ON CHRONIC RESPIRATORY FAILURE WITH HYPOXIA AND HYPERCAPNIA (HCC): Status: RESOLVED | Noted: 2022-02-23 | Resolved: 2023-01-19

## 2023-01-19 PROBLEM — J90 PLEURAL EFFUSION, BILATERAL: Status: RESOLVED | Noted: 2022-02-23 | Resolved: 2023-01-19

## 2023-01-19 PROBLEM — J96.21 ACUTE ON CHRONIC RESPIRATORY FAILURE WITH HYPOXIA AND HYPERCAPNIA (HCC): Status: RESOLVED | Noted: 2022-02-23 | Resolved: 2023-01-19

## 2023-01-19 RX ORDER — FUROSEMIDE 40 MG/1
40 TABLET ORAL DAILY
COMMUNITY
Start: 2022-12-02

## 2023-01-19 RX ORDER — CARVEDILOL 12.5 MG/1
12.5 TABLET ORAL 2 TIMES DAILY WITH MEALS
COMMUNITY
Start: 2022-12-08

## 2023-01-19 RX ORDER — ENOXAPARIN SODIUM 100 MG/ML
INJECTION SUBCUTANEOUS
COMMUNITY
Start: 2022-10-20 | End: 2023-01-19

## 2023-01-19 NOTE — PROGRESS NOTES
Name: Britney Calderon      : 1965      MRN: [id number]  Encounter Provider: Nevaeh Gan MD  Encounter Date: 2023   Encounter department: [address] [place] [address]     1  Type 2 diabetes mellitus without complication, with long-term current use of insulin ([facility] )  Assessment & Plan:  As above  Lab Results   Component Value Date    HGBA1C 7 6 (H) 10/24/2022       Orders:  -     Hemoglobin A1C; Future; Expected date: 2023  -     Comprehensive metabolic panel; Future; Expected date: 2023  -     Microalbumin / creatinine urine ratio; Future; Expected date: 2023  -     Lipid Panel with Direct LDL reflex; Future; Expected date: 2023  -     CBC and Platelet; Future; Expected date: 2023  -     Ambulatory Referral to Optometry; Future    2  Diabetes mellitus with peripheral circulatory disorder, controlled [facility])  Assessment & Plan:    Lab Results   Component Value Date    HGBA1C 7 6 (H) 10/24/2022     Control has worsened a bit but he has improved his diet and will continue current medications and recheck labs in next 1 month or so  Advised on need for good foot and eye care  Orders:  -     Hemoglobin A1C; Future; Expected date: 2023  -     Comprehensive metabolic panel; Future; Expected date: 2023  -     Microalbumin / creatinine urine ratio; Future; Expected date: 2023  -     Lipid Panel with Direct LDL reflex; Future; Expected date: 2023  -     CBC and Platelet; Future; Expected date: 2023  -     Ambulatory Referral to Optometry; Future    3  Benign essential hypertension  Assessment & Plan:  Well controlled and will continue current medications as ordered  4  Mixed hyperlipidemia  Assessment & Plan:  Well controlled and will continue rosuvastatin as ordered  5  Paroxysmal atrial fibrillation [facility])  Assessment & Plan:  He denies current symptoms and has not had recurrence; continue cardiology follow up        6  Hypertensive heart disease with chronic combined systolic and diastolic congestive heart failure (HCC)    7  Abdominal bloating  -     Ambulatory Referral to Gastroenterology; Future    8  Need for vaccination  -     influenza vaccine, quadrivalent, recombinant, PF, 0 5 mL, for patients 18 yr+ (FLUBLOK)           Subjective      Here for DM follow up  He had his labwork in October  Denies hypoglycemia and states glucose levels have been well controlled  He is looking for a new eye doctor and would like referrals  Continues to follow with cardiology and we reviewed his recent echo  Review of Systems   Constitutional: Negative  Respiratory: Negative  Cardiovascular: Negative  Endocrine: Negative          Current Outpatient Medications on File Prior to Visit   Medication Sig   • amiodarone 200 mg tablet TAKE 1/2 TABLET BY MOUTH DAILY   • aspirin (ECOTRIN LOW STRENGTH) 81 mg EC tablet Take 1 tablet (81 mg total) by mouth daily   • B-D UF III MINI PEN NEEDLES 31G X 5 MM MISC USE AS DIRECTED   • Basaglar KwikPen 100 units/mL injection pen INJECT 68 UNITS UNDER THE SKIN DAILY AT BEDTIME   • CHUYITA MICROLET LANCETS lancets by Does not apply route   • carvedilol (COREG) 12 5 mg tablet Take 12 5 mg by mouth 2 (two) times a day with meals   • furosemide (LASIX) 40 mg tablet Take 40 mg by mouth daily   • Insulin Pen Needle 31G X 5 MM MISC Inject under the skin 4 (four) times a day   • rosuvastatin (CRESTOR) 20 MG tablet Take 20 mg by mouth daily   • spironolactone (ALDACTONE) 25 mg tablet Take 25 mg by mouth daily   • valsartan (DIOVAN) 40 mg tablet Take 40 mg by mouth daily   • warfarin (COUMADIN) 2 5 mg tablet Take 1 tablet (2 5 mg total) by mouth daily   • warfarin (Coumadin) 5 mg tablet Take one tablet by mouth daily or as directed by provider   • [DISCONTINUED] carvedilol (COREG) 3 125 mg tablet TAKE 1 TABLET (3 125 MG TOTAL) BY MOUTH 2 TIMES A DAY WITH MEALS (Patient not taking: Reported on 1/19/2023)   • [DISCONTINUED] enoxaparin (LOVENOX) 80 mg/0 8 mL TAKE BY INJECTION UNDER THE SKIN ONCE IN 24 HOURS ( 1 TONIGHT AND 1 TOMORROW NIGHT) (Patient not taking: Reported on 1/19/2023)   • [DISCONTINUED] furosemide (LASIX) 20 mg tablet TAKE 1 TABLET BY MOUTH EVERY DAY (Patient not taking: Reported on 1/19/2023)   • [DISCONTINUED] Jardiance 10 MG TABS tablet TAKE 1 TABLET (10 MG TOTAL) BY MOUTH EVERY MORNING  (Patient not taking: Reported on 10/18/2022)       Objective     /70   Pulse 68   Temp 97 8 °F (36 6 °C) (Tympanic)   Resp 18   Ht 5' 7" (1 702 m)   Wt 87 5 kg (193 lb)   SpO2 97%   BMI 30 23 kg/m²     Physical Exam  Constitutional:       Appearance: He is well-developed  Eyes:      Conjunctiva/sclera: Conjunctivae normal    Neck:      Thyroid: No thyromegaly  Vascular: No JVD  Cardiovascular:      Rate and Rhythm: Normal rate and regular rhythm  Heart sounds: Normal heart sounds  No murmur heard  No friction rub  No gallop  Pulmonary:      Effort: Pulmonary effort is normal       Breath sounds: Normal breath sounds  No wheezing or rales  Abdominal:      General: Bowel sounds are normal  There is no distension  Palpations: Abdomen is soft  Tenderness: There is no abdominal tenderness  Musculoskeletal:      Cervical back: Neck supple  Comments: He defers examination of his stump today         Minna Spatz, MD

## 2023-01-19 NOTE — ASSESSMENT & PLAN NOTE
Lab Results   Component Value Date    HGBA1C 7 6 (H) 10/24/2022     Control has worsened a bit but he has improved his diet and will continue current medications and recheck labs in next 1 month or so  Advised on need for good foot and eye care

## 2023-01-21 DIAGNOSIS — E11.51 DIABETES MELLITUS WITH PERIPHERAL CIRCULATORY DISORDER, CONTROLLED (HCC): ICD-10-CM

## 2023-01-23 RX ORDER — INSULIN GLARGINE 100 [IU]/ML
68 INJECTION, SOLUTION SUBCUTANEOUS
Qty: 15 ML | Refills: 5 | Status: SHIPPED | OUTPATIENT
Start: 2023-01-23

## 2023-02-06 ENCOUNTER — TELEPHONE (OUTPATIENT)
Dept: GASTROENTEROLOGY | Facility: AMBULARY SURGERY CENTER | Age: 58
End: 2023-02-06

## 2023-02-06 NOTE — TELEPHONE ENCOUNTER
left patient v/m informing him we have to move time of apt to 2 20 pm  On 02/07, apt dr Tiara Will, if not conveneinet please call back asap - shruthi

## 2023-02-07 ENCOUNTER — TELEPHONE (OUTPATIENT)
Dept: GASTROENTEROLOGY | Facility: AMBULARY SURGERY CENTER | Age: 58
End: 2023-02-07

## 2023-02-07 ENCOUNTER — CONSULT (OUTPATIENT)
Dept: GASTROENTEROLOGY | Facility: CLINIC | Age: 58
End: 2023-02-07

## 2023-02-07 ENCOUNTER — APPOINTMENT (OUTPATIENT)
Dept: URGENT CARE | Age: 58
End: 2023-02-07

## 2023-02-07 ENCOUNTER — APPOINTMENT (OUTPATIENT)
Dept: LAB | Age: 58
End: 2023-02-07

## 2023-02-07 ENCOUNTER — APPOINTMENT (OUTPATIENT)
Dept: RADIOLOGY | Age: 58
End: 2023-02-07

## 2023-02-07 DIAGNOSIS — Z00.00 PHYSICAL EXAM: ICD-10-CM

## 2023-02-07 DIAGNOSIS — R14.0 ABDOMINAL BLOATING: Primary | ICD-10-CM

## 2023-02-07 DIAGNOSIS — Z79.01 ANTICOAGULANT LONG-TERM USE: ICD-10-CM

## 2023-02-07 DIAGNOSIS — Z86.010 HISTORY OF COLON POLYPS: ICD-10-CM

## 2023-02-07 DIAGNOSIS — R14.0 ABDOMINAL DISTENSION: ICD-10-CM

## 2023-02-07 PROBLEM — Z86.0100 HISTORY OF COLON POLYPS: Status: ACTIVE | Noted: 2023-02-07

## 2023-02-07 LAB
BASOPHILS # BLD AUTO: 0.05 THOUSANDS/ÂΜL (ref 0–0.1)
BASOPHILS NFR BLD AUTO: 1 % (ref 0–1)
BUN SERPL-MCNC: 23 MG/DL (ref 5–25)
CREAT SERPL-MCNC: 1.07 MG/DL (ref 0.6–1.3)
EOSINOPHIL # BLD AUTO: 0.29 THOUSAND/ÂΜL (ref 0–0.61)
EOSINOPHIL NFR BLD AUTO: 4 % (ref 0–6)
ERYTHROCYTE [DISTWIDTH] IN BLOOD BY AUTOMATED COUNT: 13.2 % (ref 11.6–15.1)
GFR SERPL CREATININE-BSD FRML MDRD: 75 ML/MIN/1.73SQ M
HCT VFR BLD AUTO: 41.9 % (ref 36.5–49.3)
HGB BLD-MCNC: 13.5 G/DL (ref 12–17)
IMM GRANULOCYTES # BLD AUTO: 0.03 THOUSAND/UL (ref 0–0.2)
IMM GRANULOCYTES NFR BLD AUTO: 0 % (ref 0–2)
LYMPHOCYTES # BLD AUTO: 1.32 THOUSANDS/ÂΜL (ref 0.6–4.47)
LYMPHOCYTES NFR BLD AUTO: 19 % (ref 14–44)
MCH RBC QN AUTO: 31.3 PG (ref 26.8–34.3)
MCHC RBC AUTO-ENTMCNC: 32.2 G/DL (ref 31.4–37.4)
MCV RBC AUTO: 97 FL (ref 82–98)
MONOCYTES # BLD AUTO: 0.61 THOUSAND/ÂΜL (ref 0.17–1.22)
MONOCYTES NFR BLD AUTO: 9 % (ref 4–12)
NEUTROPHILS # BLD AUTO: 4.54 THOUSANDS/ÂΜL (ref 1.85–7.62)
NEUTS SEG NFR BLD AUTO: 67 % (ref 43–75)
NRBC BLD AUTO-RTO: 0 /100 WBCS
PLATELET # BLD AUTO: 226 THOUSANDS/UL (ref 149–390)
PMV BLD AUTO: 10.4 FL (ref 8.9–12.7)
RBC # BLD AUTO: 4.32 MILLION/UL (ref 3.88–5.62)
WBC # BLD AUTO: 6.84 THOUSAND/UL (ref 4.31–10.16)

## 2023-02-07 PROCEDURE — 86870 RBC ANTIBODY IDENTIFICATION: CPT

## 2023-02-07 PROCEDURE — 84520 ASSAY OF UREA NITROGEN: CPT

## 2023-02-07 PROCEDURE — 83825 ASSAY OF MERCURY: CPT | Performed by: PREVENTIVE MEDICINE

## 2023-02-07 PROCEDURE — 85025 COMPLETE CBC W/AUTO DIFF WBC: CPT

## 2023-02-07 PROCEDURE — 81001 URINALYSIS AUTO W/SCOPE: CPT | Performed by: PREVENTIVE MEDICINE

## 2023-02-07 PROCEDURE — 82570 ASSAY OF URINE CREATININE: CPT | Performed by: PREVENTIVE MEDICINE

## 2023-02-07 PROCEDURE — 82175 ASSAY OF ARSENIC: CPT | Performed by: PREVENTIVE MEDICINE

## 2023-02-07 PROCEDURE — 84202 ASSAY RBC PROTOPORPHYRIN: CPT

## 2023-02-07 PROCEDURE — 71045 X-RAY EXAM CHEST 1 VIEW: CPT

## 2023-02-07 PROCEDURE — 82232 ASSAY OF BETA-2 PROTEIN: CPT | Performed by: PREVENTIVE MEDICINE

## 2023-02-07 PROCEDURE — 82565 ASSAY OF CREATININE: CPT

## 2023-02-07 PROCEDURE — 82300 ASSAY OF CADMIUM: CPT

## 2023-02-07 PROCEDURE — 83655 ASSAY OF LEAD: CPT | Performed by: PREVENTIVE MEDICINE

## 2023-02-07 RX ORDER — PANTOPRAZOLE SODIUM 40 MG/1
40 TABLET, DELAYED RELEASE ORAL DAILY
Qty: 30 TABLET | Refills: 11 | Status: SHIPPED | OUTPATIENT
Start: 2023-02-07

## 2023-02-07 NOTE — ASSESSMENT & PLAN NOTE
Possible from diabetic gastroparesis or peptic ulcer disease/gastritis  Concerned about any other underlying pathology in view of worsening abdominal distention     -We will give a trial with pantoprazole 40 mg daily half an hour before breakfast    -Patient was explained about the lifestyle and dietary modifications  Advised to avoid fatty foods, chocolates, caffeine, alcohol and any other triggering foods  Avoid eating for at least 3 hours before going to bed     -Scheduled for EGD    -Advised about small frequent low-fat meal    -Patient was explained about  the risks and benefits of the procedure  Risks including but not limited to bleeding, infection, perforation were explained in detail  Also explained about less than 100% sensitivity with the exam and other alternatives

## 2023-02-07 NOTE — ASSESSMENT & PLAN NOTE
Patient is at increased risks because of anticoagulation therapy status post CABG and aortic valve replacement recently      Advised patient to check with his cardiologist and surgeon about holding Coumadin prior to the procedures or if he needs to have bridging therapy with Lovenox    If it is too risky to stop the Coumadin we can consider diagnostic EGD

## 2023-02-07 NOTE — TELEPHONE ENCOUNTER
Patient has recently valve surgery as well as atrial fibrillation  We may need to bridge with Lovenox  So we need to work it out with him  Lets talk it tomorrow and then will tell you the plan

## 2023-02-07 NOTE — PATIENT INSTRUCTIONS
Scheduled date of EGD(as of today): 03/10/23  Physician performing EGD: Eulice Brittle  [facility]  Instructions reviewed with patient by: [place]  Clearances: COUMADIN HOLD REQUEST FAXED TO DR Nancie Carranza

## 2023-02-07 NOTE — PROGRESS NOTES
Consultation - [facility]  Osmin Sanchez 1965 male         Chief Complaint: Abdominal bloating and distention    HPI: 57-year-old male with history of diabetes mellitus, hypertension, hyperlipidemia, CHF, atrial fibrillation, coronary artery disease status post CABG and aortic valve replacement 3 months ago came in because of symptoms of bloating and abdominal distention since surgery  He feels bloated with fullness in the stomach even after small meal   Denies any nausea or vomiting  He also noticed worsening abdominal distention  Regular bowel movements and denies any blood or mucus in the stool  Good appetite, no recent weight loss  He reports having problems with holding urine and urgency  Occasional acid reflux for which he takes Nexium as needed  He has history of colon polyps and the last colonoscopy was in October 2018  He was advised to come back in 5 years  Chaperon: Ms Marcin White: Review of Systems   Constitutional: Negative for activity change, appetite change, chills, diaphoresis, fatigue, fever and unexpected weight change  HENT: Negative for ear discharge, ear pain, facial swelling, hearing loss, nosebleeds, sore throat, tinnitus and voice change  Eyes: Negative for pain, discharge, redness, itching and visual disturbance  Respiratory: Negative for apnea, cough, chest tightness, shortness of breath and wheezing  Cardiovascular: Negative for chest pain and palpitations  Gastrointestinal:        As noted in HPI   Endocrine: Negative for cold intolerance, heat intolerance and polyuria  Genitourinary: Positive for urgency  Negative for difficulty urinating, dysuria, flank pain and hematuria  Musculoskeletal: Negative for arthralgias, back pain, gait problem, joint swelling and myalgias  Skin: Negative for rash and wound     Neurological: Negative for dizziness, tremors, seizures, speech difficulty, light-headedness, numbness and headaches  Hematological: Negative for adenopathy  Does not bruise/bleed easily  Psychiatric/Behavioral: Negative for agitation, behavioral problems and confusion  The patient is not nervous/anxious  Past Medical History:   Diagnosis Date   • Diabetes mellitus ([facility] [address] )    • Hyperlipidemia    • Hypertension       Past Surgical History:   Procedure Laterality Date   • ARTERIAL BYPASS SURGERY     • BELOW KNEE LEG AMPUTATION     • IR THORACENTESIS  2022     Social History     Socioeconomic History   • Marital status: /Civil Union     Spouse name: Not on file   • Number of children: Not on file   • Years of education: Not on file   • Highest education level: Not on file   Occupational History   • Not on file   Tobacco Use   • Smoking status: Former     Types: Cigarettes     Quit date:      Years since quittin 1   • Smokeless tobacco: Never   Vaping Use   • Vaping Use: Never used   Substance and Sexual Activity   • Alcohol use: Not Currently   • Drug use: Never   • Sexual activity: Not on file   Other Topics Concern   • Not on file   Social History Narrative   • Not on file     Social Determinants of Health     Financial Resource Strain: Not on file   Food Insecurity: No Food Insecurity   • Worried About Running Out of Food in the Last Year: Never true   • Ran Out of Food in the Last Year: Never true   Transportation Needs: No Transportation Needs   • Lack of Transportation (Medical): No   • Lack of Transportation (Non-Medical):  No   Physical Activity: Not on file   Stress: Not on file   Social Connections: Not on file   Intimate Partner Violence: Not on file   Housing Stability: Unknown   • Unable to Pay for Housing in the Last Year: No   • Number of Places Lived in the Last Year: Not on file   • Unstable Housing in the Last Year: No     Family History   Problem Relation Age of Onset   • No Known Problems Mother      Bee venom and Penicillins  Current Outpatient Medications   Medication Sig Dispense Refill   • amiodarone 200 mg tablet TAKE 1/2 TABLET BY MOUTH DAILY 45 tablet 1   • aspirin (ECOTRIN LOW STRENGTH) 81 mg EC tablet Take 1 tablet (81 mg total) by mouth daily  0   • Basaglar KwikPen 100 units/mL SOPN INJECT 68 UNITS UNDER THE SKIN DAILY AT BEDTIME 15 mL 5   • carvedilol (COREG) 12 5 mg tablet Take 12 5 mg by mouth 2 (two) times a day with meals     • Empagliflozin (Jardiance) 10 MG TABS tablet Take 10 mg by mouth every morning     • furosemide (LASIX) 40 mg tablet Take 40 mg by mouth daily     • pantoprazole (PROTONIX) 40 mg tablet Take 1 tablet (40 mg total) by mouth daily 30 tablet 11   • rosuvastatin (CRESTOR) 20 MG tablet Take 20 mg by mouth daily     • spironolactone (ALDACTONE) 25 mg tablet Take 25 mg by mouth daily     • valsartan (DIOVAN) 40 mg tablet Take 40 mg by mouth daily     • warfarin (COUMADIN) 2 5 mg tablet Take 1 tablet (2 5 mg total) by mouth daily 90 tablet 3   • warfarin (Coumadin) 5 mg tablet Take one tablet by mouth daily or as directed by provider 90 tablet 3   • B-D UF III MINI PEN NEEDLES 31G X 5 MM MISC USE AS DIRECTED (Patient not taking: Reported on 2/7/2023) 50 each 0   • CHUYITA MICROLET LANCETS lancets by Does not apply route (Patient not taking: Reported on 2/7/2023)     • Insulin Pen Needle 31G X 5 MM MISC Inject under the skin 4 (four) times a day (Patient not taking: Reported on 2/7/2023) 360 each 3     No current facility-administered medications for this visit  There were no vitals taken for this visit  PHYSICAL EXAM: Physical Exam  Constitutional:       Appearance: He is well-developed  HENT:      Head: Normocephalic and atraumatic  Eyes:      General: No scleral icterus  Right eye: No discharge  Left eye: No discharge  Conjunctiva/sclera: Conjunctivae normal       Pupils: Pupils are equal, round, and reactive to light  Neck:      Thyroid: No thyromegaly  Vascular: No JVD  Trachea: No tracheal deviation  Cardiovascular:      Rate and Rhythm: Normal rate and regular rhythm  Heart sounds: Normal heart sounds  No murmur heard  No friction rub  No gallop  Pulmonary:      Effort: Pulmonary effort is normal  No respiratory distress  Breath sounds: Normal breath sounds  No wheezing or rales  Chest:      Chest wall: No tenderness  Abdominal:      General: Bowel sounds are normal  There is distension  Palpations: Abdomen is soft  There is no mass  Tenderness: There is no abdominal tenderness  There is no guarding or rebound  Hernia: No hernia is present  Musculoskeletal:      Cervical back: Neck supple  Lymphadenopathy:      Cervical: No cervical adenopathy  Skin:     General: Skin is warm and dry  Findings: No erythema or rash  Neurological:      Mental Status: He is alert and oriented to person, place, and time  Psychiatric:         Behavior: Behavior normal          Thought Content: Thought content normal           Lab Results   Component Value Date    WBC 6 84 02/07/2023    HGB 13 5 02/07/2023    HCT 41 9 02/07/2023    MCV 97 02/07/2023     02/07/2023     Lab Results   Component Value Date    GLUCOSE 203 (H) 10/30/2017    CALCIUM 8 1 (L) 02/23/2022     10/30/2017    K 3 7 10/24/2022    CO2 24 10/24/2022     10/24/2022    BUN 23 02/07/2023    CREATININE 1 07 02/07/2023     Lab Results   Component Value Date    ALT 25 10/24/2022    AST 29 10/24/2022    ALKPHOS 99 02/23/2022    BILITOT 0 4 10/30/2017     Lab Results   Component Value Date    INR 1 80 (A) 11/26/2022    INR 2 2 (H) 11/11/2022    INR 1 50 (A) 10/31/2022    PROTIME 21 8 (H) 11/11/2022    PROTIME 14 1 02/23/2022       No results found  ASSESSMENT & PLAN:    Abdominal bloating  Possible from diabetic gastroparesis or peptic ulcer disease/gastritis    Concerned about any other underlying pathology in view of worsening abdominal distention     -We will give a trial with pantoprazole 40 mg daily half an hour before breakfast    -Patient was explained about the lifestyle and dietary modifications  Advised to avoid fatty foods, chocolates, caffeine, alcohol and any other triggering foods  Avoid eating for at least 3 hours before going to bed     -Scheduled for EGD    -Advised about small frequent low-fat meal    -Patient was explained about  the risks and benefits of the procedure  Risks including but not limited to bleeding, infection, perforation were explained in detail  Also explained about less than 100% sensitivity with the exam and other alternatives  Abdominal distension    -Treatment plan as described above    -Check CT scan of the abdomen pelvis    Anticoagulant long-term use  Patient is at increased risks because of anticoagulation therapy status post CABG and aortic valve replacement recently      Advised patient to check with his cardiologist and surgeon about holding Coumadin prior to the procedures or if he needs to have bridging therapy with Lovenox    If it is too risky to stop the Coumadin we can consider diagnostic EGD    History of colon polyps    -Due for next surveillance colonoscopy in October 2023

## 2023-02-07 NOTE — TELEPHONE ENCOUNTER
Our mutual patient is scheduled for procedure: EGD    On: __3__/_  10  _/_ 2023   _      With:   ____AROLDO_____    He/She is taking the following blood thinner:   COUMADIN/CARDIAC CLEARANCE REQUESTED PER DR [facility]       Can this be stopped _5 DAYS OR BRIDGE_____ days prior to the procedure?       Physician Approving clearance: ________________________

## 2023-02-08 LAB
LEAD BLD-MCNC: 1.6 UG/DL (ref 0–3.4)
ZPP RBC-MCNC: 21 UG/DL (ref 0–99)

## 2023-02-09 LAB — CADMIUM BLD-MCNC: <0.5 UG/L (ref 0–1.2)

## 2023-02-20 ENCOUNTER — HOSPITAL ENCOUNTER (OUTPATIENT)
Dept: RADIOLOGY | Facility: HOSPITAL | Age: 58
Discharge: HOME | End: 2023-02-20
Attending: INTERNAL MEDICINE

## 2023-02-20 DIAGNOSIS — R14.0 ABDOMINAL DISTENSION: ICD-10-CM

## 2023-02-20 DIAGNOSIS — R14.0 ABDOMINAL BLOATING: ICD-10-CM

## 2023-02-20 RX ADMIN — IOHEXOL 100 ML: 350 INJECTION, SOLUTION INTRAVENOUS at 08:14

## 2023-02-27 ENCOUNTER — TELEPHONE (OUTPATIENT)
Dept: GASTROENTEROLOGY | Facility: AMBULARY SURGERY CENTER | Age: 58
End: 2023-02-27

## 2023-02-27 NOTE — TELEPHONE ENCOUNTER
Damon Rangel/RN @ [facility] pre admission/fax med clearance to Dr Christina Agee @ [facility]/pt is scheduled for EGD w/ Dr Shelby Washington at [place] [id number] 3/10/2023

## 2023-02-28 LAB
LEFT EYE DIABETIC RETINOPATHY: POSITIVE
RIGHT EYE DIABETIC RETINOPATHY: POSITIVE

## 2023-03-08 ENCOUNTER — NURSE TRIAGE (OUTPATIENT)
Dept: OTHER | Facility: OTHER | Age: 58
End: 2023-03-08

## 2023-03-08 NOTE — TELEPHONE ENCOUNTER
Patient called in to inquire about medications he should take or stop prior to his EGD scheduled for Friday 3/10/23  Triage RN reviewed PAT list of medications and confirmed with patient to take his amiodarone, carvedilol, and valsartan prior to procedure with small sip of water  Take Basaglar injection Thursday night prior to procedure  Hold all other oral medications  Triage RN did not see approval from cardiologist regarding warfarin dose  Patient reports he has held medication since Sunday March 5th  Please follow up with patient regarding warfarin medication  Reason for Disposition  • Endoscopy, getting ready, questions about    Answer Assessment - Initial Assessment Questions  1  DATE/TIME: "When did you have your endoscopy?"       Scheduled for Friday 3/10/23  2   MAIN CONCERN: "What is your main concern right now?" "What questions do you have?"      Questions about medications prior to procedure    Protocols used: ENDOSCOPY (UPPER GI) SYMPTOMS AND QUESTIONS-ADULT-

## 2023-03-08 NOTE — TELEPHONE ENCOUNTER
Regarding: EGD med prep  ----- Message from Sandra Alberts sent at 3/8/2023 10:43 AM EST -----  "I'm scheduled for an EGD on Friday   What medications am I supposed to stop taking?"

## 2023-03-09 RX ORDER — SODIUM CHLORIDE, SODIUM LACTATE, POTASSIUM CHLORIDE, CALCIUM CHLORIDE 600; 310; 30; 20 MG/100ML; MG/100ML; MG/100ML; MG/100ML
75 INJECTION, SOLUTION INTRAVENOUS CONTINUOUS
Status: CANCELLED | OUTPATIENT
Start: 2023-03-09

## 2023-03-10 ENCOUNTER — HOSPITAL ENCOUNTER (OUTPATIENT)
Dept: GASTROENTEROLOGY | Facility: AMBULARY SURGERY CENTER | Age: 58
Setting detail: OUTPATIENT SURGERY
End: 2023-03-10
Attending: INTERNAL MEDICINE

## 2023-03-10 ENCOUNTER — ANESTHESIA (OUTPATIENT)
Dept: GASTROENTEROLOGY | Facility: AMBULARY SURGERY CENTER | Age: 58
End: 2023-03-10

## 2023-03-10 ENCOUNTER — ANESTHESIA EVENT (OUTPATIENT)
Dept: GASTROENTEROLOGY | Facility: AMBULARY SURGERY CENTER | Age: 58
End: 2023-03-10

## 2023-03-10 VITALS
HEART RATE: 58 BPM | DIASTOLIC BLOOD PRESSURE: 70 MMHG | RESPIRATION RATE: 16 BRPM | SYSTOLIC BLOOD PRESSURE: 128 MMHG | TEMPERATURE: 97.1 F | OXYGEN SATURATION: 98 %

## 2023-03-10 DIAGNOSIS — R14.0 ABDOMINAL BLOATING: ICD-10-CM

## 2023-03-10 LAB — GLUCOSE SERPL-MCNC: 119 MG/DL (ref 65–140)

## 2023-03-10 RX ORDER — SODIUM CHLORIDE, SODIUM LACTATE, POTASSIUM CHLORIDE, CALCIUM CHLORIDE 600; 310; 30; 20 MG/100ML; MG/100ML; MG/100ML; MG/100ML
INJECTION, SOLUTION INTRAVENOUS CONTINUOUS PRN
Status: DISCONTINUED | OUTPATIENT
Start: 2023-03-10 | End: 2023-03-10

## 2023-03-10 RX ORDER — PROPOFOL 10 MG/ML
INJECTION, EMULSION INTRAVENOUS AS NEEDED
Status: DISCONTINUED | OUTPATIENT
Start: 2023-03-10 | End: 2023-03-10

## 2023-03-10 RX ORDER — SODIUM CHLORIDE, SODIUM LACTATE, POTASSIUM CHLORIDE, CALCIUM CHLORIDE 600; 310; 30; 20 MG/100ML; MG/100ML; MG/100ML; MG/100ML
75 INJECTION, SOLUTION INTRAVENOUS CONTINUOUS
Status: DISCONTINUED | OUTPATIENT
Start: 2023-03-10 | End: 2023-03-14 | Stop reason: HOSPADM

## 2023-03-10 RX ORDER — LIDOCAINE HYDROCHLORIDE 10 MG/ML
INJECTION, SOLUTION EPIDURAL; INFILTRATION; INTRACAUDAL; PERINEURAL AS NEEDED
Status: DISCONTINUED | OUTPATIENT
Start: 2023-03-10 | End: 2023-03-10

## 2023-03-10 RX ADMIN — LIDOCAINE HYDROCHLORIDE 50 MG: 10 INJECTION, SOLUTION EPIDURAL; INFILTRATION; INTRACAUDAL; PERINEURAL at 12:23

## 2023-03-10 RX ADMIN — PROPOFOL 150 MG: 10 INJECTION, EMULSION INTRAVENOUS at 12:23

## 2023-03-10 RX ADMIN — SODIUM CHLORIDE, SODIUM LACTATE, POTASSIUM CHLORIDE, AND CALCIUM CHLORIDE 75 ML/HR: .6; .31; .03; .02 INJECTION, SOLUTION INTRAVENOUS at 11:02

## 2023-03-10 RX ADMIN — SODIUM CHLORIDE, SODIUM LACTATE, POTASSIUM CHLORIDE, AND CALCIUM CHLORIDE: .6; .31; .03; .02 INJECTION, SOLUTION INTRAVENOUS at 11:58

## 2023-03-10 RX ADMIN — PROPOFOL 50 MG: 10 INJECTION, EMULSION INTRAVENOUS at 12:27

## 2023-03-10 NOTE — ANESTHESIA PREPROCEDURE EVALUATION
Procedure:  EGD    Relevant Problems   CARDIO   (+) Benign essential hypertension   (+) Chronic systolic congestive heart failure (HCC)   (+) Hyperlipidemia   (+) PVD (peripheral vascular disease) (HCC)   (+) Paroxysmal atrial fibrillation (HCC)   (+) S/P AVR (aortic valve replacement) with bioprosthetic valve 23 mm at [facility]   (+) S/P CABG x 3      ENDO   (+) Type 2 diabetes mellitus, with long-term current use of insulin (HCC)      GI/HEPATIC   (+) Gastroesophageal reflux disease without esophagitis      NEURO/PSYCH   (+) Anxiety disorder   (+) History of colon polyps      Other   (+) S/P unilateral BKA (below knee amputation), left (HCC)        Physical Exam    Airway    Mallampati score: III  TM Distance: >3 FB  Neck ROM: full     Dental   upper dentures and lower dentures,     Cardiovascular  Rhythm: regular, Rate: normal,     Pulmonary  Breath sounds clear to auscultation,     Other Findings        Anesthesia Plan  ASA Score- 3     Anesthesia Type- IV sedation with anesthesia with ASA Monitors  Additional Monitors:   Airway Plan:           Plan Factors-    Chart reviewed  Patient is not a current smoker  Induction- intravenous  Postoperative Plan-     Informed Consent- Anesthetic plan and risks discussed with patient  I personally reviewed this patient with the CRNA  Discussed and agreed on the Anesthesia Plan with the CRNA  Thao Bermudez

## 2023-03-10 NOTE — ANESTHESIA POSTPROCEDURE EVALUATION
Post-Op Assessment Note    CV Status:  Stable  Pain Score: 0    Pain management: adequate     Mental Status:  Sleepy   Hydration Status:  Stable   PONV Controlled:  None   Airway Patency:  Patent      Post Op Vitals Reviewed: Yes      Staff: Anesthesiologist         No notable events documented      BP 93/55   Temp     Pulse 52   Resp 24   SpO2 100%

## 2023-03-10 NOTE — H&P
History and Physical - [facility]  Nannette Alfred 59 y o  male MRN: [id number]        HPI: 78-year-old male with history of diabetes mellitus, hypertension was seen in the office because of bloating and discomfort  Historical Information   Past Medical History:   Diagnosis Date   • Diabetes mellitus ([address] )    • GERD (gastroesophageal reflux disease)    • Hyperlipidemia    • Hypertension      Past Surgical History:   Procedure Laterality Date   • ARTERIAL BYPASS SURGERY     • BELOW KNEE LEG AMPUTATION Left    • IR THORACENTESIS  2022     Social History   Social History     Substance and Sexual Activity   Alcohol Use Not Currently     Social History     Substance and Sexual Activity   Drug Use Never     Social History     Tobacco Use   Smoking Status Former   • Types: Cigarettes   • Quit date:    • Years since quittin 2   Smokeless Tobacco Never     Family History   Problem Relation Age of Onset   • No Known Problems Mother        Meds/Allergies     (Not in a hospital admission)      Allergies   Allergen Reactions   • Bee Venom Anaphylaxis     Reaction Date: 2005;    • Penicillins Rash     Reaction Date: 2005; Objective     Blood pressure 114/64, pulse 56, temperature (!) 97 1 °F (36 2 °C), temperature source Temporal, resp  rate 18, SpO2 98 %      PHYSICAL EXAM:    Gen: NAD  CV: S1 & S2 normal, RRR  CHEST: Clear to auscultate  ABD: soft, NT/ND, good bowel sounds  EXT: no edema    ASSESSMENT:     Dyspepsia    PLAN:    EGD

## 2023-03-31 ENCOUNTER — APPOINTMENT (OUTPATIENT)
Dept: LAB | Age: 58
End: 2023-03-31

## 2023-03-31 DIAGNOSIS — Z13.9 SCREENING DUE: ICD-10-CM

## 2023-03-31 PROCEDURE — 86870 RBC ANTIBODY IDENTIFICATION: CPT

## 2023-03-31 PROCEDURE — 84202 ASSAY RBC PROTOPORPHYRIN: CPT

## 2023-04-03 LAB
LEAD BLD-MCNC: 1.6 UG/DL (ref 0–3.4)
ZPP RBC-MCNC: 22 UG/DL (ref 0–99)

## 2023-05-25 ENCOUNTER — OFFICE VISIT (OUTPATIENT)
Dept: FAMILY MEDICINE CLINIC | Facility: CLINIC | Age: 58
End: 2023-05-25

## 2023-05-25 VITALS
HEIGHT: 67 IN | TEMPERATURE: 97.7 F | HEART RATE: 63 BPM | WEIGHT: 196.6 LBS | SYSTOLIC BLOOD PRESSURE: 132 MMHG | DIASTOLIC BLOOD PRESSURE: 76 MMHG | RESPIRATION RATE: 19 BRPM | BODY MASS INDEX: 30.86 KG/M2

## 2023-05-25 DIAGNOSIS — I10 BENIGN ESSENTIAL HYPERTENSION: Chronic | ICD-10-CM

## 2023-05-25 DIAGNOSIS — E11.9 TYPE 2 DIABETES MELLITUS WITHOUT COMPLICATION, WITH LONG-TERM CURRENT USE OF INSULIN (HCC): Primary | ICD-10-CM

## 2023-05-25 DIAGNOSIS — E78.2 MIXED HYPERLIPIDEMIA: Chronic | ICD-10-CM

## 2023-05-25 DIAGNOSIS — Z79.4 TYPE 2 DIABETES MELLITUS WITHOUT COMPLICATION, WITH LONG-TERM CURRENT USE OF INSULIN (HCC): Primary | ICD-10-CM

## 2023-05-25 PROBLEM — Z79.01 ANTICOAGULANT LONG-TERM USE: Status: RESOLVED | Noted: 2023-02-07 | Resolved: 2023-05-25

## 2023-05-25 PROBLEM — R14.0 ABDOMINAL BLOATING: Status: RESOLVED | Noted: 2023-02-07 | Resolved: 2023-05-25

## 2023-05-25 NOTE — ASSESSMENT & PLAN NOTE
Lab Results   Component Value Date    HGBA1C 7 6 (H) 10/24/2022     Await labs for control level, for now will continue current dose of basaglar and jardiance  Encouraged good foot and eye care  Supplies for stump care provided

## 2023-05-25 NOTE — ASSESSMENT & PLAN NOTE
Has been well controlled on rosuvastatin and will continue as ordered  Await labs  Encouraged low fat diet and exercise

## 2023-05-25 NOTE — PROGRESS NOTES
Name: Jean Carlos Newman      : 1965      MRN: [id number]  Encounter Provider: Bob Lucas MD  Encounter Date: 2023   Encounter department: [facility]     1  Type 2 diabetes mellitus without complication, with long-term current use of insulin [facility])  Assessment & Plan:    Lab Results   Component Value Date    HGBA1C 7 6 (H) 10/24/2022     Await labs for control level, for now will continue current dose of basaglar and jardiance  Encouraged good foot and eye care  Supplies for stump care provided  Orders:  -     Albumin / creatinine urine ratio    2  Benign essential hypertension  Assessment & Plan:  Well controlled and will continue current medications as ordered  Orders:  -     Albumin / creatinine urine ratio    3  Mixed hyperlipidemia  Assessment & Plan:  Has been well controlled on rosuvastatin and will continue as ordered  Await labs  Encouraged low fat diet and exercise  Orders:  -     Albumin / creatinine urine ratio           Subjective      Here for a follow up visit  He states he just got his labs drawn this morning  Needs supplies for his BKA and prosthetic  Rx written  Denies chest pain or dyspnea  Continues to follow with cardiology  On coumadin, compliant with dosing and labs  No hypoglycemia  Trying to be active but now working midnight shift and this is hard  Review of Systems   Constitutional: Negative  Respiratory: Negative  Cardiovascular: Negative  Endocrine: Negative          Current Outpatient Medications on File Prior to Visit   Medication Sig   • amiodarone 200 mg tablet TAKE 1/2 TABLET BY MOUTH DAILY   • aspirin (ECOTRIN LOW STRENGTH) 81 mg EC tablet Take 1 tablet (81 mg total) by mouth daily   • Basaglar KwikPen 100 units/mL SOPN INJECT 68 UNITS UNDER THE SKIN DAILY AT BEDTIME   • carvedilol (COREG) 12 5 mg tablet Take 12 5 mg by mouth 2 (two) times a day with meals   • Empagliflozin (JARDIANCE) 10 MG TABS tablet "Take 10 mg by mouth every morning   • furosemide (LASIX) 40 mg tablet Take 40 mg by mouth daily   • pantoprazole (PROTONIX) 40 mg tablet Take 1 tablet (40 mg total) by mouth daily   • rosuvastatin (CRESTOR) 20 MG tablet Take 20 mg by mouth daily   • spironolactone (ALDACTONE) 25 mg tablet Take 25 mg by mouth daily   • valsartan (DIOVAN) 40 mg tablet Take 40 mg by mouth daily   • warfarin (COUMADIN) 2 5 mg tablet Take 1 tablet (2 5 mg total) by mouth daily (Patient taking differently: Take 2 5 mg by mouth daily 2 days / week)   • warfarin (Coumadin) 5 mg tablet Take one tablet by mouth daily or as directed by provider (Patient taking differently: 5 days /week)   • [DISCONTINUED] B-D UF III MINI PEN NEEDLES 31G X 5 MM MISC USE AS DIRECTED   • [DISCONTINUED] CHUYITA MICROLET LANCETS lancets Use   • [DISCONTINUED] Insulin Pen Needle 31G X 5 MM MISC Inject under the skin 4 (four) times a day       Objective     /76   Pulse 63   Temp 97 7 °F (36 5 °C)   Resp 19   Ht 5' 7\" (1 702 m)   Wt 89 2 kg (196 lb 9 6 oz)   BMI 30 79 kg/m²     Physical Exam  Constitutional:       Appearance: He is well-developed  Eyes:      Conjunctiva/sclera: Conjunctivae normal    Neck:      Thyroid: No thyromegaly  Vascular: No JVD  Cardiovascular:      Rate and Rhythm: Normal rate and regular rhythm  Pulses: no weak pulses     Heart sounds: Normal heart sounds  No murmur heard  No friction rub  No gallop  Pulmonary:      Effort: Pulmonary effort is normal       Breath sounds: Normal breath sounds  No wheezing or rales  Abdominal:      General: Bowel sounds are normal  There is no distension  Palpations: Abdomen is soft  Tenderness: There is no abdominal tenderness  Musculoskeletal:      Cervical back: Neck supple  Feet:      Right foot:      Skin integrity: No ulcer, skin breakdown, erythema, warmth, callus or dry skin        Left foot: amputated  Skin:     Comments: Skin of L stump warm and dry, no " lesions or ulcerations  Patient's shoes and socks removed  Right Foot/Ankle   Right Foot Inspection  Skin Exam: skin normal and skin intact  No dry skin, no warmth, no callus, no erythema, no maceration, no abnormal color, no pre-ulcer, no ulcer and no callus       Left Foot/Ankle  Left Foot Inspection  Amputation: amputation left foot     Assign Risk Category  No deformity present  No loss of protective sensation  No weak pulses  Risk: 0      Yohana Prabhakar MD

## 2023-05-26 LAB
ALBUMIN/CREAT UR: 1154 MG/G CREAT (ref 0–29)
CREAT UR-MCNC: 72.6 MG/DL
MICROALBUMIN UR-MCNC: 837.9 UG/ML

## 2023-06-05 DIAGNOSIS — I50.22 CHRONIC SYSTOLIC CONGESTIVE HEART FAILURE (HCC): Primary | ICD-10-CM

## 2023-06-05 RX ORDER — FUROSEMIDE 40 MG/1
TABLET ORAL
Qty: 90 TABLET | Refills: 3 | Status: SHIPPED | OUTPATIENT
Start: 2023-06-05

## 2023-06-14 DIAGNOSIS — E11.51 DIABETES MELLITUS WITH PERIPHERAL CIRCULATORY DISORDER, CONTROLLED (HCC): ICD-10-CM

## 2023-06-14 RX ORDER — INSULIN GLARGINE 100 [IU]/ML
68 INJECTION, SOLUTION SUBCUTANEOUS
Qty: 15 ML | Refills: 5 | Status: SHIPPED | OUTPATIENT
Start: 2023-06-14

## 2023-07-18 DIAGNOSIS — I48.0 PAROXYSMAL ATRIAL FIBRILLATION (HCC): ICD-10-CM

## 2023-07-18 RX ORDER — AMIODARONE HYDROCHLORIDE 200 MG/1
TABLET ORAL
Qty: 45 TABLET | Refills: 1 | Status: SHIPPED | OUTPATIENT
Start: 2023-07-18

## 2023-08-30 ENCOUNTER — APPOINTMENT (OUTPATIENT)
Dept: LAB | Age: 58
End: 2023-08-30

## 2023-08-30 ENCOUNTER — APPOINTMENT (OUTPATIENT)
Dept: URGENT CARE | Age: 58
End: 2023-08-30

## 2023-08-30 ENCOUNTER — APPOINTMENT (OUTPATIENT)
Dept: RADIOLOGY | Age: 58
End: 2023-08-30

## 2023-08-30 DIAGNOSIS — Z00.00 ANNUAL PHYSICAL EXAM: ICD-10-CM

## 2023-08-30 LAB
BASOPHILS # BLD AUTO: 0.06 THOUSANDS/ÂΜL (ref 0–0.1)
BASOPHILS NFR BLD AUTO: 1 % (ref 0–1)
BUN SERPL-MCNC: 29 MG/DL (ref 5–25)
CREAT SERPL-MCNC: 1.54 MG/DL (ref 0.6–1.3)
EOSINOPHIL # BLD AUTO: 0.27 THOUSAND/ÂΜL (ref 0–0.61)
EOSINOPHIL NFR BLD AUTO: 4 % (ref 0–6)
ERYTHROCYTE [DISTWIDTH] IN BLOOD BY AUTOMATED COUNT: 13.5 % (ref 11.6–15.1)
GFR SERPL CREATININE-BSD FRML MDRD: 48 ML/MIN/1.73SQ M
HCT VFR BLD AUTO: 37 % (ref 36.5–49.3)
HGB BLD-MCNC: 11.7 G/DL (ref 12–17)
IMM GRANULOCYTES # BLD AUTO: 0.04 THOUSAND/UL (ref 0–0.2)
IMM GRANULOCYTES NFR BLD AUTO: 1 % (ref 0–2)
LYMPHOCYTES # BLD AUTO: 1.45 THOUSANDS/ÂΜL (ref 0.6–4.47)
LYMPHOCYTES NFR BLD AUTO: 21 % (ref 14–44)
MCH RBC QN AUTO: 30.4 PG (ref 26.8–34.3)
MCHC RBC AUTO-ENTMCNC: 31.6 G/DL (ref 31.4–37.4)
MCV RBC AUTO: 96 FL (ref 82–98)
MONOCYTES # BLD AUTO: 0.64 THOUSAND/ÂΜL (ref 0.17–1.22)
MONOCYTES NFR BLD AUTO: 10 % (ref 4–12)
NEUTROPHILS # BLD AUTO: 4.3 THOUSANDS/ÂΜL (ref 1.85–7.62)
NEUTS SEG NFR BLD AUTO: 63 % (ref 43–75)
NRBC BLD AUTO-RTO: 0 /100 WBCS
PLATELET # BLD AUTO: 198 THOUSANDS/UL (ref 149–390)
PMV BLD AUTO: 10.8 FL (ref 8.9–12.7)
RBC # BLD AUTO: 3.85 MILLION/UL (ref 3.88–5.62)
WBC # BLD AUTO: 6.76 THOUSAND/UL (ref 4.31–10.16)

## 2023-08-30 PROCEDURE — 83825 ASSAY OF MERCURY: CPT | Performed by: PREVENTIVE MEDICINE

## 2023-08-30 PROCEDURE — 82565 ASSAY OF CREATININE: CPT

## 2023-08-30 PROCEDURE — 71045 X-RAY EXAM CHEST 1 VIEW: CPT

## 2023-08-30 PROCEDURE — 85025 COMPLETE CBC W/AUTO DIFF WBC: CPT

## 2023-08-30 PROCEDURE — 82232 ASSAY OF BETA-2 PROTEIN: CPT | Performed by: PREVENTIVE MEDICINE

## 2023-08-30 PROCEDURE — 83655 ASSAY OF LEAD: CPT | Performed by: PREVENTIVE MEDICINE

## 2023-08-30 PROCEDURE — 82300 ASSAY OF CADMIUM: CPT

## 2023-08-30 PROCEDURE — 81001 URINALYSIS AUTO W/SCOPE: CPT | Performed by: PREVENTIVE MEDICINE

## 2023-08-30 PROCEDURE — 82175 ASSAY OF ARSENIC: CPT | Performed by: PREVENTIVE MEDICINE

## 2023-08-30 PROCEDURE — 82570 ASSAY OF URINE CREATININE: CPT | Performed by: PREVENTIVE MEDICINE

## 2023-08-30 PROCEDURE — 86870 RBC ANTIBODY IDENTIFICATION: CPT

## 2023-08-30 PROCEDURE — 84520 ASSAY OF UREA NITROGEN: CPT

## 2023-08-30 PROCEDURE — 84202 ASSAY RBC PROTOPORPHYRIN: CPT

## 2023-08-31 LAB
LEAD BLD-MCNC: 1.5 UG/DL (ref 0–3.4)
ZPP RBC-MCNC: 22 UG/DL (ref 0–99)

## 2023-09-02 LAB — CADMIUM BLD-MCNC: <0.5 UG/L (ref 0–1.2)

## 2023-10-02 ENCOUNTER — TELEPHONE (OUTPATIENT)
Age: 58
End: 2023-10-02

## 2023-10-02 NOTE — TELEPHONE ENCOUNTER
Spoke with patient, patient is requesting to get a script for his prostethics. The supplies are two liners, two knee sleeves for [facility] Dr. Kelly Jean [phone]. Please advise.

## 2023-10-16 ENCOUNTER — TELEPHONE (OUTPATIENT)
Age: 58
End: 2023-10-16

## 2023-10-16 ENCOUNTER — OFFICE VISIT (OUTPATIENT)
Dept: FAMILY MEDICINE CLINIC | Facility: CLINIC | Age: 58
End: 2023-10-16
Payer: COMMERCIAL

## 2023-10-16 VITALS
WEIGHT: 189 LBS | HEIGHT: 67 IN | RESPIRATION RATE: 18 BRPM | TEMPERATURE: 97.4 F | BODY MASS INDEX: 29.66 KG/M2 | OXYGEN SATURATION: 94 % | HEART RATE: 56 BPM | DIASTOLIC BLOOD PRESSURE: 70 MMHG | SYSTOLIC BLOOD PRESSURE: 122 MMHG

## 2023-10-16 DIAGNOSIS — E11.9 TYPE 2 DIABETES MELLITUS WITHOUT COMPLICATION, WITH LONG-TERM CURRENT USE OF INSULIN (HCC): ICD-10-CM

## 2023-10-16 DIAGNOSIS — E78.2 MIXED HYPERLIPIDEMIA: Chronic | ICD-10-CM

## 2023-10-16 DIAGNOSIS — I42.0 ISCHEMIC CONGESTIVE CARDIOMYOPATHY (HCC): ICD-10-CM

## 2023-10-16 DIAGNOSIS — Z79.4 TYPE 2 DIABETES MELLITUS WITHOUT COMPLICATION, WITH LONG-TERM CURRENT USE OF INSULIN (HCC): Primary | ICD-10-CM

## 2023-10-16 DIAGNOSIS — I25.5 ISCHEMIC CONGESTIVE CARDIOMYOPATHY (HCC): ICD-10-CM

## 2023-10-16 DIAGNOSIS — E11.51 DIABETES MELLITUS WITH PERIPHERAL CIRCULATORY DISORDER, CONTROLLED (HCC): Primary | ICD-10-CM

## 2023-10-16 DIAGNOSIS — E11.9 TYPE 2 DIABETES MELLITUS WITHOUT COMPLICATION, WITH LONG-TERM CURRENT USE OF INSULIN (HCC): Primary | ICD-10-CM

## 2023-10-16 DIAGNOSIS — Z79.4 TYPE 2 DIABETES MELLITUS WITHOUT COMPLICATION, WITH LONG-TERM CURRENT USE OF INSULIN (HCC): ICD-10-CM

## 2023-10-16 DIAGNOSIS — Z23 ENCOUNTER FOR IMMUNIZATION: ICD-10-CM

## 2023-10-16 DIAGNOSIS — I10 BENIGN ESSENTIAL HYPERTENSION: Chronic | ICD-10-CM

## 2023-10-16 PROBLEM — R79.89 ELEVATED TROPONIN: Status: RESOLVED | Noted: 2022-02-23 | Resolved: 2023-10-16

## 2023-10-16 PROBLEM — R14.0 ABDOMINAL DISTENSION: Status: RESOLVED | Noted: 2023-02-07 | Resolved: 2023-10-16

## 2023-10-16 LAB — SL AMB POCT HEMOGLOBIN AIC: 10.4 (ref ?–6.5)

## 2023-10-16 PROCEDURE — 99214 OFFICE O/P EST MOD 30 MIN: CPT | Performed by: INTERNAL MEDICINE

## 2023-10-16 PROCEDURE — 90686 IIV4 VACC NO PRSV 0.5 ML IM: CPT

## 2023-10-16 PROCEDURE — 83036 HEMOGLOBIN GLYCOSYLATED A1C: CPT | Performed by: INTERNAL MEDICINE

## 2023-10-16 PROCEDURE — 90471 IMMUNIZATION ADMIN: CPT

## 2023-10-16 RX ORDER — INSULIN LISPRO 100 [IU]/ML
5 INJECTION, SOLUTION INTRAVENOUS; SUBCUTANEOUS 2 TIMES DAILY WITH MEALS
Qty: 5 ML | Refills: 5 | Status: SHIPPED | OUTPATIENT
Start: 2023-10-16 | End: 2023-10-18

## 2023-10-16 NOTE — PROGRESS NOTES
Name: Niki Oh      : 1965      MRN: [id number]  Encounter Provider: Josh Montiel MD  Encounter Date: 10/16/2023   Encounter department: [address] [facility]     1. Diabetes mellitus with peripheral circulatory disorder, controlled [facility])  Assessment & Plan:    Lab Results   Component Value Date    HGBA1C 10.4 (A) 10/16/2023     Not well controlled. The only thing that has changed per patient is his schedule and working nights. He will look into changing his schedule back to days. Discussed medication changes, and he will also start mealtime insulin BID with meals. He was asked to call with glucose readings in the next 2 weeks. Orders:  -     POCT hemoglobin A1c  -     Hemoglobin A1C; Future; Expected date: 2024  -     Comprehensive metabolic panel; Future; Expected date: 2024  -     CBC and Platelet; Future; Expected date: 2024  -     Albumin / creatinine urine ratio; Future; Expected date: 2024  -     Lipid Panel with Direct LDL reflex; Future; Expected date: 2024  -     Insulin Lispro (HumaLOG) 100 units/mL cartridge for injection; Inject 5 Units under the skin 2 (two) times a day with meals  -     Hemoglobin A1C  -     Comprehensive metabolic panel  -     CBC and Platelet  -     Albumin / creatinine urine ratio  -     Lipid Panel with Direct LDL reflex    2. Type 2 diabetes mellitus without complication, with long-term current use of insulin ([address])  Assessment & Plan:  Treatment as above. Lab Results   Component Value Date    HGBA1C 10.4 (A) 10/16/2023       Orders:  -     Hemoglobin A1C; Future; Expected date: 2024  -     Comprehensive metabolic panel; Future; Expected date: 2024  -     CBC and Platelet; Future; Expected date: 2024  -     Albumin / creatinine urine ratio; Future; Expected date: 2024  -     Lipid Panel with Direct LDL reflex;  Future; Expected date: 2024  -     Insulin Lispro (HumaLOG) 100 units/mL cartridge for injection; Inject 5 Units under the skin 2 (two) times a day with meals  -     Hemoglobin A1C  -     Comprehensive metabolic panel  -     CBC and Platelet  -     Albumin / creatinine urine ratio  -     Lipid Panel with Direct LDL reflex    3. Benign essential hypertension  Assessment & Plan:  Well controlled on current medications and will continue as ordered. 4. Ischemic congestive cardiomyopathy [facility])  Assessment & Plan:  He continues to follow with cardiology, who manages this problem. 5. Mixed hyperlipidemia  Assessment & Plan: Will check labs for lipids and LFT's. Continue rosuvastatin as ordered. 6. Encounter for immunization  -     influenza vaccine, quadrivalent, 0.5 mL, preservative-free, for adult and pediatric patients 6 mos+ (AFLURIA, FLUARIX, FLULAVAL, FLUZONE)      Depression Screening and Follow-up Plan: Patient was screened for depression during today's encounter. They screened negative with a PHQ-2 score of 0. Subjective     Here for follow up visit for DM. He feels well but has changed his work schedule and has been working nights. He states his diet and activity level have not changed. Continues to follow with his cardiologist and denies chest pain or dyspnea. There are no hypoglycemic episodes. He is following with someone for his prosthetic and denies issues there. Review of Systems   Constitutional: Negative. Respiratory: Negative. Cardiovascular: Negative. Endocrine: Negative.         Past Medical History:   Diagnosis Date   • Diabetes mellitus ([address])    • GERD (gastroesophageal reflux disease)    • Hyperlipidemia    • Hypertension      Past Surgical History:   Procedure Laterality Date   • ARTERIAL BYPASS SURGERY     • BELOW KNEE LEG AMPUTATION Left    • IR THORACENTESIS  02/23/2022     Family History   Problem Relation Age of Onset   • No Known Problems Mother      Social History     Socioeconomic History   • Marital status: /Civil [employer]     Spouse name: None   • Number of children: None   • Years of education: None   • Highest education level: None   Occupational History   • None   Tobacco Use   • Smoking status: Former     Packs/day: 1.00     Types: Cigarettes     Start date: 34     Quit date:      Years since quittin.8   • Smokeless tobacco: Never   Vaping Use   • Vaping Use: Never used   Substance and Sexual Activity   • Alcohol use: Not Currently   • Drug use: Never   • Sexual activity: None   Other Topics Concern   • None   Social History Narrative   • None     Social Determinants of Health     Financial Resource Strain: Not on file   Food Insecurity: No Food Insecurity (2022)    Hunger Vital Sign    • Worried About Running Out of Food in the Last Year: Never true    • Ran Out of Food in the Last Year: Never true   Transportation Needs: No Transportation Needs (2022)    PRAPARE - Transportation    • Lack of Transportation (Medical): No    • Lack of Transportation (Non-Medical):  No   Physical Activity: Not on file   Stress: Not on file   Social Connections: Not on file   Intimate Partner Violence: Not on file   Housing Stability: Unknown (2022)    Housing Stability Vital Sign    • Unable to Pay for Housing in the Last Year: No    • Number of Places Lived in the Last Year: Not on file    • Unstable Housing in the Last Year: No     Current Outpatient Medications on File Prior to Visit   Medication Sig   • amiodarone 200 mg tablet TAKE 1/2 TABLET BY MOUTH EVERY DAY   • aspirin (ECOTRIN LOW STRENGTH) 81 mg EC tablet Take 1 tablet (81 mg total) by mouth daily   • Basaglar KwikPen 100 units/mL SOPN INJECT 68 UNITS UNDER THE SKIN DAILY AT BEDTIME   • carvedilol (COREG) 12.5 mg tablet Take 12.5 mg by mouth 2 (two) times a day with meals   • Empagliflozin (JARDIANCE) 10 MG TABS tablet Take 10 mg by mouth every morning   • furosemide (LASIX) 40 mg tablet TAKE 1 TABLET BY MOUTH EVERY DAY   • pantoprazole (PROTONIX) 40 mg tablet Take 1 tablet (40 mg total) by mouth daily   • rosuvastatin (CRESTOR) 20 MG tablet Take 20 mg by mouth daily   • spironolactone (ALDACTONE) 25 mg tablet Take 25 mg by mouth daily   • valsartan (DIOVAN) 40 mg tablet Take 40 mg by mouth daily   • warfarin (COUMADIN) 2.5 mg tablet Take 1 tablet (2.5 mg total) by mouth daily (Patient taking differently: Take 2.5 mg by mouth daily 2 days / week)   • warfarin (Coumadin) 5 mg tablet Take one tablet by mouth daily or as directed by provider (Patient taking differently: 5 days /week)     Allergies   Allergen Reactions   • Bee Venom Anaphylaxis     Reaction Date: [id number];    • Penicillins Rash     Reaction Date: [id number];      Immunization History   Administered Date(s) Administered   • Influenza Quadrivalent Preservative Free 3 years and older IM 10/09/2014   • Influenza Quadrivalent, 6-35 Months IM 10/01/2016   • Influenza Split 10/01/2014   • Influenza, injectable, quadrivalent, preservative free 0.5 mL 10/16/2023   • Influenza, recombinant, quadrivalent,injectable, preservative free 12/10/2018, 11/18/2021, 01/19/2023   • Pneumococcal Polysaccharide PPV23 1965       Objective     /70   Pulse 56   Temp (!) 97.4 °F (36.3 °C)   Resp 18   Ht 5' 7" (1.702 m)   Wt 85.7 kg (189 lb)   SpO2 94%   BMI 29.60 kg/m²     Physical Exam  Constitutional:       Appearance: Normal appearance. He is well-developed. Eyes:      Conjunctiva/sclera: Conjunctivae normal.   Neck:      Thyroid: No thyromegaly. Vascular: No JVD. Cardiovascular:      Rate and Rhythm: Normal rate and regular rhythm. Heart sounds: Normal heart sounds. No murmur heard. No friction rub. No gallop. Pulmonary:      Effort: Pulmonary effort is normal.      Breath sounds: Normal breath sounds. No wheezing or rales. Abdominal:      General: Bowel sounds are normal. There is no distension. Palpations: Abdomen is soft. Tenderness:  There is no abdominal tenderness. Musculoskeletal:      Cervical back: Neck supple. Neurological:      Mental Status: He is alert.        Rambo Vasquez MD

## 2023-10-16 NOTE — ASSESSMENT & PLAN NOTE
Lab Results   Component Value Date    HGBA1C 10.4 (A) 10/16/2023     Not well controlled. The only thing that has changed per patient is his schedule and working nights. He will look into changing his schedule back to days. Discussed medication changes, and he will also start mealtime insulin BID with meals. He was asked to call with glucose readings in the next 2 weeks.

## 2023-10-18 ENCOUNTER — TELEPHONE (OUTPATIENT)
Age: 58
End: 2023-10-18

## 2023-10-18 DIAGNOSIS — E11.9 TYPE 2 DIABETES MELLITUS WITHOUT COMPLICATION, WITH LONG-TERM CURRENT USE OF INSULIN (HCC): Primary | ICD-10-CM

## 2023-10-18 DIAGNOSIS — E11.51 DIABETES MELLITUS WITH PERIPHERAL CIRCULATORY DISORDER, CONTROLLED (HCC): Primary | ICD-10-CM

## 2023-10-18 DIAGNOSIS — Z79.4 TYPE 2 DIABETES MELLITUS WITHOUT COMPLICATION, WITH LONG-TERM CURRENT USE OF INSULIN (HCC): Primary | ICD-10-CM

## 2023-10-18 RX ORDER — BLOOD SUGAR DIAGNOSTIC
STRIP MISCELLANEOUS 2 TIMES DAILY
Qty: 180 EACH | Refills: 3 | Status: SHIPPED | OUTPATIENT
Start: 2023-10-18

## 2023-10-18 RX ORDER — INSULIN ASPART 100 [IU]/ML
5 INJECTION, SOLUTION INTRAVENOUS; SUBCUTANEOUS 2 TIMES DAILY WITH MEALS
Qty: 15 ML | Refills: 3 | Status: SHIPPED | OUTPATIENT
Start: 2023-10-18

## 2023-10-18 NOTE — TELEPHONE ENCOUNTER
Insulin Pen [place] sent to pharmacy need to be changed to Available Formulary Alternative: BD Ultra-Fine Pen Needles and Syringes

## 2023-10-18 NOTE — TELEPHONE ENCOUNTER
Novolog Kwik pens do not need a PA at this time.   Needles  BD mini pen needles 5mm x 31  Dr Aydee Etienne aware of all new orders  [facility]

## 2023-10-26 ENCOUNTER — TELEPHONE (OUTPATIENT)
Age: 58
End: 2023-10-26

## 2023-10-26 NOTE — TELEPHONE ENCOUNTER
Cardiology Cardiology ENT ENT Endocrinology Gastroenterology Infectious Disease Internal Medicine Pulmonology Rx is in clerical inbasket. Thoracic Surgery

## 2023-10-26 NOTE — TELEPHONE ENCOUNTER
Blair from the [facility] calling, they need a written script faxed for the patient to have a whole prosthetic. There is a script scanned into media on 10/2/23 and the same diagnosis code can be used on new script.   Script just has to say " New Prothesis" and the Dx: L BKA DM2    Please fax to [facility] at [phone]

## 2023-11-09 ENCOUNTER — TELEPHONE (OUTPATIENT)
Age: 58
End: 2023-11-09

## 2023-11-09 NOTE — TELEPHONE ENCOUNTER
[facility]: [address] [facility] was calling to get a script for the pt's definitive prosthesis. Pt is going into today to get casted and measured for his prosthetic and they need a script faxed over to them.  Their fax number is [phone]

## 2023-12-02 LAB
ALBUMIN SERPL-MCNC: 3.9 G/DL (ref 3.8–4.9)
ALBUMIN/CREAT UR: 101 MG/G CREAT (ref 0–29)
ALBUMIN/GLOB SERPL: 1.8 {RATIO} (ref 1.2–2.2)
ALP SERPL-CCNC: 69 IU/L (ref 44–121)
ALT SERPL-CCNC: 19 IU/L (ref 0–44)
AST SERPL-CCNC: 23 IU/L (ref 0–40)
BILIRUB SERPL-MCNC: 0.2 MG/DL (ref 0–1.2)
BUN SERPL-MCNC: 31 MG/DL (ref 8–27)
BUN/CREAT SERPL: 21 (ref 10–24)
CALCIUM SERPL-MCNC: 8.2 MG/DL (ref 8.6–10.2)
CHLORIDE SERPL-SCNC: 102 MMOL/L (ref 96–106)
CHOLEST SERPL-MCNC: 149 MG/DL (ref 100–199)
CO2 SERPL-SCNC: 22 MMOL/L (ref 20–29)
CREAT SERPL-MCNC: 1.49 MG/DL (ref 0.76–1.27)
CREAT UR-MCNC: 242.1 MG/DL
EGFR: 53 ML/MIN/1.73
ERYTHROCYTE [DISTWIDTH] IN BLOOD BY AUTOMATED COUNT: 12.4 % (ref 11.6–15.4)
EST. AVERAGE GLUCOSE BLD GHB EST-MCNC: 258 MG/DL
GLOBULIN SER-MCNC: 2.2 G/DL (ref 1.5–4.5)
GLUCOSE SERPL-MCNC: 167 MG/DL (ref 70–99)
HBA1C MFR BLD: 10.6 % (ref 4.8–5.6)
HCT VFR BLD AUTO: 34.5 % (ref 37.5–51)
HDLC SERPL-MCNC: 37 MG/DL
HGB BLD-MCNC: 11.5 G/DL (ref 13–17.7)
LDLC SERPL CALC-MCNC: 68 MG/DL (ref 0–99)
LDLC/HDLC SERPL: 1.8 RATIO (ref 0–3.6)
MCH RBC QN AUTO: 31.1 PG (ref 26.6–33)
MCHC RBC AUTO-ENTMCNC: 33.3 G/DL (ref 31.5–35.7)
MCV RBC AUTO: 93 FL (ref 79–97)
MICROALBUMIN UR-MCNC: 243.5 UG/ML
MICRODELETION SYND BLD/T FISH: NORMAL
MICRODELETION SYND BLD/T FISH: NORMAL
PLATELET # BLD AUTO: 162 X10E3/UL (ref 150–450)
POTASSIUM SERPL-SCNC: 4.6 MMOL/L (ref 3.5–5.2)
PROT SERPL-MCNC: 6.1 G/DL (ref 6–8.5)
RBC # BLD AUTO: 3.7 X10E6/UL (ref 4.14–5.8)
SL AMB VLDL CHOLESTEROL CALC: 44 MG/DL (ref 5–40)
SODIUM SERPL-SCNC: 139 MMOL/L (ref 134–144)
TRIGL SERPL-MCNC: 271 MG/DL (ref 0–149)
WBC # BLD AUTO: 3.8 X10E3/UL (ref 3.4–10.8)

## 2023-12-05 ENCOUNTER — TELEPHONE (OUTPATIENT)
Age: 58
End: 2023-12-05

## 2023-12-05 NOTE — TELEPHONE ENCOUNTER
[facility] called. They sent a fax in regards to prosthesis rx. Insurance requires office notes, that state the need for the prosthesis. (The fax that she sent includes all the information that needs to be in the office visit). They need the office note addendum. They received fax however the fax was just the entire fax that was sent to the office, it did not include the documentation they need. Tried calling office, spoke with clerical and she stated the fax was sent this morning. Nothing in chart yet so I cannot see the fax that was sent to verify info. Susana said it does not include the information. If someone can call Maia directly, she would be happy to discuss what is needed.

## 2023-12-06 DIAGNOSIS — E11.51 DIABETES MELLITUS WITH PERIPHERAL CIRCULATORY DISORDER, CONTROLLED (HCC): ICD-10-CM

## 2023-12-07 RX ORDER — INSULIN GLARGINE 100 [IU]/ML
68 INJECTION, SOLUTION SUBCUTANEOUS
Qty: 15 ML | Refills: 5 | Status: SHIPPED | OUTPATIENT
Start: 2023-12-07

## 2023-12-12 ENCOUNTER — TELEPHONE (OUTPATIENT)
Age: 58
End: 2023-12-12

## 2023-12-12 NOTE — TELEPHONE ENCOUNTER
[facility] called. They received paperwork with addendum but it is not what the insurance wants. They sent a paper with a cover sheet with exactly the info that they need to be in the note in order for the insurance to cover the prosthesis. If it does not include all the info on the cover sheet the insurance will not cover it.       Fax [phone]

## 2024-01-22 ENCOUNTER — TELEPHONE (OUTPATIENT)
Dept: ADMINISTRATIVE | Facility: OTHER | Age: 59
End: 2024-01-22

## 2024-01-22 ENCOUNTER — OFFICE VISIT (OUTPATIENT)
Dept: FAMILY MEDICINE CLINIC | Facility: CLINIC | Age: 59
End: 2024-01-22
Payer: COMMERCIAL

## 2024-01-22 VITALS
HEIGHT: 67 IN | DIASTOLIC BLOOD PRESSURE: 80 MMHG | SYSTOLIC BLOOD PRESSURE: 130 MMHG | HEART RATE: 72 BPM | BODY MASS INDEX: 30.92 KG/M2 | WEIGHT: 197 LBS | TEMPERATURE: 98.3 F | RESPIRATION RATE: 18 BRPM | OXYGEN SATURATION: 94 %

## 2024-01-22 DIAGNOSIS — I48.0 PAROXYSMAL ATRIAL FIBRILLATION (HCC): ICD-10-CM

## 2024-01-22 DIAGNOSIS — E78.2 MIXED HYPERLIPIDEMIA: Chronic | ICD-10-CM

## 2024-01-22 DIAGNOSIS — E11.51 DIABETES MELLITUS WITH PERIPHERAL CIRCULATORY DISORDER, CONTROLLED (HCC): ICD-10-CM

## 2024-01-22 DIAGNOSIS — E11.319 TYPE 2 DIABETES MELLITUS WITH RETINOPATHY OF LEFT EYE, WITH LONG-TERM CURRENT USE OF INSULIN, MACULAR EDEMA PRESENCE UNSPECIFIED, UNSPECIFIED RETINOPATHY SEVERITY (HCC): ICD-10-CM

## 2024-01-22 DIAGNOSIS — I42.0 ISCHEMIC CONGESTIVE CARDIOMYOPATHY (HCC): ICD-10-CM

## 2024-01-22 DIAGNOSIS — E11.51 TYPE 2 DIABETES MELLITUS WITH DIABETIC PERIPHERAL ANGIOPATHY WITHOUT GANGRENE, WITH LONG-TERM CURRENT USE OF INSULIN (HCC): Primary | ICD-10-CM

## 2024-01-22 DIAGNOSIS — I25.5 ISCHEMIC CONGESTIVE CARDIOMYOPATHY (HCC): ICD-10-CM

## 2024-01-22 DIAGNOSIS — Z79.4 TYPE 2 DIABETES MELLITUS WITH DIABETIC PERIPHERAL ANGIOPATHY WITHOUT GANGRENE, WITH LONG-TERM CURRENT USE OF INSULIN (HCC): Primary | ICD-10-CM

## 2024-01-22 DIAGNOSIS — Z79.4 TYPE 2 DIABETES MELLITUS WITH RETINOPATHY OF LEFT EYE, WITH LONG-TERM CURRENT USE OF INSULIN, MACULAR EDEMA PRESENCE UNSPECIFIED, UNSPECIFIED RETINOPATHY SEVERITY (HCC): ICD-10-CM

## 2024-01-22 DIAGNOSIS — I10 BENIGN ESSENTIAL HYPERTENSION: Chronic | ICD-10-CM

## 2024-01-22 PROBLEM — E11.39 TYPE 2 DIABETES MELLITUS WITH OPHTHALMIC COMPLICATION, WITH LONG-TERM CURRENT USE OF INSULIN (HCC): Status: ACTIVE | Noted: 2022-02-23

## 2024-01-22 PROCEDURE — 99214 OFFICE O/P EST MOD 30 MIN: CPT | Performed by: INTERNAL MEDICINE

## 2024-01-22 RX ORDER — INSULIN GLARGINE 100 [IU]/ML
68 INJECTION, SOLUTION SUBCUTANEOUS DAILY
Qty: 20 ML | Refills: 3 | Status: SHIPPED | OUTPATIENT
Start: 2024-01-22

## 2024-01-22 RX ORDER — PROCHLORPERAZINE 25 MG/1
SUPPOSITORY RECTAL
Qty: 9 EACH | Refills: 3 | Status: SHIPPED | OUTPATIENT
Start: 2024-01-22

## 2024-01-22 RX ORDER — PROCHLORPERAZINE 25 MG/1
SUPPOSITORY RECTAL
Qty: 1 EACH | Refills: 0 | Status: SHIPPED | OUTPATIENT
Start: 2024-01-22

## 2024-01-22 NOTE — ASSESSMENT & PLAN NOTE
Lab Results   Component Value Date    HGBA1C 10.6 (H) 12/01/2023     He continues to follow with his ophthalmology specialist, we discussed how important it is for good glucose control to prevent further complications.  He will try to restart mealtime insulin.  Will follow up after labs in 3 months.

## 2024-01-22 NOTE — ASSESSMENT & PLAN NOTE
Managed by cardiology and heart rate is currently regular.  Continue warfarin per their instructions.

## 2024-01-22 NOTE — TELEPHONE ENCOUNTER
----- Message from Karmen Stern MD sent at 1/22/2024  8:41 AM EST -----  01/22/24 8:41 AM    Hello, our patient No patient name on file. has had Diabetic Eye Exam completed/performed. Please assist in updating the patient chart by making an External outreach to Dr. Lakhani  facility located in [place]. The date of service is 2023.    Thank you,  Karmen Stern  [facility]

## 2024-01-22 NOTE — LETTER
Diabetic Eye Exam Form    Date Requested: 24  Patient: Yoav Talbert  Patient : 1965   Referring Provider: Karmen Stern MD      DIABETIC Eye Exam Date _______________________________      Type of Exam MUST be documented for Diabetic Eye Exams. Please CHECK ONE.     Retinal Exam       Dilated Retinal Exam       OCT       Optomap-Iris Exam      Fundus Photography       Left Eye - Please check Retinopathy or No Retinopathy        Exam did show retinopathy    Exam did not show retinopathy       Right Eye - Please check Retinopathy or No Retinopathy       Exam did show retinopathy    Exam did not show retinopathy       Comments __________________________________________________________    Practice Providing Exam ______________________________________________    Exam Performed By (print name) _______________________________________      Provider Signature ___________________________________________________      These reports are needed for  compliance.  Please fax this completed form and a copy of the Diabetic Eye Exam report to our office located at [address] [place] [address] as soon as possible via Fax [phone] cathy Cota: Phone [phone]  We thank you for your assistance in treating our mutual patient.

## 2024-01-22 NOTE — ASSESSMENT & PLAN NOTE
Lab Results   Component Value Date    HGBA1C 10.6 (H) 12/01/2023     Treatment as above.  Encouraged dietary changes as well.

## 2024-01-22 NOTE — PROGRESS NOTES
Name: Yoav Talbert      : 1965      MRN: [id number]  Encounter Provider: Karmen Stern MD  Encounter Date: 2024   Encounter department: [facility]    Assessment & Plan     1. Type 2 diabetes mellitus with diabetic peripheral angiopathy without gangrene, with long-term current use of insulin ([facility])  Assessment & Plan:    Lab Results   Component Value Date    HGBA1C 10.6 (H) 2023     He continues to follow with his ophthalmology specialist, we discussed how important it is for good glucose control to prevent further complications.  He will try to restart mealtime insulin.  Will follow up after labs in 3 months.      Orders:  -     Hemoglobin A1C; Future; Expected date: 2024  -     Comprehensive metabolic panel; Future; Expected date: 2024  -     CBC and Platelet; Future; Expected date: 2024  -     Albumin / creatinine urine ratio; Future; Expected date: 2024  -     Lipid Panel with Direct LDL reflex; Future; Expected date: 2024  -     Hemoglobin A1C  -     Comprehensive metabolic panel  -     CBC and Platelet  -     Albumin / creatinine urine ratio  -     Lipid Panel with Direct LDL reflex    2. Diabetes mellitus with peripheral circulatory disorder, controlled (HCC)  Assessment & Plan:    Lab Results   Component Value Date    HGBA1C 10.6 (H) 2023     Treatment as above.  Encouraged dietary changes as well.     Orders:  -     Insulin Glargine Solostar (Lantus SoloStar) 100 UNIT/ML SOPN; Inject 0.68 mL (68 Units total) under the skin in the morning  -     Continuous Blood Gluc  (Dexcom G6 ) ELIZA; 1 DEXCOM G6  FOR CONTINUOUS GLUCOSE MONITORING  -     Continuous Blood Gluc Sensor (Dexcom G6 Sensor) MISC; 3 PACK SENSOR FOR CONTINUOUS GLUCOSE MONITORING  -     Hemoglobin A1C; Future; Expected date: 2024  -     Comprehensive metabolic panel; Future; Expected date: 2024  -     CBC and Platelet; Future; Expected date:  "04/22/2024  -     Albumin / creatinine urine ratio; Future; Expected date: 04/22/2024  -     Lipid Panel with Direct LDL reflex; Future; Expected date: 04/22/2024  -     Hemoglobin A1C  -     Comprehensive metabolic panel  -     CBC and Platelet  -     Albumin / creatinine urine ratio  -     Lipid Panel with Direct LDL reflex    3. Ischemic congestive cardiomyopathy (HCC)  Assessment & Plan:  Managed by cardiology.      4. Paroxysmal atrial fibrillation (HCC)  Assessment & Plan:  Managed by cardiology and heart rate is currently regular.  Continue warfarin per their instructions.      5. Benign essential hypertension  Assessment & Plan:  Well controlled on current medications and will continue as ordered.       6. Mixed hyperlipidemia  Assessment & Plan:  Reviewed labs and lipids are acceptable on rosuvastatin, will continue.      7. 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  He will try to restart mealtime insulin.  Will follow up after labs in 3 months.               Subjective     Here for follow up of DM and other issues. He has not been taking his mealtime insulin.  He says he tried a few times and it made him \"feel funny,\" so he stopped.  He feels that the basalglar does not work as well as the lantus, and now his insurance states they will cover lantus, he wants to switch back.  He continues to see the retinal specialist and is having injections for his diabetic retinopathy.  Denies chest pain or dyspnea; continues to follow with cardiologist.       Review of Systems   Constitutional: Negative.    Respiratory: Negative.     Cardiovascular: " Negative.    Endocrine: Negative.        Past Medical History:   Diagnosis Date   • Diabetes mellitus (HCC)    • GERD (gastroesophageal reflux disease)    • Hyperlipidemia    • Hypertension      Past Surgical History:   Procedure Laterality Date   • ARTERIAL BYPASS SURGERY     • BELOW KNEE LEG AMPUTATION Left    • IR THORACENTESIS  2022     Family History   Problem Relation Age of Onset   • No Known Problems Mother      Social History     Socioeconomic History   • Marital status: /Civil Union     Spouse name: None   • Number of children: None   • Years of education: None   • Highest education level: None   Occupational History   • None   Tobacco Use   • Smoking status: Former     Current packs/day: 0.00     Average packs/day: 1 pack/day for 23.0 years (23.0 ttl pk-yrs)     Types: Cigarettes     Start date:      Quit date:      Years since quittin.0   • Smokeless tobacco: Never   Vaping Use   • Vaping status: Never Used   Substance and Sexual Activity   • Alcohol use: Not Currently   • Drug use: Never   • Sexual activity: None   Other Topics Concern   • None   Social History Narrative   • None     Social Determinants of Health     Financial Resource Strain: Not on file   Food Insecurity: No Food Insecurity (2022)    Hunger Vital Sign    • Worried About Running Out of Food in the Last Year: Never true    • Ran Out of Food in the Last Year: Never true   Transportation Needs: No Transportation Needs (2022)    PRAPARE - Transportation    • Lack of Transportation (Medical): No    • Lack of Transportation (Non-Medical): No   Physical Activity: Not on file   Stress: Not on file   Social Connections: Not on file   Intimate Partner Violence: Not on file   Housing Stability: Unknown (2022)    Housing Stability Vital Sign    • Unable to Pay for Housing in the Last Year: No    • Number of Places Lived in the Last Year: Not on file    • Unstable Housing in the Last Year: No     Current  "Outpatient Medications on File Prior to Visit   Medication Sig   • amiodarone 200 mg tablet TAKE 1/2 TABLET BY MOUTH EVERY DAY   • aspirin (ECOTRIN LOW STRENGTH) 81 mg EC tablet Take 1 tablet (81 mg total) by mouth daily   • carvedilol (COREG) 12.5 mg tablet Take 12.5 mg by mouth 2 (two) times a day with meals   • Empagliflozin (JARDIANCE) 10 MG TABS tablet Take 10 mg by mouth every morning   • furosemide (LASIX) 40 mg tablet TAKE 1 TABLET BY MOUTH EVERY DAY   • insulin aspart (NovoLOG FlexPen) 100 UNIT/ML injection pen Inject 5 Units under the skin 2 (two) times a day with meals   • Insulin Pen Needle 31G X 5 MM MISC Use 2 (two) times a day   • Insulin Syringe-Needle U-100 (B-D INS SYR ULTRAFINE .3CC/31G) 31G X 5/16\" 0.3 ML MISC Use 2 (two) times a day   • rosuvastatin (CRESTOR) 20 MG tablet Take 20 mg by mouth daily   • spironolactone (ALDACTONE) 25 mg tablet Take 25 mg by mouth daily   • valsartan (DIOVAN) 40 mg tablet Take 40 mg by mouth daily   • warfarin (COUMADIN) 2.5 mg tablet Take 1 tablet (2.5 mg total) by mouth daily (Patient taking differently: Take 2.5 mg by mouth daily 1 days / week)   • warfarin (Coumadin) 5 mg tablet Take one tablet by mouth daily or as directed by provider (Patient taking differently: 6 days /week)   • [DISCONTINUED] Insulin Glargine Solostar (Basaglar KwikPen) 100 UNIT/ML SOPN INJECT 68 UNITS UNDER THE SKIN DAILY AT BEDTIME   • pantoprazole (PROTONIX) 40 mg tablet Take 1 tablet (40 mg total) by mouth daily (Patient not taking: Reported on 1/22/2024)     Allergies   Allergen Reactions   • Bee Venom Anaphylaxis     Reaction Date: 20Oct2005;    • Penicillins Rash     Reaction Date: 17Oct2005;      Immunization History   Administered Date(s) Administered   • Influenza Quadrivalent Preservative Free 3 years and older IM 10/09/2014   • Influenza Quadrivalent, 6-35 Months IM 10/01/2016   • Influenza Split 10/01/2014   • Influenza, injectable, quadrivalent, preservative free 0.5 mL " "10/16/2023   • Influenza, recombinant, quadrivalent,injectable, preservative free 12/10/2018, 11/18/2021, 01/19/2023   • Pneumococcal Polysaccharide PPV23 1965       Objective     /80   Pulse 72   Temp 98.3 °F (36.8 °C) (Temporal)   Resp 18   Ht 5' 7\" (1.702 m)   Wt 89.4 kg (197 lb)   SpO2 94%   BMI 30.85 kg/m²     Physical Exam  Constitutional:       Appearance: He is well-developed.   Eyes:      Conjunctiva/sclera: Conjunctivae normal.   Neck:      Thyroid: No thyromegaly.      Vascular: No JVD.   Cardiovascular:      Rate and Rhythm: Normal rate and regular rhythm.      Heart sounds: Normal heart sounds. No murmur heard.     No friction rub. No gallop.   Pulmonary:      Effort: Pulmonary effort is normal.      Breath sounds: Normal breath sounds. No wheezing or rales.   Abdominal:      General: Bowel sounds are normal. There is no distension.      Palpations: Abdomen is soft.      Tenderness: There is no abdominal tenderness.   Musculoskeletal:      Cervical back: Neck supple.       Karmen Stern MD    "

## 2024-01-23 NOTE — TELEPHONE ENCOUNTER
Upon review of the In Basket request and the patient's chart, initial outreach has been made via fax to facility. Please see Contacts section for details.     Thank you  Beata Allen

## 2024-01-31 NOTE — TELEPHONE ENCOUNTER
Upon review of the In Basket request we were able to locate, review, and update the patient chart as requested for Diabetic Eye Exam.    Any additional questions or concerns should be emailed to the Practice Liaisons via the appropriate education email address, please do not reply via In Basket.    Thank you  Beata Allen

## 2024-02-06 DIAGNOSIS — R14.0 ABDOMINAL BLOATING: ICD-10-CM

## 2024-02-06 RX ORDER — PANTOPRAZOLE SODIUM 40 MG/1
40 TABLET, DELAYED RELEASE ORAL DAILY
Qty: 90 TABLET | Refills: 1 | Status: SHIPPED | OUTPATIENT
Start: 2024-02-06

## 2024-03-26 LAB
LEFT EYE DIABETIC RETINOPATHY: NORMAL
RIGHT EYE DIABETIC RETINOPATHY: NORMAL

## 2024-04-20 DIAGNOSIS — E11.51 DIABETES MELLITUS WITH PERIPHERAL CIRCULATORY DISORDER, CONTROLLED (HCC): ICD-10-CM

## 2024-04-20 RX ORDER — INSULIN GLARGINE 100 [IU]/ML
INJECTION, SOLUTION SUBCUTANEOUS
Qty: 15 ML | Refills: 1 | Status: SHIPPED | OUTPATIENT
Start: 2024-04-20

## 2024-06-05 DIAGNOSIS — I50.22 CHRONIC SYSTOLIC CONGESTIVE HEART FAILURE (HCC): ICD-10-CM

## 2024-06-05 RX ORDER — FUROSEMIDE 40 MG/1
TABLET ORAL
Qty: 90 TABLET | Refills: 1 | Status: SHIPPED | OUTPATIENT
Start: 2024-06-05

## 2024-06-10 ENCOUNTER — TELEPHONE (OUTPATIENT)
Age: 59
End: 2024-06-10

## 2024-06-10 DIAGNOSIS — E11.51 DIABETES MELLITUS WITH PERIPHERAL CIRCULATORY DISORDER, CONTROLLED (HCC): ICD-10-CM

## 2024-06-10 RX ORDER — INSULIN GLARGINE 100 [IU]/ML
68 INJECTION, SOLUTION SUBCUTANEOUS DAILY
Qty: 15 ML | Refills: 0 | Status: SHIPPED | OUTPATIENT
Start: 2024-06-10

## 2024-06-11 ENCOUNTER — TELEPHONE (OUTPATIENT)
Age: 59
End: 2024-06-11

## 2024-06-19 NOTE — TELEPHONE ENCOUNTER
PA for INSULIN GLARGINE not required     Reason (screenshot if applicable)              Message sent to provider pool no

## 2024-07-04 DIAGNOSIS — E11.51 DIABETES MELLITUS WITH PERIPHERAL CIRCULATORY DISORDER, CONTROLLED (HCC): ICD-10-CM

## 2024-07-05 ENCOUNTER — TELEPHONE (OUTPATIENT)
Age: 59
End: 2024-07-05

## 2024-07-05 DIAGNOSIS — E11.51 DIABETES MELLITUS WITH PERIPHERAL CIRCULATORY DISORDER, CONTROLLED (HCC): ICD-10-CM

## 2024-07-05 RX ORDER — INSULIN GLARGINE 100 [IU]/ML
68 INJECTION, SOLUTION SUBCUTANEOUS DAILY
Qty: 15 ML | Refills: 0 | Status: CANCELLED | OUTPATIENT
Start: 2024-07-05

## 2024-07-05 RX ORDER — INSULIN GLARGINE 100 [IU]/ML
INJECTION, SOLUTION SUBCUTANEOUS
OUTPATIENT
Start: 2024-07-05

## 2024-07-05 RX ORDER — INSULIN GLARGINE 100 [IU]/ML
68 INJECTION, SOLUTION SUBCUTANEOUS DAILY
Qty: 60 ML | Refills: 0 | Status: SHIPPED | OUTPATIENT
Start: 2024-07-05

## 2024-07-05 NOTE — TELEPHONE ENCOUNTER
Pharmacy called requesting Status of refill for Lantus. Pharmacy made aware medication was refilled on 07/05/24 for 60ml with 0 refills to [facility] pharmacy. Upon looking further Pharmacy found the refill in their System.

## 2024-07-05 NOTE — TELEPHONE ENCOUNTER
Please call patient let him know that I sent the prescription to [facility] in [place] on [address].  Thank you,  Jemima Vasquez, DO

## 2024-07-05 NOTE — TELEPHONE ENCOUNTER
Patient called for follow up on his Lantus states is going to be out soon and does not want to run out. Please send refill to [facility] in [place] [facility]

## 2024-07-11 ENCOUNTER — OFFICE VISIT (OUTPATIENT)
Dept: FAMILY MEDICINE CLINIC | Facility: CLINIC | Age: 59
End: 2024-07-11
Payer: COMMERCIAL

## 2024-07-11 VITALS
HEART RATE: 64 BPM | HEIGHT: 67 IN | RESPIRATION RATE: 18 BRPM | DIASTOLIC BLOOD PRESSURE: 80 MMHG | SYSTOLIC BLOOD PRESSURE: 138 MMHG | TEMPERATURE: 98.6 F | WEIGHT: 197.8 LBS | BODY MASS INDEX: 31.04 KG/M2

## 2024-07-11 DIAGNOSIS — Z79.4 TYPE 2 DIABETES MELLITUS WITH RETINOPATHY OF LEFT EYE, WITH LONG-TERM CURRENT USE OF INSULIN, MACULAR EDEMA PRESENCE UNSPECIFIED, UNSPECIFIED RETINOPATHY SEVERITY (HCC): Primary | ICD-10-CM

## 2024-07-11 DIAGNOSIS — I10 BENIGN ESSENTIAL HYPERTENSION: Chronic | ICD-10-CM

## 2024-07-11 DIAGNOSIS — E11.319 TYPE 2 DIABETES MELLITUS WITH RETINOPATHY OF LEFT EYE, WITH LONG-TERM CURRENT USE OF INSULIN, MACULAR EDEMA PRESENCE UNSPECIFIED, UNSPECIFIED RETINOPATHY SEVERITY (HCC): Primary | ICD-10-CM

## 2024-07-11 DIAGNOSIS — E11.51 DIABETES MELLITUS WITH PERIPHERAL CIRCULATORY DISORDER, CONTROLLED (HCC): ICD-10-CM

## 2024-07-11 DIAGNOSIS — E78.2 MIXED HYPERLIPIDEMIA: Chronic | ICD-10-CM

## 2024-07-11 PROCEDURE — 99214 OFFICE O/P EST MOD 30 MIN: CPT | Performed by: INTERNAL MEDICINE

## 2024-07-11 NOTE — ASSESSMENT & PLAN NOTE
Lab Results   Component Value Date    HGBA1C 10.6 (H) 12/01/2023     As above, continue frequent eye follow up as well.

## 2024-07-11 NOTE — PROGRESS NOTES
Ambulatory Visit  Name: Yoav Talbert      : 1965      MRN: [id number]  Encounter Provider: Karmen Stern MD  Encounter Date: 2024   Encounter department: [facility]    Assessment & Plan   1. 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   Orders:  -     Hemoglobin A1C; Future; Expected date: 2024  -     Comprehensive metabolic panel; Future; Expected date: 2024  -     CBC and differential; Future; Expected date: 2024  -     Lipid Panel with Direct LDL reflex; Future; Expected date: 2024  -     Hemoglobin A1C  -     Comprehensive metabolic panel  -     CBC and differential  -     Lipid Panel with Direct LDL reflex  2. Mixed hyperlipidemia  Assessment & Plan:  Continue rosuvastatin as ordered and will check labs for LFT's and lipids.  Encouraged low fat diet.   Orders:  -     Hemoglobin A1C; Future; Expected date: 2024  -     Comprehensive metabolic panel; Future; Expected date: 2024  -     CBC and differential; Future; Expected date: 2024  -     Lipid Panel with Direct LDL reflex; Future; Expected date: 2024  -     Hemoglobin A1C  -     Comprehensive metabolic panel  -     CBC and differential  -     Lipid Panel with Direct LDL reflex  3. Benign essential hypertension  Assessment & Plan:  Well controlled on current medications and will continue as ordered.  Orders:  -     Hemoglobin A1C; Future; Expected date: 2024  -     Comprehensive metabolic panel; Future; Expected date: 2024  -     CBC and differential; Future; Expected date: 2024  -     Lipid Panel with Direct LDL reflex; Future; Expected date: 2024  -     Hemoglobin A1C  -     Comprehensive  metabolic panel  -     CBC and differential  -     Lipid Panel with Direct LDL reflex  4. Diabetes mellitus with peripheral circulatory disorder, controlled (HCC)  Assessment & Plan:    Lab Results   Component Value Date    HGBA1C 10.6 (H) 2023     This has not been well controlled.  He  has stopped drinking his sweet drinks.  Will recheck hemoglobin A1C.  Discussed need for better compliance with labwork and follow up.  Continue current medications as ordered.   Orders:  -     Hemoglobin A1C; Future; Expected date: 2024  -     Comprehensive metabolic panel; Future; Expected date: 2024  -     CBC and differential; Future; Expected date: 2024  -     Lipid Panel with Direct LDL reflex; Future; Expected date: 2024  -     Hemoglobin A1C  -     Comprehensive metabolic panel  -     CBC and differential  -     Lipid Panel with Direct LDL reflex       History of Present Illness     He had been drinking a lot of iced tea but stopped this about 3 weeks ago.    Seeing cardiology now at [facility].        Review of Systems   Constitutional: Negative.    Respiratory: Negative.     Cardiovascular: Negative.    Endocrine: Negative for polydipsia, polyphagia and polyuria.     Past Medical History:   Diagnosis Date   • Diabetes mellitus (HCC)    • GERD (gastroesophageal reflux disease)    • Hyperlipidemia    • Hypertension      Past Surgical History:   Procedure Laterality Date   • ARTERIAL BYPASS SURGERY     • BELOW KNEE LEG AMPUTATION Left    • IR THORACENTESIS  2022     Family History   Problem Relation Age of Onset   • No Known Problems Mother      Social History     Tobacco Use   • Smoking status: Former     Current packs/day: 0.00     Average packs/day: 1 pack/day for 23.0 years (23.0 ttl pk-yrs)     Types: Cigarettes     Start date:      Quit date:      Years since quittin.5   • Smokeless tobacco: Never   Vaping Use   • Vaping status: Never Used   Substance and Sexual  "Activity   • Alcohol use: Not Currently   • Drug use: Never   • Sexual activity: Not on file     Current Outpatient Medications on File Prior to Visit   Medication Sig   • amiodarone 200 mg tablet TAKE 1/2 TABLET BY MOUTH EVERY DAY   • aspirin (ECOTRIN LOW STRENGTH) 81 mg EC tablet Take 1 tablet (81 mg total) by mouth daily   • carvedilol (COREG) 12.5 mg tablet Take 12.5 mg by mouth 2 (two) times a day with meals   • Continuous Blood Gluc  (Dexcom G6 ) ELIZA 1 DEXCOM G6  FOR CONTINUOUS GLUCOSE MONITORING   • Continuous Blood Gluc Sensor (Dexcom G6 Sensor) MISC 3 PACK SENSOR FOR CONTINUOUS GLUCOSE MONITORING   • Empagliflozin (JARDIANCE) 10 MG TABS tablet Take 10 mg by mouth every morning   • furosemide (LASIX) 40 mg tablet TAKE 1 TABLET BY MOUTH EVERY DAY   • insulin aspart (NovoLOG FlexPen) 100 UNIT/ML injection pen Inject 5 Units under the skin 2 (two) times a day with meals   • Insulin Glargine Solostar (Lantus SoloStar) 100 UNIT/ML SOPN Inject 0.68 mL (68 Units total) under the skin in the morning   • Insulin Pen Needle 31G X 5 MM MISC Use 2 (two) times a day   • Insulin Syringe-Needle U-100 (B-D INS SYR ULTRAFINE .3CC/31G) 31G X 5/16\" 0.3 ML MISC Use 2 (two) times a day   • pantoprazole (PROTONIX) 40 mg tablet TAKE 1 TABLET BY MOUTH EVERY DAY   • rosuvastatin (CRESTOR) 20 MG tablet Take 20 mg by mouth daily   • spironolactone (ALDACTONE) 25 mg tablet Take 25 mg by mouth daily   • valsartan (DIOVAN) 40 mg tablet Take 40 mg by mouth daily   • warfarin (COUMADIN) 2.5 mg tablet Take 1 tablet (2.5 mg total) by mouth daily (Patient taking differently: Take 2.5 mg by mouth daily 1 days / week)   • warfarin (Coumadin) 5 mg tablet Take one tablet by mouth daily or as directed by provider (Patient taking differently: 6 days /week)     Allergies   Allergen Reactions   • Bee Venom Anaphylaxis     Reaction Date: 20Oct2005;    • Penicillins Rash     Reaction Date: 17Oct2005;      Immunization History " "  Administered Date(s) Administered   • Influenza Quadrivalent Preservative Free 3 years and older IM 10/09/2014   • Influenza Quadrivalent, 6-35 Months IM 10/01/2016   • Influenza Split 10/01/2014   • Influenza, injectable, quadrivalent, preservative free 0.5 mL 10/16/2023   • Influenza, recombinant, quadrivalent,injectable, preservative free 12/10/2018, 11/18/2021, 01/19/2023   • Pneumococcal Polysaccharide PPV23 1965     Objective     /80   Pulse 64   Temp 98.6 °F (37 °C)   Resp 18   Ht 5' 7\" (1.702 m)   Wt 89.7 kg (197 lb 12.8 oz)   BMI 30.98 kg/m²     Physical Exam  Constitutional:       Appearance: Normal appearance.   HENT:      Head: Normocephalic and atraumatic.      Mouth/Throat:      Mouth: Mucous membranes are moist.   Eyes:      Pupils: Pupils are equal, round, and reactive to light.   Cardiovascular:      Rate and Rhythm: Normal rate and regular rhythm.      Pulses: no weak pulses.           Dorsalis pedis pulses are 2+ on the right side and 2+ on the left side.      Heart sounds: No murmur heard.     No friction rub. No gallop.   Pulmonary:      Effort: Pulmonary effort is normal.      Breath sounds: Normal breath sounds. No wheezing, rhonchi or rales.   Abdominal:      General: Abdomen is flat. Bowel sounds are normal.      Palpations: Abdomen is soft.      Tenderness: There is no abdominal tenderness. There is no guarding.   Musculoskeletal:         General: No swelling.   Feet:      Right foot:      Skin integrity: No ulcer, skin breakdown, erythema, warmth, callus or dry skin.      Left foot: amputated  Neurological:      Mental Status: He is alert.     Patient's shoes and socks removed.    Right Foot/Ankle   Right Foot Inspection  Skin Exam: skin normal and skin intact. No dry skin, no warmth, no callus, no erythema, no maceration, no abnormal color, no pre-ulcer, no ulcer and no callus.     Toe Exam: ROM and strength within normal limits.     Sensory   Monofilament testing: " intact    Vascular  Capillary refills: < 3 seconds  The right DP pulse is 2+.     Left Foot/Ankle  Left Foot Inspection  Amputation: amputation left foot     Toe Exam: ROM and strength within normal limits.     Sensory   Monofilament testing: intact    Vascular  Capillary refills: < 3 seconds  The left DP pulse is 2+.     Assign Risk Category  No deformity present  No loss of protective sensation  No weak pulses  Risk: 0

## 2024-07-11 NOTE — ASSESSMENT & PLAN NOTE
Lab Results   Component Value Date    HGBA1C 10.6 (H) 12/01/2023     This has not been well controlled.  He  has stopped drinking his sweet drinks.  Will recheck hemoglobin A1C.  Discussed need for better compliance with labwork and follow up.  Continue current medications as ordered.

## 2024-07-11 NOTE — ASSESSMENT & PLAN NOTE
Continue rosuvastatin as ordered and will check labs for LFT's and lipids.  Encouraged low fat diet.

## 2024-07-12 ENCOUNTER — TELEPHONE (OUTPATIENT)
Dept: ADMINISTRATIVE | Facility: OTHER | Age: 59
End: 2024-07-12

## 2024-07-12 NOTE — LETTER
Diabetic Eye Exam Form    Date Requested: 07/15/24  Patient: Yoav Talbert  Patient : 1965   Referring Provider: Karmen Stern MD      DIABETIC Eye Exam Date _______________________________      Type of Exam MUST be documented for Diabetic Eye Exams. Please CHECK ONE.     Retinal Exam       Dilated Retinal Exam       OCT       Optomap-Iris Exam      Fundus Photography       Left Eye - Please check Retinopathy or No Retinopathy        Exam did show retinopathy    Exam did not show retinopathy       Right Eye - Please check Retinopathy or No Retinopathy       Exam did show retinopathy    Exam did not show retinopathy       Comments __________________________________________________________    Practice Providing Exam ______________________________________________    Exam Performed By (print name) _______________________________________      Provider Signature ___________________________________________________      These reports are needed for  compliance.  Please fax this completed form and a copy of the Diabetic Eye Exam report to our office located at [address] [place] [address] as soon as possible via Fax [phone] attention [place]: Phone [phone]  We thank you for your assistance in treating our mutual patient.    Dr. Lakhani - [phone]  [phone]

## 2024-07-12 NOTE — TELEPHONE ENCOUNTER
----- Message from Karmen Stern MD sent at 7/11/2024  4:00 PM EDT -----  07/11/24 4:00 PM    Hello, our patient No patient name on file. has had Diabetic Eye Exam completed/performed. Please assist in updating the patient chart by making an External outreach to Dr. Lakhani facility located in [place], [place]. The date of service is 2024.    Thank you,  Karmen Stern  [facility]

## 2024-07-12 NOTE — LETTER
Diabetic Eye Exam Form    Date Requested: 24  Patient: Yoav Talbert  Patient : 1965   Referring Provider: Karmen Stern MD      DIABETIC Eye Exam Date _______________________________      Type of Exam MUST be documented for Diabetic Eye Exams. Please CHECK ONE.     Retinal Exam       Dilated Retinal Exam       OCT       Optomap-Iris Exam      Fundus Photography       Left Eye - Please check Retinopathy or No Retinopathy        Exam did show retinopathy    Exam did not show retinopathy       Right Eye - Please check Retinopathy or No Retinopathy       Exam did show retinopathy    Exam did not show retinopathy       Comments __________________________________________________________    Practice Providing Exam ______________________________________________    Exam Performed By (print name) _______________________________________      Provider Signature ___________________________________________________      These reports are needed for  compliance.  Please fax this completed form and a copy of the Diabetic Eye Exam report to our office located at [address] [place] [address] as soon as possible via Fax [phone] attention [place]: Phone [phone]  We thank you for your assistance in treating our mutual patient.   Dr. Lakhani - [phone]  [phone]

## 2024-07-15 NOTE — TELEPHONE ENCOUNTER
Upon review of the In Basket request and the patient's chart, initial outreach has been made via fax to facility. Please see Contacts section for details.     Thank you  Anil Alfaro

## 2024-07-19 NOTE — TELEPHONE ENCOUNTER
As a follow-up, a second attempt has been made for outreach via fax to facility. Please see Contacts section for details.    Thank you  Anil Alfaro

## 2024-07-24 ENCOUNTER — APPOINTMENT (OUTPATIENT)
Dept: LAB | Age: 59
End: 2024-07-24

## 2024-07-24 ENCOUNTER — APPOINTMENT (OUTPATIENT)
Dept: RADIOLOGY | Age: 59
End: 2024-07-24

## 2024-07-24 ENCOUNTER — APPOINTMENT (OUTPATIENT)
Dept: URGENT CARE | Age: 59
End: 2024-07-24

## 2024-07-24 DIAGNOSIS — Z00.00 ROUTINE GENERAL MEDICAL EXAMINATION AT A HEALTH CARE FACILITY: ICD-10-CM

## 2024-07-24 LAB
BACTERIA UR QL AUTO: ABNORMAL /HPF
BASOPHILS # BLD AUTO: 0.05 THOUSANDS/ÂΜL (ref 0–0.1)
BASOPHILS NFR BLD AUTO: 1 % (ref 0–1)
BILIRUB UR QL STRIP: NEGATIVE
BUN SERPL-MCNC: 32 MG/DL (ref 5–25)
CLARITY UR: CLEAR
COLOR UR: ABNORMAL
CREAT SERPL-MCNC: 1.51 MG/DL (ref 0.6–1.3)
EOSINOPHIL # BLD AUTO: 0.3 THOUSAND/ÂΜL (ref 0–0.61)
EOSINOPHIL NFR BLD AUTO: 4 % (ref 0–6)
ERYTHROCYTE [DISTWIDTH] IN BLOOD BY AUTOMATED COUNT: 13.3 % (ref 11.6–15.1)
GFR SERPL CREATININE-BSD FRML MDRD: 49 ML/MIN/1.73SQ M
GLUCOSE UR STRIP-MCNC: ABNORMAL MG/DL
HCT VFR BLD AUTO: 37.5 % (ref 36.5–49.3)
HGB BLD-MCNC: 12.2 G/DL (ref 12–17)
HGB UR QL STRIP.AUTO: ABNORMAL
IMM GRANULOCYTES # BLD AUTO: 0.06 THOUSAND/UL (ref 0–0.2)
IMM GRANULOCYTES NFR BLD AUTO: 1 % (ref 0–2)
KETONES UR STRIP-MCNC: NEGATIVE MG/DL
LEUKOCYTE ESTERASE UR QL STRIP: NEGATIVE
LYMPHOCYTES # BLD AUTO: 1.44 THOUSANDS/ÂΜL (ref 0.6–4.47)
LYMPHOCYTES NFR BLD AUTO: 20 % (ref 14–44)
MCH RBC QN AUTO: 30.8 PG (ref 26.8–34.3)
MCHC RBC AUTO-ENTMCNC: 32.5 G/DL (ref 31.4–37.4)
MCV RBC AUTO: 95 FL (ref 82–98)
MONOCYTES # BLD AUTO: 0.64 THOUSAND/ÂΜL (ref 0.17–1.22)
MONOCYTES NFR BLD AUTO: 9 % (ref 4–12)
NEUTROPHILS # BLD AUTO: 4.69 THOUSANDS/ÂΜL (ref 1.85–7.62)
NEUTS SEG NFR BLD AUTO: 65 % (ref 43–75)
NITRITE UR QL STRIP: NEGATIVE
NON-SQ EPI CELLS URNS QL MICRO: ABNORMAL /HPF
NRBC BLD AUTO-RTO: 0 /100 WBCS
PH UR STRIP.AUTO: 6 [PH]
PLATELET # BLD AUTO: 185 THOUSANDS/UL (ref 149–390)
PMV BLD AUTO: 10.7 FL (ref 8.9–12.7)
PROT UR STRIP-MCNC: ABNORMAL MG/DL
RBC # BLD AUTO: 3.96 MILLION/UL (ref 3.88–5.62)
RBC #/AREA URNS AUTO: ABNORMAL /HPF
SP GR UR STRIP.AUTO: 1.01 (ref 1–1.03)
UROBILINOGEN UR STRIP-ACNC: <2 MG/DL
WBC # BLD AUTO: 7.18 THOUSAND/UL (ref 4.31–10.16)
WBC #/AREA URNS AUTO: ABNORMAL /HPF

## 2024-07-24 PROCEDURE — 83825 ASSAY OF MERCURY: CPT

## 2024-07-24 PROCEDURE — 84202 ASSAY RBC PROTOPORPHYRIN: CPT

## 2024-07-24 PROCEDURE — 85025 COMPLETE CBC W/AUTO DIFF WBC: CPT

## 2024-07-24 PROCEDURE — 82175 ASSAY OF ARSENIC: CPT

## 2024-07-24 PROCEDURE — 82300 ASSAY OF CADMIUM: CPT

## 2024-07-24 PROCEDURE — 86870 RBC ANTIBODY IDENTIFICATION: CPT

## 2024-07-24 PROCEDURE — 81001 URINALYSIS AUTO W/SCOPE: CPT

## 2024-07-24 PROCEDURE — 71045 X-RAY EXAM CHEST 1 VIEW: CPT

## 2024-07-24 PROCEDURE — 82570 ASSAY OF URINE CREATININE: CPT

## 2024-07-24 PROCEDURE — 83655 ASSAY OF LEAD: CPT

## 2024-07-24 PROCEDURE — 84520 ASSAY OF UREA NITROGEN: CPT

## 2024-07-24 PROCEDURE — 82232 ASSAY OF BETA-2 PROTEIN: CPT

## 2024-07-24 PROCEDURE — 82565 ASSAY OF CREATININE: CPT

## 2024-07-24 NOTE — TELEPHONE ENCOUNTER
Upon review of the In Basket request we were able to locate, review, and update the patient chart as requested for Diabetic Eye Exam.    Any additional questions or concerns should be emailed to the Practice Liaisons via the appropriate education email address, please do not reply via In Basket.    Thank you  Anil Alfaro   [facility]

## 2024-07-26 LAB
B2 MICROGLOB UR-MCNC: 1933 UG/L (ref 0–300)
LEAD BLD-MCNC: 1.3 UG/DL (ref 0–3.4)
ZPP RBC-MCNC: 24 UG/DL (ref 0–99)

## 2024-07-27 LAB — CADMIUM BLD-MCNC: <0.5 UG/L (ref 0–1.2)

## 2024-08-05 LAB
ARSENIC 24H UR-MCNC: 12 UG/L (ref 0–9)
ARSENIC, DMA: <5 UG/L
ARSENIC, INORGANIC: <10 UG/L
ARSENIC, MMA: <5 UG/L
ARSENIC, TOTAL TOXIC: <20 UG/L
CREAT UR-MCNC: 0.8 G/L (ref 0.3–3)
INORG ARSENIC/CREAT UR: 15 UG/G CREAT
LABORATORY COMMENT REPORT: NORMAL
LEAD 24H UR-MCNC: ABNORMAL UG/L (ref 0–49)
MERCURY 24H UR-MCNC: ABNORMAL UG/L (ref 0–19)

## 2024-08-08 ENCOUNTER — TELEPHONE (OUTPATIENT)
Dept: FAMILY MEDICINE CLINIC | Facility: CLINIC | Age: 59
End: 2024-08-08

## 2024-08-08 NOTE — TELEPHONE ENCOUNTER
----- Message from Sofiya Lebron DO sent at 8/7/2024  8:41 AM EDT -----  Regarding: Yoav Talbert occupational labwork  Good Morning Dr. Stern,    I just wanted to forward some labs that Mr. Yoav Talbert had through [facility] recently.  It looks like his renal function has stayed about the same but his Beta-2 Microglobulin, Urine was high @ 1933, although this is nonspecific for our purposes.  Toxic metal exposure panel was negative.      Thank you   Dr. Sofiya Lebron   [facility]  ----- Message -----  From: Dequan Robledo MD  Sent: 8/6/2024   2:40 PM EDT  To: Sofiya Lebron DO      ----- Message -----  From: Lab, Background User  Sent: 8/30/2023  12:04 PM EDT  To: Dequan Robledo MD

## 2024-08-08 NOTE — TELEPHONE ENCOUNTER
I called patient to let him know of mild anemia.  Likely source of any blood loss would be the GI tract and he needs to see GI for evaluation.  He should follow up with Located within [facility], who did his colonoscopy in the past.

## 2024-08-08 NOTE — TELEPHONE ENCOUNTER
Pt given the phone number for [facility] [phone]  Pt aware he has mild anemia and an evaluation needs to be done per Dr Stern  Please send [email or web address]

## 2024-09-28 DIAGNOSIS — E11.51 DIABETES MELLITUS WITH PERIPHERAL CIRCULATORY DISORDER, CONTROLLED (HCC): ICD-10-CM

## 2024-09-28 RX ORDER — INSULIN GLARGINE 100 [IU]/ML
INJECTION, SOLUTION SUBCUTANEOUS
Qty: 15 ML | Refills: 1 | Status: SHIPPED | OUTPATIENT
Start: 2024-09-28

## 2024-10-16 ENCOUNTER — OFFICE VISIT (OUTPATIENT)
Dept: FAMILY MEDICINE CLINIC | Facility: CLINIC | Age: 59
End: 2024-10-16
Payer: COMMERCIAL

## 2024-10-16 VITALS
HEART RATE: 58 BPM | HEIGHT: 67 IN | TEMPERATURE: 96 F | DIASTOLIC BLOOD PRESSURE: 86 MMHG | BODY MASS INDEX: 30.89 KG/M2 | WEIGHT: 196.8 LBS | SYSTOLIC BLOOD PRESSURE: 130 MMHG | RESPIRATION RATE: 16 BRPM

## 2024-10-16 DIAGNOSIS — E11.319 TYPE 2 DIABETES MELLITUS WITH RETINOPATHY OF LEFT EYE, WITH LONG-TERM CURRENT USE OF INSULIN, MACULAR EDEMA PRESENCE UNSPECIFIED, UNSPECIFIED RETINOPATHY SEVERITY (HCC): Primary | ICD-10-CM

## 2024-10-16 DIAGNOSIS — Z23 NEED FOR COVID-19 VACCINE: ICD-10-CM

## 2024-10-16 DIAGNOSIS — Z79.4 TYPE 2 DIABETES MELLITUS WITH RETINOPATHY OF LEFT EYE, WITH LONG-TERM CURRENT USE OF INSULIN, MACULAR EDEMA PRESENCE UNSPECIFIED, UNSPECIFIED RETINOPATHY SEVERITY (HCC): Primary | ICD-10-CM

## 2024-10-16 DIAGNOSIS — I42.0 ISCHEMIC CONGESTIVE CARDIOMYOPATHY (HCC): ICD-10-CM

## 2024-10-16 DIAGNOSIS — E78.2 MIXED HYPERLIPIDEMIA: Chronic | ICD-10-CM

## 2024-10-16 DIAGNOSIS — I10 BENIGN ESSENTIAL HYPERTENSION: Chronic | ICD-10-CM

## 2024-10-16 DIAGNOSIS — Z23 NEED FOR VACCINATION: ICD-10-CM

## 2024-10-16 DIAGNOSIS — I25.5 ISCHEMIC CONGESTIVE CARDIOMYOPATHY (HCC): ICD-10-CM

## 2024-10-16 PROCEDURE — 90471 IMMUNIZATION ADMIN: CPT

## 2024-10-16 PROCEDURE — 99214 OFFICE O/P EST MOD 30 MIN: CPT | Performed by: INTERNAL MEDICINE

## 2024-10-16 PROCEDURE — 90480 ADMN SARSCOV2 VAC 1/ONLY CMP: CPT

## 2024-10-16 PROCEDURE — 90673 RIV3 VACCINE NO PRESERV IM: CPT

## 2024-10-16 PROCEDURE — 91320 SARSCV2 VAC 30MCG TRS-SUC IM: CPT

## 2024-10-16 RX ORDER — PREDNISOLONE ACETATE 10 MG/ML
SUSPENSION/ DROPS OPHTHALMIC
COMMUNITY
Start: 2024-10-15

## 2024-10-16 NOTE — ASSESSMENT & PLAN NOTE
Has not been well controlled, will get labs to assess.  Will adjust medications as necessary after results are obtained.  Discussed need for good foot and eye care, exercise at least 150 minutes per week.   Lab Results   Component Value Date    HGBA1C 10.6 (H) 12/01/2023       Orders:    Hemoglobin A1C; Future    Comprehensive metabolic panel; Future    CBC and Platelet; Future    Albumin / creatinine urine ratio; Future    Lipid Panel with Direct LDL reflex; Future    Hemoglobin A1C; Future    Comprehensive metabolic panel; Future    CBC and differential; Future    Albumin / creatinine urine ratio; Future    Lipid Panel with Direct LDL reflex; Future    Hemoglobin A1C    Comprehensive metabolic panel    CBC and Platelet    Albumin / creatinine urine ratio    Lipid Panel with Direct LDL reflex    Hemoglobin A1C    Comprehensive metabolic panel    CBC and differential    Albumin / creatinine urine ratio    Lipid Panel with Direct LDL reflex

## 2024-10-16 NOTE — PROGRESS NOTES
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  Will adjust medications as necessary after results are obtained.  Discussed need for good foot and eye care, exercise at least 150 minutes per week.   Lab Results   Component Value Date    HGBA1C 10.6 (H) 2023       Orders:    Hemoglobin A1C; Future    Comprehensive metabolic panel; Future    CBC and Platelet; Future    Albumin / creatinine urine ratio; Future    Lipid Panel with Direct LDL reflex; Future    Hemoglobin A1C; Future    Comprehensive metabolic panel; Future    CBC and differential; Future    Albumin / creatinine urine ratio; Future    Lipid Panel with Direct LDL reflex; Future    Hemoglobin A1C    Comprehensive metabolic panel    CBC and Platelet    Albumin / creatinine urine ratio    Lipid Panel with Direct LDL reflex    Hemoglobin A1C    Comprehensive metabolic panel    CBC and differential    Albumin / creatinine urine ratio    Lipid Panel with Direct LDL reflex    Benign essential hypertension  Well controlled and will continue current medications as ordered.    Orders:    Hemoglobin A1C; Future    Comprehensive metabolic panel; Future    CBC and Platelet; Future    Albumin / creatinine urine ratio; Future    Lipid Panel with Direct LDL reflex; Future    Hemoglobin A1C; Future    Comprehensive metabolic panel; Future    CBC and differential; Future    Albumin / creatinine urine ratio; Future    Lipid Panel with Direct LDL reflex; Future    Hemoglobin A1C    Comprehensive metabolic panel    CBC and Platelet    Albumin / creatinine urine ratio    Lipid Panel  "with Direct LDL reflex    Hemoglobin A1C    Comprehensive metabolic panel    CBC and differential    Albumin / creatinine urine ratio    Lipid Panel with Direct LDL reflex    Mixed hyperlipidemia  Continue rosuvastatin and will check labs for lipids and LFT's.   Orders:    Hemoglobin A1C; Future    Comprehensive metabolic panel; Future    CBC and Platelet; Future    Albumin / creatinine urine ratio; Future    Lipid Panel with Direct LDL reflex; Future    Hemoglobin A1C; Future    Comprehensive metabolic panel; Future    CBC and differential; Future    Albumin / creatinine urine ratio; Future    Lipid Panel with Direct LDL reflex; Future    Hemoglobin A1C    Comprehensive metabolic panel    CBC and Platelet    Albumin / creatinine urine ratio    Lipid Panel with Direct LDL reflex    Hemoglobin A1C    Comprehensive metabolic panel    CBC and differential    Albumin / creatinine urine ratio    Lipid Panel with Direct LDL reflex    Ischemic congestive cardiomyopathy (HCC)  Managed by cardiology.         Need for COVID-19 vaccine    Orders:    COVID-19 Pfizer mRNA vaccine 12 yr and older (Comirnaty pre-filled syringe)    Need for vaccination    Orders:    influenza vaccine, recombinant, PF, 0.5 mL IM (Flublok)       History of Present Illness     Here for follow up of DM.  Has not had labwork for his DM in quite a while.  Glucose ranging 180-200 at home. Denies hypoglycemic episodes.  Continues to follow with cardiology and denies any new complaints or issues.  Denies foot or stump issues.            Review of Systems   Constitutional: Negative.    Respiratory: Negative.  Negative for shortness of breath.    Cardiovascular: Negative.  Negative for chest pain.   Endocrine: Negative for polydipsia, polyphagia and polyuria.           Objective     /86   Pulse 58   Temp (!) 96 °F (35.6 °C) (Tympanic)   Resp 16   Ht 5' 7\" (1.702 m)   Wt 89.3 kg (196 lb 12.8 oz)   BMI 30.82 kg/m²     Physical Exam  Constitutional:  "      Appearance: Normal appearance.   HENT:      Head: Normocephalic and atraumatic.      Mouth/Throat:      Mouth: Mucous membranes are moist.   Eyes:      Pupils: Pupils are equal, round, and reactive to light.   Cardiovascular:      Rate and Rhythm: Normal rate and regular rhythm.      Heart sounds: No murmur heard.     No friction rub. No gallop.   Pulmonary:      Effort: Pulmonary effort is normal.      Breath sounds: Normal breath sounds. No wheezing, rhonchi or rales.   Abdominal:      General: Abdomen is flat. Bowel sounds are normal.      Palpations: Abdomen is soft.      Tenderness: There is no abdominal tenderness. There is no guarding.   Musculoskeletal:         General: No swelling.   Neurological:      Mental Status: He is alert.

## 2024-10-16 NOTE — ASSESSMENT & PLAN NOTE
Well controlled and will continue current medications as ordered.    Orders:    Hemoglobin A1C; Future    Comprehensive metabolic panel; Future    CBC and Platelet; Future    Albumin / creatinine urine ratio; Future    Lipid Panel with Direct LDL reflex; Future    Hemoglobin A1C; Future    Comprehensive metabolic panel; Future    CBC and differential; Future    Albumin / creatinine urine ratio; Future    Lipid Panel with Direct LDL reflex; Future    Hemoglobin A1C    Comprehensive metabolic panel    CBC and Platelet    Albumin / creatinine urine ratio    Lipid Panel with Direct LDL reflex    Hemoglobin A1C    Comprehensive metabolic panel    CBC and differential    Albumin / creatinine urine ratio    Lipid Panel with Direct LDL reflex

## 2024-10-16 NOTE — ASSESSMENT & PLAN NOTE
Continue rosuvastatin and will check labs for lipids and LFT's.   Orders:    Hemoglobin A1C; Future    Comprehensive metabolic panel; Future    CBC and Platelet; Future    Albumin / creatinine urine ratio; Future    Lipid Panel with Direct LDL reflex; Future    Hemoglobin A1C; Future    Comprehensive metabolic panel; Future    CBC and differential; Future    Albumin / creatinine urine ratio; Future    Lipid Panel with Direct LDL reflex; Future    Hemoglobin A1C    Comprehensive metabolic panel    CBC and Platelet    Albumin / creatinine urine ratio    Lipid Panel with Direct LDL reflex    Hemoglobin A1C    Comprehensive metabolic panel    CBC and differential    Albumin / creatinine urine ratio    Lipid Panel with Direct LDL reflex

## 2024-10-23 NOTE — PRE-PROCEDURE INSTRUCTIONS
Pre-Surgery Instructions:   Medication Instructions    amiodarone 200 mg tablet Take day of surgery.    aspirin (ECOTRIN LOW STRENGTH) 81 mg EC tablet Hold day of surgery.    carvedilol (COREG) 12.5 mg tablet Take day of surgery.    Empagliflozin (JARDIANCE) 10 MG TABS tablet Stop taking 4 days prior to surgery.    furosemide (LASIX) 40 mg tablet Hold day of surgery.    Insulin Glargine Solostar (Lantus SoloStar) 100 UNIT/ML SOPN Take day of surgery.    pantoprazole (PROTONIX) 40 mg tablet Take day of surgery.    rosuvastatin (CRESTOR) 20 MG tablet Take day of surgery.    spironolactone (ALDACTONE) 25 mg tablet Hold day of surgery.    valsartan (DIOVAN) 40 mg tablet Hold day of surgery.    warfarin (COUMADIN) 2.5 mg tablet Hold day of surgery.    warfarin (Coumadin) 5 mg tablet Hold day of surgery.    Medication instructions for day surgery reviewed. Please use only a sip of water to take your instructed medications. Avoid all over the counter vitamins, supplements and NSAIDS for one week prior to surgery per anesthesia guidelines. Tylenol is ok to take as needed.     You will receive a call one business day prior to surgery with an arrival time and hospital directions. If your surgery is scheduled on a Monday, the hospital will be calling you on the Friday prior to your surgery. If you have not heard from anyone by 8pm, please call the hospital supervisor through the hospital  at [phone]. ([place] [phone] or [place] [phone]).    Do not eat or drink anything after midnight the night before your surgery, including candy, mints, lifesavers, or chewing gum. Do not drink alcohol 24hrs before your surgery. Try not to smoke at least 24hrs before your surgery.       Follow the pre surgery showering instructions as listed in the “My Surgical Experience Booklet” or otherwise provided by your surgeon's office. Do not use a blade to shave the surgical area 1 week before surgery. It is okay to use a  clean electric clippers up to 24 hours before surgery. Do not apply any lotions, creams, including makeup, cologne, deodorant, or perfumes after showering on the day of your surgery. Do not use dry shampoo, hair spray, hair gel, or any type of hair products.     No contact lenses, eye make-up, or artificial eyelashes. Remove nail polish, including gel polish, and any artificial, gel, or acrylic nails if possible. Remove all jewelry including rings and body piercing jewelry.     Wear causal clothing that is easy to take on and off. Consider your type of surgery.    Keep any valuables, jewelry, piercings at home. Please bring any specially ordered equipment (sling, braces) if indicated.    Arrange for a responsible person to drive you to and from the hospital on the day of your surgery. Please confirm the visitor policy for the day of your procedure when you receive your phone call with an arrival time.     Call the surgeon's office with any new illnesses, exposures, or additional questions prior to surgery.    Please reference your “My Surgical Experience Booklet” for additional information to prepare for your upcoming surgery.

## 2024-10-27 ENCOUNTER — ANESTHESIA EVENT (OUTPATIENT)
Dept: PERIOP | Facility: AMBULARY SURGERY CENTER | Age: 59
End: 2024-10-27
Payer: COMMERCIAL

## 2024-10-27 NOTE — ANESTHESIA PREPROCEDURE EVALUATION
Procedure:  EXTRACTION EXTRACAPSULAR CATARACT PHACO INTRAOCULAR LENS (IOL) (Left: Eye)    Relevant Problems   CARDIO   (+) Benign essential hypertension   (+) Chronic systolic congestive heart failure (HCC)   (+) Hyperlipidemia   (+) Paroxysmal atrial fibrillation (HCC)      ENDO   (+) Type 2 diabetes mellitus with ophthalmic complication, with long-term current use of insulin (HCC)      GI/HEPATIC   (+) Gastroesophageal reflux disease without esophagitis      NEURO/PSYCH   (+) Anxiety disorder      Care Coordination   (+) S/P unilateral BKA (below knee amputation), left (HCC)      Surgery/Wound/Pain   (+) S/P AVR (aortic valve replacement) with bioprosthetic valve 23 mm at [place]   (+) S/P CABG x 3      Orthopedic/Musculoskeletal   (+) Amputated below knee (HCC)      Other   (+) Anticoagulant long-term use        Physical Exam    Airway    Mallampati score: II  TM Distance: >3 FB  Neck ROM: full     Dental    upper dentures    Cardiovascular  Rhythm: regular, Rate: normal    Pulmonary   Breath sounds clear to auscultation    Other Findings        Anesthesia Plan  ASA Score- 3     Anesthesia Type- IV sedation with anesthesia with ASA Monitors.         Additional Monitors:     Airway Plan:            Plan Factors-    Chart reviewed.        Patient is not a current smoker.              Induction- intravenous.    Postoperative Plan-     Perioperative Resuscitation Plan - Level 1 - Full Code.       Informed Consent- Anesthetic plan and risks discussed with patient.  I personally reviewed this patient with the CRNA. Discussed and agreed on the Anesthesia Plan with the CRNA..

## 2024-10-28 ENCOUNTER — HOSPITAL ENCOUNTER (OUTPATIENT)
Facility: AMBULARY SURGERY CENTER | Age: 59
Setting detail: OUTPATIENT SURGERY
Discharge: HOME | End: 2024-10-28
Attending: OPHTHALMOLOGY | Admitting: OPHTHALMOLOGY
Payer: COMMERCIAL

## 2024-10-28 ENCOUNTER — ANESTHESIA (OUTPATIENT)
Dept: PERIOP | Facility: AMBULARY SURGERY CENTER | Age: 59
End: 2024-10-28
Payer: COMMERCIAL

## 2024-10-28 VITALS
OXYGEN SATURATION: 98 % | TEMPERATURE: 96.1 F | SYSTOLIC BLOOD PRESSURE: 109 MMHG | DIASTOLIC BLOOD PRESSURE: 59 MMHG | HEART RATE: 70 BPM | RESPIRATION RATE: 16 BRPM | WEIGHT: 196 LBS | HEIGHT: 67 IN | BODY MASS INDEX: 30.76 KG/M2

## 2024-10-28 DIAGNOSIS — H25.812 COMBINED FORMS OF AGE-RELATED CATARACT OF LEFT EYE: Primary | ICD-10-CM

## 2024-10-28 LAB
GLUCOSE SERPL-MCNC: 238 MG/DL (ref 65–140)
GLUCOSE SERPL-MCNC: 239 MG/DL (ref 65–140)

## 2024-10-28 PROCEDURE — 82948 REAGENT STRIP/BLOOD GLUCOSE: CPT

## 2024-10-28 PROCEDURE — V2632 POST CHMBR INTRAOCULAR LENS: HCPCS | Performed by: OPHTHALMOLOGY

## 2024-10-28 DEVICE — STERILE UV AND BLUE LIGHT FILTERING ACRYLIC FOLDABLE ASPHERIC POSTERIOR CHAMBER INTRAOCULAR LENS
Type: IMPLANTABLE DEVICE | Site: EYE | Status: FUNCTIONAL
Brand: CLAREON

## 2024-10-28 RX ORDER — KETOROLAC TROMETHAMINE 5 MG/ML
1 SOLUTION OPHTHALMIC 4 TIMES DAILY
Start: 2024-10-28

## 2024-10-28 RX ORDER — LIDOCAINE HYDROCHLORIDE 20 MG/ML
1 JELLY TOPICAL
Status: COMPLETED | OUTPATIENT
Start: 2024-10-28 | End: 2024-10-28

## 2024-10-28 RX ORDER — CYCLOPENTOLATE HYDROCHLORIDE 10 MG/ML
1 SOLUTION/ DROPS OPHTHALMIC
Status: COMPLETED | OUTPATIENT
Start: 2024-10-28 | End: 2024-10-28

## 2024-10-28 RX ORDER — LIDOCAINE HYDROCHLORIDE 10 MG/ML
INJECTION, SOLUTION EPIDURAL; INFILTRATION; INTRACAUDAL; PERINEURAL AS NEEDED
Status: DISCONTINUED | OUTPATIENT
Start: 2024-10-28 | End: 2024-10-28 | Stop reason: HOSPADM

## 2024-10-28 RX ORDER — TETRACAINE HYDROCHLORIDE 5 MG/ML
1 SOLUTION OPHTHALMIC ONCE
Status: COMPLETED | OUTPATIENT
Start: 2024-10-28 | End: 2024-10-28

## 2024-10-28 RX ORDER — TETRACAINE HYDROCHLORIDE 5 MG/ML
SOLUTION OPHTHALMIC AS NEEDED
Status: DISCONTINUED | OUTPATIENT
Start: 2024-10-28 | End: 2024-10-28 | Stop reason: HOSPADM

## 2024-10-28 RX ORDER — BALANCED SALT SOLUTION 6.4; .75; .48; .3; 3.9; 1.7 MG/ML; MG/ML; MG/ML; MG/ML; MG/ML; MG/ML
SOLUTION OPHTHALMIC AS NEEDED
Status: DISCONTINUED | OUTPATIENT
Start: 2024-10-28 | End: 2024-10-28 | Stop reason: HOSPADM

## 2024-10-28 RX ORDER — PREDNISOLONE ACETATE 10 MG/ML
1 SUSPENSION/ DROPS OPHTHALMIC 4 TIMES DAILY
Start: 2024-10-28

## 2024-10-28 RX ORDER — KETOROLAC TROMETHAMINE 5 MG/ML
1 SOLUTION OPHTHALMIC
Status: COMPLETED | OUTPATIENT
Start: 2024-10-28 | End: 2024-10-28

## 2024-10-28 RX ORDER — MIDAZOLAM HYDROCHLORIDE 2 MG/2ML
INJECTION, SOLUTION INTRAMUSCULAR; INTRAVENOUS AS NEEDED
Status: DISCONTINUED | OUTPATIENT
Start: 2024-10-28 | End: 2024-10-28

## 2024-10-28 RX ORDER — GATIFLOXACIN 5 MG/ML
SOLUTION/ DROPS OPHTHALMIC AS NEEDED
Status: DISCONTINUED | OUTPATIENT
Start: 2024-10-28 | End: 2024-10-28 | Stop reason: HOSPADM

## 2024-10-28 RX ORDER — GATIFLOXACIN 5 MG/ML
1 SOLUTION/ DROPS OPHTHALMIC 2 TIMES DAILY
Start: 2024-10-28

## 2024-10-28 RX ORDER — POVIDONE-IODINE 5 %
SOLUTION, NON-ORAL OPHTHALMIC (EYE) AS NEEDED
Status: DISCONTINUED | OUTPATIENT
Start: 2024-10-28 | End: 2024-10-28 | Stop reason: HOSPADM

## 2024-10-28 RX ORDER — PHENYLEPHRINE HYDROCHLORIDE 25 MG/ML
1 SOLUTION/ DROPS OPHTHALMIC
Status: COMPLETED | OUTPATIENT
Start: 2024-10-28 | End: 2024-10-28

## 2024-10-28 RX ADMIN — KETOROLAC TROMETHAMINE 1 DROP: 5 SOLUTION OPHTHALMIC at 06:20

## 2024-10-28 RX ADMIN — PHENYLEPHRINE HYDROCHLORIDE 1 DROP: 25 SOLUTION/ DROPS OPHTHALMIC at 06:45

## 2024-10-28 RX ADMIN — TETRACAINE HYDROCHLORIDE 1 DROP: 5 SOLUTION OPHTHALMIC at 06:19

## 2024-10-28 RX ADMIN — CYCLOPENTOLATE HYDROCHLORIDE 1 DROP: 10 SOLUTION/ DROPS OPHTHALMIC at 07:01

## 2024-10-28 RX ADMIN — LIDOCAINE HYDROCHLORIDE 1 APPLICATION: 20 JELLY TOPICAL at 06:45

## 2024-10-28 RX ADMIN — KETOROLAC TROMETHAMINE 1 DROP: 5 SOLUTION OPHTHALMIC at 07:02

## 2024-10-28 RX ADMIN — KETOROLAC TROMETHAMINE 1 DROP: 5 SOLUTION OPHTHALMIC at 06:34

## 2024-10-28 RX ADMIN — KETOROLAC TROMETHAMINE 1 DROP: 5 SOLUTION OPHTHALMIC at 06:45

## 2024-10-28 RX ADMIN — PHENYLEPHRINE HYDROCHLORIDE 1 DROP: 25 SOLUTION/ DROPS OPHTHALMIC at 07:01

## 2024-10-28 RX ADMIN — PHENYLEPHRINE HYDROCHLORIDE 1 DROP: 25 SOLUTION/ DROPS OPHTHALMIC at 06:33

## 2024-10-28 RX ADMIN — PHENYLEPHRINE HYDROCHLORIDE 1 DROP: 25 SOLUTION/ DROPS OPHTHALMIC at 06:20

## 2024-10-28 RX ADMIN — MIDAZOLAM 2 MG: 1 INJECTION INTRAMUSCULAR; INTRAVENOUS at 07:05

## 2024-10-28 RX ADMIN — CYCLOPENTOLATE HYDROCHLORIDE 1 DROP: 10 SOLUTION/ DROPS OPHTHALMIC at 06:33

## 2024-10-28 RX ADMIN — CYCLOPENTOLATE HYDROCHLORIDE 1 DROP: 10 SOLUTION/ DROPS OPHTHALMIC at 06:20

## 2024-10-28 RX ADMIN — LIDOCAINE HYDROCHLORIDE 1 APPLICATION: 20 JELLY TOPICAL at 06:21

## 2024-10-28 RX ADMIN — LIDOCAINE HYDROCHLORIDE 1 APPLICATION: 20 JELLY TOPICAL at 06:34

## 2024-10-28 RX ADMIN — CYCLOPENTOLATE HYDROCHLORIDE 1 DROP: 10 SOLUTION/ DROPS OPHTHALMIC at 06:53

## 2024-10-28 NOTE — DISCHARGE INSTR - AVS FIRST PAGE
Dr. Mcgrath Cataract Instructions    Activity:     1.  No Driving until instructed   2.  Keep shield on until seen tomorrow except when administering drops   3.  No heavy lifting   4.  No water in eye     Diet:     1.  Resume normal diet    Normal Symptoms:     1.  Mild Headache   2. Scratchy or picky feeling around eye    Call the office if:     1.  You have any questions or concerns   2.  If eye pain is not relieved by extra strength tylenol    Office phone number:  [phone]      Next appointment:     1.  See Dr Mcgrath at his office tomorrow as scheduled   __________________________________________________________   2.  Bring blue eye kit with you and eyedrops to the office    A new set of comprehensive instructions will be given and reviewed with you during your office visit tomorrow.

## 2024-10-28 NOTE — OP NOTE
OPERATIVE REPORT    PATIENT NAME: Yoav Talbert    :  1965  MRN: [id number]  Pt Location: [facility] OR ROOM 01    Surgery Date: 10/28/2024    Surgeons and Role:     * Obey Mcgrath MD - Primary    Posterior subcapsular polar age-related cataract, left eye [H25.042]    Post-Op Diagnosis Codes:     * Posterior subcapsular polar age-related cataract, left eye [H25.042]    Procedure(s):  EXTRACTION EXTRACAPSULAR CATARACT PHACO INTRAOCULAR LENS (IOL)    Anesthesia Type:   IV Sedation with Anesthesia    Operative Indications:  Posterior subcapsular polar age-related cataract, left eye [H25.042]  Decreased vision to count fingers. With problems reading.  Pt requested cataract sx the left eye    Procedure and Technique:    Procedure Details     The patient was brought in the OR in stable condition and placed on the operative table. The left eye was prepped and draped in the usual sterile manner. Attention was directed to the left eye where a lid speculum was placed. A 2.4 mm clear corneal incision was made temperally. 1/2 cc of 1% MPF Lidocaine was irrigated into the anterior chamber followed by viscoat. The side port incision was placed superiorly. The capsularrhexis was made and the nucleus was hydrodissected with BSS. The nucleus was then removed with the phaco handpiece followed by removal of the cortical material with the I/A handpiece. The capsular bag was then filled with Provisc. The IOL was folded and placed in to the capsular bag and centered well. The remaining Provisc was removed from the eye with the I/A. The wounds were hydrated with BSS and found to be water tight. The lid speculum was removed and 2 drops of Gatifloxicin were placed over the cornea. A protective eye shield was taped over the eye and the patient went to PACU in stable condition. I will see the patient in the office tomorrow and the expected post op period is a few weeks.       Complications: None        Disposition: PACU   Condition:  Stable    SIGNATURE: Obey Mcgrath MD  DATE: October 28, 2024  TIME: 7:33 AM

## 2024-10-28 NOTE — ANESTHESIA POSTPROCEDURE EVALUATION
Post-Op Assessment Note    CV Status:  Stable  Pain Score: 0    Pain management: adequate       Mental Status:  Awake   Hydration Status:  Stable   PONV Controlled:  None   Airway Patency:  Patent     Post Op Vitals Reviewed: Yes    No anethesia notable event occurred.    Staff: Anesthesiologist           Last Filed PACU Vitals:  Vitals Value Taken Time   Temp     Pulse 70 10/28/24 0728   /59 10/28/24 0728   Resp 16 10/28/24 0728   SpO2 98 % 10/28/24 0728       Modified Yuri:  Activity: 2 (10/28/2024  7:28 AM)  Respiration: 2 (10/28/2024  7:28 AM)  Circulation: 2 (10/28/2024  7:28 AM)  Consciousness: 2 (10/28/2024  7:28 AM)  Oxygen Saturation: 2 (10/28/2024  7:28 AM)  Modified Yuri Score: 10 (10/28/2024  7:28 AM)

## 2024-10-28 NOTE — ANESTHESIA POSTPROCEDURE EVALUATION
Post-Op Assessment Note    CV Status:  Stable  Pain Score: 0    Pain management: adequate       Mental Status:  Alert   Hydration Status:  Stable   PONV Controlled:  None   Airway Patency:  Patent     Post Op Vitals Reviewed: Yes    No anethesia notable event occurred.    Staff: Anesthesiologist, CRNA           Last Filed PACU Vitals:  Vitals Value Taken Time   Temp     Pulse 60    /68    Resp 13    SpO2 98        Modified Yuri:  No data recorded

## 2024-11-07 ENCOUNTER — PATIENT OUTREACH (OUTPATIENT)
Dept: CASE MANAGEMENT | Facility: OTHER | Age: 59
End: 2024-11-07

## 2024-11-07 NOTE — PROGRESS NOTES
Outpatient Care Management Note:  CM referral received for assistance with managing DM due to elevated A1C.  Chart review completed.    History includes DM with most recent A1C on 10.6 in 12/2023, HTN, HF, cardiomyopathy, GERD, Anxiety, (L) BKA, CABG X 3, AVR.     10/18/2024 PCP visit-needs updated labs,  Uses a Dexcom.  Currently on insulin and Jardiance,     10/28/2024-cataract surgery completed.      Outreach call scheduled.

## 2024-11-11 ENCOUNTER — PATIENT OUTREACH (OUTPATIENT)
Dept: CASE MANAGEMENT | Facility: OTHER | Age: 59
End: 2024-11-11

## 2024-11-11 NOTE — PROGRESS NOTES
Outpatient Care Management Note:  Outreach call attempted to Mr. Talbert.  Message left for patient to please return call.  Contact information left on message.

## 2024-11-15 ENCOUNTER — PATIENT OUTREACH (OUTPATIENT)
Dept: CASE MANAGEMENT | Facility: OTHER | Age: 59
End: 2024-11-15

## 2024-11-15 NOTE — PROGRESS NOTES
Outpatient Care Management Note:  Outreach call attempted to Mr. Talbert.  Message left for patient to please return call.  Contact information left on message.     As this is the second unsuccessful outreach attempt, an [facility] letter is being sent to address on file.  If no response in two weeks, care management episode will be closed.

## 2024-11-20 DIAGNOSIS — E11.51 DIABETES MELLITUS WITH PERIPHERAL CIRCULATORY DISORDER, CONTROLLED (HCC): ICD-10-CM

## 2024-11-21 RX ORDER — INSULIN GLARGINE 100 [IU]/ML
0.68 INJECTION, SOLUTION SUBCUTANEOUS DAILY
Qty: 15 ML | Refills: 5 | Status: SHIPPED | OUTPATIENT
Start: 2024-11-21

## 2024-11-21 NOTE — TELEPHONE ENCOUNTER
Per note 6/11/24 PA is not required for the lantus, can office perhaps call pharmacy and advise them of this. Thank you

## 2024-11-21 NOTE — TELEPHONE ENCOUNTER
Per pharm pt needs prior auth for Lantus but he is going to be out of medication today please advise what he should do please call pt.

## 2024-11-29 ENCOUNTER — PATIENT OUTREACH (OUTPATIENT)
Dept: CASE MANAGEMENT | Facility: OTHER | Age: 59
End: 2024-11-29

## 2024-11-29 DIAGNOSIS — I50.22 CHRONIC SYSTOLIC CONGESTIVE HEART FAILURE (HCC): ICD-10-CM

## 2024-11-29 RX ORDER — FUROSEMIDE 40 MG/1
40 TABLET ORAL DAILY
Qty: 90 TABLET | Refills: 0 | Status: SHIPPED | OUTPATIENT
Start: 2024-11-29

## 2024-11-29 NOTE — PROGRESS NOTES
Outpatient Care Management Note:  No response to [facility] letter or telephone outreach attempts.  Closing care management episode and removing RN CM from care team.

## 2024-12-13 DIAGNOSIS — E11.51 DIABETES MELLITUS WITH PERIPHERAL CIRCULATORY DISORDER, CONTROLLED (HCC): ICD-10-CM

## 2024-12-13 RX ORDER — INSULIN GLARGINE 100 [IU]/ML
0.68 INJECTION, SOLUTION SUBCUTANEOUS DAILY
Qty: 15 ML | Refills: 0 | Status: SHIPPED | OUTPATIENT
Start: 2024-12-13

## 2024-12-31 NOTE — ADDENDUM NOTE
Addended by: Lauren Flores on: 10/18/2023 10:52 AM     Modules accepted: Orders Your cxr today shows no pneumonia.     Your d dimer today was within the normal range.     Your influenza test was negative today.      Your covid test had to be sent out and will show up in My Chart.     If you want to proceed with a chest CT you will need to go the emergency room to get a CT scan.

## 2025-01-08 ENCOUNTER — TELEPHONE (OUTPATIENT)
Age: 60
End: 2025-01-08

## 2025-01-08 NOTE — TELEPHONE ENCOUNTER
PA for LANTUS SOLOSTAR- SUBMITTED to [facility]    via    [x]CMM-KEY: [id number]      [x]PA sent as URGENT    All office notes, labs and other pertaining documents and studies sent. Clinical questions answered. Awaiting determination from insurance company.     Turnaround time for your insurance to make a decision on your Prior Authorization can take 7-21 business days.

## 2025-01-10 NOTE — TELEPHONE ENCOUNTER
Patient is completely out of his lantus.    He is going to call his insurance to see if there is something they can do.  He is also going to talk to the pharmacy to see if he can pay out of pocket to hold him over until the auth comes through.

## 2025-02-17 ENCOUNTER — OFFICE VISIT (OUTPATIENT)
Dept: FAMILY MEDICINE CLINIC | Facility: CLINIC | Age: 60
End: 2025-02-17
Payer: COMMERCIAL

## 2025-02-17 VITALS
TEMPERATURE: 97.4 F | BODY MASS INDEX: 30.76 KG/M2 | HEART RATE: 64 BPM | DIASTOLIC BLOOD PRESSURE: 60 MMHG | HEIGHT: 67 IN | SYSTOLIC BLOOD PRESSURE: 106 MMHG | OXYGEN SATURATION: 97 % | WEIGHT: 196 LBS | RESPIRATION RATE: 18 BRPM

## 2025-02-17 DIAGNOSIS — E11.319 TYPE 2 DIABETES MELLITUS WITH RETINOPATHY OF LEFT EYE, WITH LONG-TERM CURRENT USE OF INSULIN, MACULAR EDEMA PRESENCE UNSPECIFIED, UNSPECIFIED RETINOPATHY SEVERITY (HCC): Primary | ICD-10-CM

## 2025-02-17 DIAGNOSIS — E78.2 MIXED HYPERLIPIDEMIA: Chronic | ICD-10-CM

## 2025-02-17 DIAGNOSIS — I48.0 PAROXYSMAL ATRIAL FIBRILLATION (HCC): ICD-10-CM

## 2025-02-17 DIAGNOSIS — I50.22 CHRONIC SYSTOLIC CONGESTIVE HEART FAILURE (HCC): ICD-10-CM

## 2025-02-17 DIAGNOSIS — S88.119S: ICD-10-CM

## 2025-02-17 DIAGNOSIS — Z79.4 TYPE 2 DIABETES MELLITUS WITH RETINOPATHY OF LEFT EYE, WITH LONG-TERM CURRENT USE OF INSULIN, MACULAR EDEMA PRESENCE UNSPECIFIED, UNSPECIFIED RETINOPATHY SEVERITY (HCC): Primary | ICD-10-CM

## 2025-02-17 DIAGNOSIS — I10 BENIGN ESSENTIAL HYPERTENSION: Chronic | ICD-10-CM

## 2025-02-17 DIAGNOSIS — Z12.11 COLON CANCER SCREENING: ICD-10-CM

## 2025-02-17 DIAGNOSIS — L98.9 EXTERNAL NASAL LESION: ICD-10-CM

## 2025-02-17 PROCEDURE — 99214 OFFICE O/P EST MOD 30 MIN: CPT | Performed by: INTERNAL MEDICINE

## 2025-02-17 RX ORDER — MUPIROCIN 20 MG/G
OINTMENT TOPICAL 3 TIMES DAILY
Qty: 15 G | Refills: 1 | Status: SHIPPED | OUTPATIENT
Start: 2025-02-17

## 2025-02-17 NOTE — PROGRESS NOTES
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  Discussed need for good foot, stump, and eye care.   Lab Results   Component Value Date    HGBA1C 10.6 (H) 2023            Benign essential hypertension  Well controlled on current medications and will continue.        Chronic systolic congestive heart failure (HCC)  Wt Readings from Last 3 Encounters:   25 88.9 kg (196 lb)   10/28/24 88.9 kg (196 lb)   10/16/24 89.3 kg (196 lb 12.8 oz)     He was advised to continue daily weights and continue medications per cardiology.                Mixed hyperlipidemia  Will check labs for lipids and LFT's.  Continue rosuvastatin.       Colon cancer screening    Orders:  •  Cologuard    Unilateral complete below-knee amputation, sequela (HCC)  He continues to follow with his prosthetic specialist.        Paroxysmal atrial fibrillation (HCC)         External nasal lesion    Orders:  •  mupirocin (BACTROBAN) 2 % ointment; Apply topically 3 (three) times a day           History of Present Illness   Here for follow up visit. He did not yet get his labs done.  Continues to follow with cardiology at [facility] and denies new complaints or issues, no chest pain or dyspnea.  No issues with amputation stump.  Denies hypoglycemic episodes.  Has a spot just under nare that heals and then opens up again, like a lozano.        Review of Systems   Constitutional: Negative.    Respiratory: Negative.     Cardiovascular: Negative.    Endocrine: Negative for polydipsia,  "polyphagia and polyuria.   Skin:  Positive for wound.       Objective   /60   Pulse 64   Temp (!) 97.4 °F (36.3 °C)   Resp 18   Ht 5' 7\" (1.702 m)   Wt 88.9 kg (196 lb)   SpO2 97%   BMI 30.70 kg/m²      Physical Exam  Constitutional:       Appearance: Normal appearance.   HENT:      Head: Normocephalic and atraumatic.      Mouth/Throat:      Mouth: Mucous membranes are moist.   Eyes:      Pupils: Pupils are equal, round, and reactive to light.   Cardiovascular:      Rate and Rhythm: Normal rate and regular rhythm.      Heart sounds: No murmur heard.     No friction rub. No gallop.   Pulmonary:      Effort: Pulmonary effort is normal.      Breath sounds: Normal breath sounds. No wheezing, rhonchi or rales.   Abdominal:      General: Abdomen is flat. Bowel sounds are normal.      Palpations: Abdomen is soft.      Tenderness: There is no abdominal tenderness. There is no guarding.   Musculoskeletal:         General: No swelling.   Skin:     Comments: Open lesion with scabbing just under R nare   Neurological:      Mental Status: He is alert.         "

## 2025-02-17 NOTE — ASSESSMENT & PLAN NOTE
Wt Readings from Last 3 Encounters:   02/17/25 88.9 kg (196 lb)   10/28/24 88.9 kg (196 lb)   10/16/24 89.3 kg (196 lb 12.8 oz)     He was advised to continue daily weights and continue medications per cardiology.

## 2025-02-17 NOTE — ASSESSMENT & PLAN NOTE
Has not been well controlled, but patient has not gotten his labs done as requested after adjustment of medication, and states he will do so tomorrow.  Discussed need for good foot, stump, and eye care.   Lab Results   Component Value Date    HGBA1C 10.6 (H) 12/01/2023

## 2025-02-23 DIAGNOSIS — I50.22 CHRONIC SYSTOLIC CONGESTIVE HEART FAILURE (HCC): ICD-10-CM

## 2025-02-24 RX ORDER — FUROSEMIDE 40 MG/1
40 TABLET ORAL DAILY
Qty: 90 TABLET | Refills: 3 | Status: SHIPPED | OUTPATIENT
Start: 2025-02-24

## 2025-03-18 ENCOUNTER — TELEPHONE (OUTPATIENT)
Dept: FAMILY MEDICINE CLINIC | Facility: CLINIC | Age: 60
End: 2025-03-18

## 2025-03-18 NOTE — TELEPHONE ENCOUNTER
Patient brought in 2 scripts for Spironolactone (25mg 1/2 tablet daily) and Valsartan (40mg 1 tablet daily). He normally gets scripts from cardiologist, but he stated that they recently only gave him a short supply, due to needing an appt. He was requesting Dr Stern to fill these 2 scripts but I informed patient it sounds like you need to schedule an appt with cardio and they can do the refill - Dr Stern hasn't filled these previously. He still requested me to send message and was asking Dr Stern to call him about this [phone]. Please advise (FYI- scripts are scanned in media for reference if needed)   Universal Safety Interventions

## 2025-03-19 NOTE — TELEPHONE ENCOUNTER
He needs to get these done through cardiology as they manage these meds.    Problem: Diabetes, Type 2 (Adult)  Goal: Signs and Symptoms of Listed Potential Problems Will be Absent or Manageable (Diabetes, Type 2)    11/03/17 0120   Diabetes, Type 2   Problems Assessed (Type 2 Diabetes) diabetic ketoacidosis (DKA)/hyperosmolar hyperglycemic state (HHS);impaired glycemic control   Problems Present (Type 2 Diabetes) impaired glycemic control         Problem: Skin Integrity Impairment, Risk/Actual (Adult)  Goal: Skin Integrity/Wound Healing    11/03/17 0120   Skin Integrity Impairment, Risk/Actual (Adult)   Skin Integrity/Wound Healing making progress toward outcome

## 2025-03-24 ENCOUNTER — RESULTS FOLLOW-UP (OUTPATIENT)
Dept: FAMILY MEDICINE CLINIC | Facility: CLINIC | Age: 60
End: 2025-03-24

## 2025-03-24 LAB — COLOGUARD RESULT REPORTABLE: NEGATIVE

## 2025-03-25 LAB
ALBUMIN SERPL-MCNC: 4.2 G/DL (ref 3.9–4.9)
ALBUMIN/CREAT UR: 867 MG/G CREAT (ref 0–29)
ALP SERPL-CCNC: 79 IU/L (ref 44–121)
ALT SERPL-CCNC: 15 IU/L (ref 0–44)
AST SERPL-CCNC: 17 IU/L (ref 0–40)
BASOPHILS # BLD AUTO: 0 X10E3/UL (ref 0–0.2)
BASOPHILS NFR BLD AUTO: 0 %
BILIRUB SERPL-MCNC: 0.3 MG/DL (ref 0–1.2)
BUN SERPL-MCNC: 21 MG/DL (ref 8–27)
BUN/CREAT SERPL: 20 (ref 10–24)
CALCIUM SERPL-MCNC: 8.8 MG/DL (ref 8.6–10.2)
CHLORIDE SERPL-SCNC: 101 MMOL/L (ref 96–106)
CHOLEST SERPL-MCNC: 179 MG/DL (ref 100–199)
CO2 SERPL-SCNC: 24 MMOL/L (ref 20–29)
CREAT SERPL-MCNC: 1.05 MG/DL (ref 0.76–1.27)
CREAT UR-MCNC: 94.6 MG/DL
EGFR: 81 ML/MIN/1.73
EOSINOPHIL # BLD AUTO: 0.2 X10E3/UL (ref 0–0.4)
EOSINOPHIL NFR BLD AUTO: 3 %
ERYTHROCYTE [DISTWIDTH] IN BLOOD BY AUTOMATED COUNT: 12.5 % (ref 11.6–15.4)
EST. AVERAGE GLUCOSE BLD GHB EST-MCNC: 235 MG/DL
GLOBULIN SER-MCNC: 2.4 G/DL (ref 1.5–4.5)
GLUCOSE SERPL-MCNC: 234 MG/DL (ref 70–99)
HBA1C MFR BLD: 9.8 % (ref 4.8–5.6)
HCT VFR BLD AUTO: 39.3 % (ref 37.5–51)
HDLC SERPL-MCNC: 48 MG/DL
HGB BLD-MCNC: 13 G/DL (ref 13–17.7)
IMM GRANULOCYTES # BLD: 0 X10E3/UL (ref 0–0.1)
IMM GRANULOCYTES NFR BLD: 0 %
LDLC SERPL CALC-MCNC: 81 MG/DL (ref 0–99)
LDLC/HDLC SERPL: 1.7 RATIO (ref 0–3.6)
LYMPHOCYTES # BLD AUTO: 0.9 X10E3/UL (ref 0.7–3.1)
LYMPHOCYTES NFR BLD AUTO: 13 %
MCH RBC QN AUTO: 30.8 PG (ref 26.6–33)
MCHC RBC AUTO-ENTMCNC: 33.1 G/DL (ref 31.5–35.7)
MCV RBC AUTO: 93 FL (ref 79–97)
MICROALBUMIN UR-MCNC: 820.1 UG/ML
MONOCYTES # BLD AUTO: 0.4 X10E3/UL (ref 0.1–0.9)
MONOCYTES NFR BLD AUTO: 6 %
NEUTROPHILS # BLD AUTO: 5.3 X10E3/UL (ref 1.4–7)
NEUTROPHILS NFR BLD AUTO: 78 %
PLATELET # BLD AUTO: 194 X10E3/UL (ref 150–450)
POTASSIUM SERPL-SCNC: 4.3 MMOL/L (ref 3.5–5.2)
PROT SERPL-MCNC: 6.6 G/DL (ref 6–8.5)
RBC # BLD AUTO: 4.22 X10E6/UL (ref 4.14–5.8)
SL AMB VLDL CHOLESTEROL CALC: 50 MG/DL (ref 5–40)
SODIUM SERPL-SCNC: 139 MMOL/L (ref 134–144)
TRIGL SERPL-MCNC: 307 MG/DL (ref 0–149)
WBC # BLD AUTO: 6.8 X10E3/UL (ref 3.4–10.8)

## 2025-03-27 ENCOUNTER — RESULTS FOLLOW-UP (OUTPATIENT)
Dept: FAMILY MEDICINE CLINIC | Facility: CLINIC | Age: 60
End: 2025-03-27

## 2025-03-27 DIAGNOSIS — E11.319 TYPE 2 DIABETES MELLITUS WITH RETINOPATHY OF LEFT EYE, WITH LONG-TERM CURRENT USE OF INSULIN, MACULAR EDEMA PRESENCE UNSPECIFIED, UNSPECIFIED RETINOPATHY SEVERITY (HCC): Primary | ICD-10-CM

## 2025-03-27 DIAGNOSIS — Z79.4 TYPE 2 DIABETES MELLITUS WITH RETINOPATHY OF LEFT EYE, WITH LONG-TERM CURRENT USE OF INSULIN, MACULAR EDEMA PRESENCE UNSPECIFIED, UNSPECIFIED RETINOPATHY SEVERITY (HCC): Primary | ICD-10-CM

## 2025-03-27 RX ORDER — METFORMIN HYDROCHLORIDE 500 MG/1
500 TABLET, EXTENDED RELEASE ORAL
Qty: 90 TABLET | Refills: 3 | Status: SHIPPED | OUTPATIENT
Start: 2025-03-27

## 2025-03-27 NOTE — TELEPHONE ENCOUNTER
I called patient about his labs. Glucose is still too high. He is compliant with his insulin and his jardiance.  Is on a new diet which helps.  I added low dose metformin and he will pick this up tonight.  Labs were ordered for 4 month follow up.

## 2025-03-28 ENCOUNTER — PATIENT OUTREACH (OUTPATIENT)
Dept: CASE MANAGEMENT | Facility: OTHER | Age: 60
End: 2025-03-28

## 2025-03-28 DIAGNOSIS — E11.51 DIABETES MELLITUS WITH PERIPHERAL CIRCULATORY DISORDER, CONTROLLED (HCC): Primary | ICD-10-CM

## 2025-03-28 NOTE — PROGRESS NOTES
Outpatient Care Management Note: Referral for care management services received due to A1C > 9.  Chart review completed.     History includes HTN with most recent A1C of 9.8, HF, ischemic cardiomyopathy, GERD, anxiety, (L) BKA, S/P CABG, S/P AVR, and hyperlipidemia,     2/17/2025 PCP visit.  Labs not completed prior to appointment.  Current DM medications include Jardiance and Lantus insulin.  Uses Dexcom.     3/27/2025-started on low dose metformin due to continued elevated A1C.  Repeat labs in 4 months.     Outreach call scheduled.

## 2025-04-01 ENCOUNTER — PATIENT OUTREACH (OUTPATIENT)
Dept: CASE MANAGEMENT | Facility: OTHER | Age: 60
End: 2025-04-01

## 2025-04-01 NOTE — PROGRESS NOTES
Outpatient Care Management Note:  Outreach call placed to Mr. Talbert.  Introduced myself and my role.  Mr. Talbert asks that I call another time as he is currently busy.

## 2025-04-03 LAB
LEFT EYE DIABETIC RETINOPATHY: POSITIVE
RIGHT EYE DIABETIC RETINOPATHY: POSITIVE

## 2025-04-04 ENCOUNTER — PATIENT OUTREACH (OUTPATIENT)
Dept: CASE MANAGEMENT | Facility: OTHER | Age: 60
End: 2025-04-04

## 2025-04-04 LAB
ALBUMIN SERPL-MCNC: 4.3 G/DL (ref 3.9–4.9)
ALP SERPL-CCNC: 83 IU/L (ref 44–121)
ALT SERPL-CCNC: 18 IU/L (ref 0–44)
AST SERPL-CCNC: 19 IU/L (ref 0–40)
BILIRUB SERPL-MCNC: 0.3 MG/DL (ref 0–1.2)
BUN SERPL-MCNC: 17 MG/DL (ref 8–27)
BUN/CREAT SERPL: 15 (ref 10–24)
CALCIUM SERPL-MCNC: 9 MG/DL (ref 8.6–10.2)
CHLORIDE SERPL-SCNC: 104 MMOL/L (ref 96–106)
CHOLEST SERPL-MCNC: 188 MG/DL (ref 100–199)
CHOLEST/HDLC SERPL: 3.4 RATIO (ref 0–5)
CO2 SERPL-SCNC: 23 MMOL/L (ref 20–29)
CREAT SERPL-MCNC: 1.13 MG/DL (ref 0.76–1.27)
EGFR: 74 ML/MIN/1.73
ERYTHROCYTE [DISTWIDTH] IN BLOOD BY AUTOMATED COUNT: 12.3 % (ref 11.6–15.4)
EST. AVERAGE GLUCOSE BLD GHB EST-MCNC: 217 MG/DL
GLOBULIN SER-MCNC: 2.5 G/DL (ref 1.5–4.5)
GLUCOSE SERPL-MCNC: 111 MG/DL (ref 70–99)
HBA1C MFR BLD: 9.2 % (ref 4.8–5.6)
HCT VFR BLD AUTO: 42.7 % (ref 37.5–51)
HDLC SERPL-MCNC: 55 MG/DL
HGB BLD-MCNC: 13.9 G/DL (ref 13–17.7)
LDLC SERPL CALC-MCNC: 102 MG/DL (ref 0–99)
MCH RBC QN AUTO: 30.2 PG (ref 26.6–33)
MCHC RBC AUTO-ENTMCNC: 32.6 G/DL (ref 31.5–35.7)
MCV RBC AUTO: 93 FL (ref 79–97)
MICRODELETION SYND BLD/T FISH: NORMAL
PLATELET # BLD AUTO: 205 X10E3/UL (ref 150–450)
POTASSIUM SERPL-SCNC: 5 MMOL/L (ref 3.5–5.2)
PROT SERPL-MCNC: 6.8 G/DL (ref 6–8.5)
RBC # BLD AUTO: 4.6 X10E6/UL (ref 4.14–5.8)
SL AMB VLDL CHOLESTEROL CALC: 31 MG/DL (ref 5–40)
SODIUM SERPL-SCNC: 144 MMOL/L (ref 134–144)
TRIGL SERPL-MCNC: 181 MG/DL (ref 0–149)
WBC # BLD AUTO: 5.9 X10E3/UL (ref 3.4–10.8)

## 2025-04-04 NOTE — PROGRESS NOTES
Outpatient Care Management Note:  Outreach call placed to Mr. Talbert.  Introduced myself and my role.  He is not interested in care management services to assist with managing his DM at this time.  Thanked him for his time.

## 2025-04-07 ENCOUNTER — TELEPHONE (OUTPATIENT)
Dept: FAMILY MEDICINE CLINIC | Facility: CLINIC | Age: 60
End: 2025-04-07

## 2025-04-07 ENCOUNTER — OFFICE VISIT (OUTPATIENT)
Dept: FAMILY MEDICINE CLINIC | Facility: CLINIC | Age: 60
End: 2025-04-07
Payer: COMMERCIAL

## 2025-04-07 VITALS
SYSTOLIC BLOOD PRESSURE: 110 MMHG | WEIGHT: 194.8 LBS | TEMPERATURE: 98.5 F | HEIGHT: 67 IN | HEART RATE: 70 BPM | DIASTOLIC BLOOD PRESSURE: 76 MMHG | BODY MASS INDEX: 30.57 KG/M2 | RESPIRATION RATE: 18 BRPM

## 2025-04-07 DIAGNOSIS — Z79.4 TYPE 2 DIABETES MELLITUS WITH RETINOPATHY OF LEFT EYE, WITH LONG-TERM CURRENT USE OF INSULIN, MACULAR EDEMA PRESENCE UNSPECIFIED, UNSPECIFIED RETINOPATHY SEVERITY (HCC): Primary | ICD-10-CM

## 2025-04-07 DIAGNOSIS — S88.112S BELOW-KNEE AMPUTATION OF LEFT LOWER EXTREMITY, SEQUELA (HCC): ICD-10-CM

## 2025-04-07 DIAGNOSIS — E11.319 TYPE 2 DIABETES MELLITUS WITH RETINOPATHY OF LEFT EYE, WITH LONG-TERM CURRENT USE OF INSULIN, MACULAR EDEMA PRESENCE UNSPECIFIED, UNSPECIFIED RETINOPATHY SEVERITY (HCC): Primary | ICD-10-CM

## 2025-04-07 DIAGNOSIS — E11.51 DIABETES MELLITUS WITH PERIPHERAL CIRCULATORY DISORDER, CONTROLLED (HCC): ICD-10-CM

## 2025-04-07 PROCEDURE — 99213 OFFICE O/P EST LOW 20 MIN: CPT | Performed by: INTERNAL MEDICINE

## 2025-04-07 RX ORDER — INSULIN GLARGINE 100 [IU]/ML
0.68 INJECTION, SOLUTION SUBCUTANEOUS DAILY
Qty: 24 ML | Refills: 5 | Status: SHIPPED | OUTPATIENT
Start: 2025-04-07

## 2025-04-07 NOTE — ASSESSMENT & PLAN NOTE
Lab Results   Component Value Date    HGBA1C 9.2 (H) 04/03/2025       Orders:  •  Insulin Glargine Solostar (Lantus SoloStar) 100 UNIT/ML SOPN; Inject 0.68 mL (68 Units total) under the skin in the morning BRAND NAME LANTUS MEDICALLY NECESSARY

## 2025-04-07 NOTE — ASSESSMENT & PLAN NOTE
I did change his insulin glargine back to brand name lantus, but also advised that I do not think this is the reason for his poor glucose control.  Discussed need for exercise and low carb diet, and continuation of metformin.  Will recheck labs in 3 months prior to follow up visit.   Lab Results   Component Value Date    HGBA1C 9.2 (H) 04/03/2025       Orders:  •  CBC and Platelet; Future  •  Comprehensive metabolic panel; Future  •  Hemoglobin A1C; Future  •  Lipid Panel with Direct LDL reflex; Future

## 2025-04-07 NOTE — PROGRESS NOTES
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  Discussed need for exercise and low carb diet, and continuation of metformin.  Will recheck labs in 3 months prior to follow up visit.   Lab Results   Component Value Date    HGBA1C 9.2 (H) 2025       Orders:  •  CBC and Platelet; Future  •  Comprehensive metabolic panel; Future  •  Hemoglobin A1C; Future  •  Lipid Panel with Direct LDL reflex; Future    Diabetes mellitus with peripheral circulatory disorder, controlled (HCC)    Lab Results   Component Value Date    HGBA1C 9.2 (H) 2025       Orders:  •  Insulin Glargine Solostar (Lantus SoloStar) 100 UNIT/ML SOPN; Inject 0.68 mL (68 Units total) under the skin in the morning BRAND NAME LANTUS MEDICALLY NECESSARY    Below-knee amputation of left lower extremity, sequela (HCC)                History of Present Illness   Here for a discussion about his insulin. He is convinced that his glucose control was good until he changed brands of insulin.  He would like to go back to lantus as brand medically necessary.  He states he is taking metformin as well, that was recently prescribed.       Review of Systems   Constitutional: Negative.    Respiratory: Negative.     Cardiovascular: Negative.    Endocrine: Negative for polydipsia, polyphagia and polyuria.       Objective   /76   Pulse 70   Temp 98.5 °F (36.9 °C)   Resp 18   Ht 5' 7\" (1.702 m)   Wt 88.4 kg (194 lb 12.8 oz)   BMI 30.51 kg/m²      Physical Exam  Constitutional:       Appearance: " Normal appearance.   HENT:      Head: Normocephalic and atraumatic.      Mouth/Throat:      Mouth: Mucous membranes are moist.   Eyes:      Pupils: Pupils are equal, round, and reactive to light.   Cardiovascular:      Rate and Rhythm: Normal rate and regular rhythm.   Pulmonary:      Effort: Pulmonary effort is normal.      Breath sounds: Normal breath sounds.   Musculoskeletal:         General: No swelling.   Neurological:      Mental Status: He is alert.

## 2025-06-07 LAB
ALBUMIN SERPL-MCNC: 4.2 G/DL (ref 3.9–4.9)
ALP SERPL-CCNC: 92 IU/L (ref 44–121)
ALT SERPL-CCNC: 20 IU/L (ref 0–44)
AST SERPL-CCNC: 18 IU/L (ref 0–40)
BILIRUB SERPL-MCNC: 0.4 MG/DL (ref 0–1.2)
BUN SERPL-MCNC: 18 MG/DL (ref 8–27)
BUN/CREAT SERPL: 16 (ref 10–24)
CALCIUM SERPL-MCNC: 9 MG/DL (ref 8.6–10.2)
CHLORIDE SERPL-SCNC: 94 MMOL/L (ref 96–106)
CHOLEST SERPL-MCNC: 205 MG/DL (ref 100–199)
CO2 SERPL-SCNC: 21 MMOL/L (ref 20–29)
CREAT SERPL-MCNC: 1.12 MG/DL (ref 0.76–1.27)
EGFR: 75 ML/MIN/1.73
ERYTHROCYTE [DISTWIDTH] IN BLOOD BY AUTOMATED COUNT: 12.3 % (ref 11.6–15.4)
EST. AVERAGE GLUCOSE BLD GHB EST-MCNC: 260 MG/DL
GLOBULIN SER-MCNC: 2.3 G/DL (ref 1.5–4.5)
GLUCOSE SERPL-MCNC: 352 MG/DL (ref 70–99)
HBA1C MFR BLD: 10.7 % (ref 4.8–5.6)
HCT VFR BLD AUTO: 43.8 % (ref 37.5–51)
HDLC SERPL-MCNC: 50 MG/DL
HGB BLD-MCNC: 14.4 G/DL (ref 13–17.7)
LDLC SERPL CALC-MCNC: 104 MG/DL (ref 0–99)
LDLC/HDLC SERPL: 2.1 RATIO (ref 0–3.6)
MCH RBC QN AUTO: 30.3 PG (ref 26.6–33)
MCHC RBC AUTO-ENTMCNC: 32.9 G/DL (ref 31.5–35.7)
MCV RBC AUTO: 92 FL (ref 79–97)
MICRODELETION SYND BLD/T FISH: NORMAL
PLATELET # BLD AUTO: 202 X10E3/UL (ref 150–450)
POTASSIUM SERPL-SCNC: 5.4 MMOL/L (ref 3.5–5.2)
PROT SERPL-MCNC: 6.5 G/DL (ref 6–8.5)
RBC # BLD AUTO: 4.76 X10E6/UL (ref 4.14–5.8)
SL AMB VLDL CHOLESTEROL CALC: 51 MG/DL (ref 5–40)
SODIUM SERPL-SCNC: 135 MMOL/L (ref 134–144)
TRIGL SERPL-MCNC: 299 MG/DL (ref 0–149)
WBC # BLD AUTO: 5.8 X10E3/UL (ref 3.4–10.8)

## 2025-06-19 ENCOUNTER — RESULTS FOLLOW-UP (OUTPATIENT)
Dept: FAMILY MEDICINE CLINIC | Facility: CLINIC | Age: 60
End: 2025-06-19

## 2025-06-19 ENCOUNTER — OFFICE VISIT (OUTPATIENT)
Dept: FAMILY MEDICINE CLINIC | Facility: CLINIC | Age: 60
End: 2025-06-19
Payer: COMMERCIAL

## 2025-06-19 VITALS
SYSTOLIC BLOOD PRESSURE: 112 MMHG | BODY MASS INDEX: 30.07 KG/M2 | TEMPERATURE: 97 F | DIASTOLIC BLOOD PRESSURE: 68 MMHG | RESPIRATION RATE: 14 BRPM | HEART RATE: 74 BPM | WEIGHT: 192 LBS | OXYGEN SATURATION: 95 %

## 2025-06-19 DIAGNOSIS — Z79.4 TYPE 2 DIABETES MELLITUS WITH RETINOPATHY OF LEFT EYE, WITH LONG-TERM CURRENT USE OF INSULIN, MACULAR EDEMA PRESENCE UNSPECIFIED, UNSPECIFIED RETINOPATHY SEVERITY (HCC): Primary | ICD-10-CM

## 2025-06-19 DIAGNOSIS — S88.112S BELOW-KNEE AMPUTATION OF LEFT LOWER EXTREMITY, SEQUELA (HCC): ICD-10-CM

## 2025-06-19 DIAGNOSIS — I10 BENIGN ESSENTIAL HYPERTENSION: Chronic | ICD-10-CM

## 2025-06-19 DIAGNOSIS — E78.2 MIXED HYPERLIPIDEMIA: Chronic | ICD-10-CM

## 2025-06-19 DIAGNOSIS — E11.319 TYPE 2 DIABETES MELLITUS WITH RETINOPATHY OF LEFT EYE, WITH LONG-TERM CURRENT USE OF INSULIN, MACULAR EDEMA PRESENCE UNSPECIFIED, UNSPECIFIED RETINOPATHY SEVERITY (HCC): Primary | ICD-10-CM

## 2025-06-19 PROCEDURE — 99214 OFFICE O/P EST MOD 30 MIN: CPT | Performed by: INTERNAL MEDICINE

## 2025-06-19 RX ORDER — INSULIN GLARGINE-YFGN 100 [IU]/ML
0.68 INJECTION, SOLUTION SUBCUTANEOUS DAILY
COMMUNITY
Start: 2025-03-17

## 2025-06-19 RX ORDER — METFORMIN HYDROCHLORIDE 500 MG/1
1000 TABLET, EXTENDED RELEASE ORAL
Qty: 180 TABLET | Refills: 3 | Status: SHIPPED | OUTPATIENT
Start: 2025-06-19

## 2025-06-19 RX ORDER — ROSUVASTATIN CALCIUM 40 MG/1
40 TABLET, COATED ORAL DAILY
Qty: 90 TABLET | Refills: 3 | Status: SHIPPED | OUTPATIENT
Start: 2025-06-19

## 2025-06-19 NOTE — ASSESSMENT & PLAN NOTE
Not to goal for his medical conditions  Will increase rosuvastatin and will continue to follow.   Orders:  •  rosuvastatin (CRESTOR) 40 MG tablet; Take 1 tablet (40 mg total) by mouth in the morning.  •  Hemoglobin A1C; Future  •  CBC and Platelet; Future  •  Lipid Panel with Direct LDL reflex; Future  •  Comprehensive metabolic panel; Future

## 2025-06-19 NOTE — ASSESSMENT & PLAN NOTE
Well controlled and will continue current medications.   Orders:  •  Hemoglobin A1C; Future  •  CBC and Platelet; Future  •  Lipid Panel with Direct LDL reflex; Future  •  Comprehensive metabolic panel; Future

## 2025-06-19 NOTE — ASSESSMENT & PLAN NOTE
Not well controlled.  We discussed options.  He is not willing to try mealtime insulin.  He will continue on current medications with a slight adjustment to metformin, and will see endocrinology for consult and further management. Discussed need for good foot and eye care.   Lab Results   Component Value Date    HGBA1C 10.7 (H) 06/05/2025       Orders:  •  Ambulatory Referral to Endocrinology; Future  •  metFORMIN (GLUCOPHAGE-XR) 500 mg 24 hr tablet; Take 2 tablets (1,000 mg total) by mouth daily with dinner  •  Hemoglobin A1C; Future  •  CBC and Platelet; Future  •  Lipid Panel with Direct LDL reflex; Future  •  Comprehensive metabolic panel; Future

## 2025-06-19 NOTE — PROGRESS NOTES
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  We discussed options.  He is not willing to try mealtime insulin.  He will continue on current medications with a slight adjustment to metformin, and will see endocrinology for consult and further management. Discussed need for good foot and eye care.   Lab Results   Component Value Date    HGBA1C 10.7 (H) 2025       Orders:  •  Ambulatory Referral to Endocrinology; Future  •  metFORMIN (GLUCOPHAGE-XR) 500 mg 24 hr tablet; Take 2 tablets (1,000 mg total) by mouth daily with dinner  •  Hemoglobin A1C; Future  •  CBC and Platelet; Future  •  Lipid Panel with Direct LDL reflex; Future  •  Comprehensive metabolic panel; Future    Mixed hyperlipidemia  Not to goal for his medical conditions  Will increase rosuvastatin and will continue to follow.   Orders:  •  rosuvastatin (CRESTOR) 40 MG tablet; Take 1 tablet (40 mg total) by mouth in the morning.  •  Hemoglobin A1C; Future  •  CBC and Platelet; Future  •  Lipid Panel with Direct LDL reflex; Future  •  Comprehensive metabolic panel; Future    Benign essential hypertension  Well controlled and will continue current medications.   Orders:  •  Hemoglobin A1C; Future  •  CBC and Platelet; Future  •  Lipid Panel with Direct LDL reflex; Future  •  Comprehensive metabolic panel; Future    Below-knee amputation of left lower extremity, sequela (HCC)  Continue current care.               History of Present Illness   Here for follow up of DM and other issues.  He is trying to take a walk every day. Continues to follow with his cardiologist.  Is not really watching his diet. We reviewed  labwork.   Denies hypoglycemic episodes.       Review of Systems   Constitutional: Negative.    Respiratory: Negative.  Negative for chest tightness, shortness of breath and wheezing.    Cardiovascular: Negative.  Negative for chest pain, palpitations and leg swelling.   Endocrine: Negative for polydipsia, polyphagia and polyuria.       Objective   /68   Pulse 74   Temp (!) 97 °F (36.1 °C)   Resp 14   Wt 87.1 kg (192 lb)   SpO2 95%   BMI 30.07 kg/m²      Physical Exam  Constitutional:       Appearance: Normal appearance.   HENT:      Head: Normocephalic and atraumatic.      Mouth/Throat:      Mouth: Mucous membranes are moist.     Eyes:      Pupils: Pupils are equal, round, and reactive to light.       Cardiovascular:      Rate and Rhythm: Normal rate and regular rhythm.      Heart sounds: No murmur heard.     No friction rub. No gallop.   Pulmonary:      Effort: Pulmonary effort is normal.      Breath sounds: Normal breath sounds. No wheezing, rhonchi or rales.   Abdominal:      General: Abdomen is flat. Bowel sounds are normal.      Palpations: Abdomen is soft.      Tenderness: There is no abdominal tenderness. There is no guarding.     Musculoskeletal:         General: No swelling.      Comments: S/p L BKA     Neurological:      Mental Status: He is alert.

## 2025-07-07 ENCOUNTER — OFFICE VISIT (OUTPATIENT)
Dept: FAMILY MEDICINE CLINIC | Facility: CLINIC | Age: 60
End: 2025-07-07
Payer: COMMERCIAL

## 2025-07-07 VITALS
OXYGEN SATURATION: 95 % | RESPIRATION RATE: 13 BRPM | BODY MASS INDEX: 30.45 KG/M2 | WEIGHT: 194 LBS | HEIGHT: 67 IN | SYSTOLIC BLOOD PRESSURE: 110 MMHG | DIASTOLIC BLOOD PRESSURE: 62 MMHG | TEMPERATURE: 97 F | HEART RATE: 70 BPM

## 2025-07-07 DIAGNOSIS — S88.112S BELOW-KNEE AMPUTATION OF LEFT LOWER EXTREMITY, SEQUELA (HCC): ICD-10-CM

## 2025-07-07 DIAGNOSIS — Z12.5 SCREENING PSA (PROSTATE SPECIFIC ANTIGEN): Primary | ICD-10-CM

## 2025-07-07 PROCEDURE — 99396 PREV VISIT EST AGE 40-64: CPT | Performed by: INTERNAL MEDICINE

## 2025-07-07 NOTE — PROGRESS NOTES
"Name: Yoav Talbert      : 1965      MRN: [id number]  Encounter Provider: Karmen Stern MD  Encounter Date: 2025   Encounter department: [facility]  :  Assessment & Plan           History of Present Illness {?Quick Links Encounters * My Last Note * Last Note in Specialty * Snapshot * Since Last Visit * History :55255}  HPI  Review of Systems   Constitutional: Negative.    Respiratory: Negative.  Negative for chest tightness.    Cardiovascular: Negative.    Endocrine: Negative for polydipsia, polyphagia and polyuria.       Objective {?Quick Links Trend Vitals * Enter New Vitals * Results Review * Timeline (Adult) * Labs * Imaging * Cardiology * Procedures * Lung Cancer Screening * Surgical eConsent :24452}  /62   Pulse 70   Temp (!) 97 °F (36.1 °C)   Resp 13   Ht 5' 7\" (1.702 m)   Wt 88 kg (194 lb)   SpO2 95%   BMI 30.38 kg/m²      Physical Exam  Constitutional:       Appearance: Normal appearance.   HENT:      Head: Normocephalic and atraumatic.      Mouth/Throat:      Mouth: Mucous membranes are moist.     Eyes:      Pupils: Pupils are equal, round, and reactive to light.       Cardiovascular:      Rate and Rhythm: Normal rate and regular rhythm.      Heart sounds: No murmur heard.     No friction rub. No gallop.   Pulmonary:      Effort: Pulmonary effort is normal.      Breath sounds: Normal breath sounds. No wheezing, rhonchi or rales.   Abdominal:      General: Abdomen is flat. Bowel sounds are normal.      Palpations: Abdomen is soft.      Tenderness: There is no abdominal tenderness. There is no guarding.     Musculoskeletal:         General: No swelling.     Neurological:      Mental Status: He is alert.       {Administrative / Billing Section (Optional):54185}  "

## 2025-07-07 NOTE — PROGRESS NOTES
Adult Annual Physical  Name: Yoav Talbert      : 1965      MRN: [id number]  Encounter Provider: Karmen Stern MD  Encounter Date: 2025   Encounter department: [facility]    :  Assessment & Plan  Screening PSA (prostate specific antigen)    Orders:    PSA Total (Reflex To Free); Future    Below-knee amputation of left lower extremity, sequela (HCC)  He continues to follow with his specialist.            Preventive Screenings:  - Diabetes Screening: screening not indicated, has diabetes, risks/benefits discussed and screening up-to-date  - Cholesterol Screening: screening not indicated, has hyperlipidemia, risks/benefits discussed and screening up-to-date   - Hepatitis C screening: screening up-to-date   - HIV screening: risks/benefits discussed   - Colon cancer screening: screening up-to-date   - Lung cancer screening: screening not indicated   - Prostate cancer screening: risks/benefits discussed     Immunizations:  - Immunizations due: Prevnar 20, Tdap and Zoster (Shingrix)  - The patient declines recommended vaccines currently despite my recommendations      Counseling/Anticipatory Guidance:  - Alcohol: discussed moderation in alcohol intake and recommendations for healthy alcohol use.   - Dental health: discussed importance of regular tooth brushing, flossing, and dental visits.   - Diet: discussed recommendations for a healthy/well-balanced diet.   - Exercise: the importance of regular exercise/physical activity was discussed. Recommend exercise 3-5 times per week for at least 30 minutes.   - Injury prevention: discussed safety/seat belts, safety helmets, smoke detectors, carbon monoxide detectors, and smoking near bedding or upholstery.          History of Present Illness     Adult Annual Physical:  Patient presents for annual physical. Her for CPE.  Feels well.  Going for labwork after this appointment. .     Diet and Physical Activity:  - Diet/Nutrition: diabetic diet.  -  "Exercise: walking and 5-7 times a week on average.    General Health:  - Sleep: sleeps well.  - Hearing: normal hearing bilateral ears.  - Vision: most recent eye exam < 1 year ago.  - Dental: regular dental visits.    /GYN Health:    - History of STDs: no     Health:  - History of STDs: no.     Review of Systems   Constitutional:  Negative for chills and fever.   HENT:  Negative for ear pain and sore throat.    Eyes:  Negative for pain and visual disturbance.   Respiratory:  Negative for cough and shortness of breath.    Cardiovascular:  Negative for chest pain and palpitations.   Gastrointestinal:  Negative for abdominal pain and vomiting.   Genitourinary:  Negative for dysuria and hematuria.   Musculoskeletal:  Negative for arthralgias and back pain.   Skin:  Negative for color change and rash.   Neurological:  Negative for seizures and syncope.   All other systems reviewed and are negative.        Objective   /62   Pulse 70   Temp (!) 97 °F (36.1 °C)   Resp 13   Ht 5' 7\" (1.702 m)   Wt 88 kg (194 lb)   SpO2 95%   BMI 30.38 kg/m²     Physical Exam  Vitals and nursing note reviewed.   Constitutional:       General: He is not in acute distress.     Appearance: He is well-developed.   HENT:      Head: Normocephalic and atraumatic.     Eyes:      Conjunctiva/sclera: Conjunctivae normal.       Cardiovascular:      Rate and Rhythm: Normal rate and regular rhythm.      Heart sounds: No murmur heard.  Pulmonary:      Effort: Pulmonary effort is normal. No respiratory distress.      Breath sounds: Normal breath sounds.   Abdominal:      Palpations: Abdomen is soft.      Tenderness: There is no abdominal tenderness.     Musculoskeletal:         General: No swelling.      Cervical back: Neck supple.     Skin:     General: Skin is warm and dry.      Capillary Refill: Capillary refill takes less than 2 seconds.     Neurological:      Mental Status: He is alert.     Psychiatric:         Mood and Affect: Mood " normal.

## 2025-07-08 LAB
ALBUMIN SERPL-MCNC: 3.9 G/DL (ref 3.9–4.9)
ALP SERPL-CCNC: 86 IU/L (ref 44–121)
ALT SERPL-CCNC: 22 IU/L (ref 0–44)
AST SERPL-CCNC: 19 IU/L (ref 0–40)
BILIRUB SERPL-MCNC: 0.3 MG/DL (ref 0–1.2)
BUN SERPL-MCNC: 14 MG/DL (ref 8–27)
BUN/CREAT SERPL: 14 (ref 10–24)
CALCIUM SERPL-MCNC: 8.6 MG/DL (ref 8.6–10.2)
CHLORIDE SERPL-SCNC: 103 MMOL/L (ref 96–106)
CHOLEST SERPL-MCNC: 146 MG/DL (ref 100–199)
CO2 SERPL-SCNC: 21 MMOL/L (ref 20–29)
CREAT SERPL-MCNC: 0.98 MG/DL (ref 0.76–1.27)
EGFR: 87 ML/MIN/1.73
ERYTHROCYTE [DISTWIDTH] IN BLOOD BY AUTOMATED COUNT: 12.4 % (ref 11.6–15.4)
EST. AVERAGE GLUCOSE BLD GHB EST-MCNC: 306 MG/DL
GLOBULIN SER-MCNC: 2.3 G/DL (ref 1.5–4.5)
GLUCOSE SERPL-MCNC: 264 MG/DL (ref 70–99)
HBA1C MFR BLD: 12.3 % (ref 4.8–5.6)
HCT VFR BLD AUTO: 41.4 % (ref 37.5–51)
HDLC SERPL-MCNC: 42 MG/DL
HGB BLD-MCNC: 13.8 G/DL (ref 13–17.7)
LDLC SERPL CALC-MCNC: 74 MG/DL (ref 0–99)
LDLC/HDLC SERPL: 1.8 RATIO (ref 0–3.6)
MCH RBC QN AUTO: 30.5 PG (ref 26.6–33)
MCHC RBC AUTO-ENTMCNC: 33.3 G/DL (ref 31.5–35.7)
MCV RBC AUTO: 92 FL (ref 79–97)
MICRODELETION SYND BLD/T FISH: NORMAL
PLATELET # BLD AUTO: 180 X10E3/UL (ref 150–450)
POTASSIUM SERPL-SCNC: 4.8 MMOL/L (ref 3.5–5.2)
PROT SERPL-MCNC: 6.2 G/DL (ref 6–8.5)
RBC # BLD AUTO: 4.52 X10E6/UL (ref 4.14–5.8)
SL AMB VLDL CHOLESTEROL CALC: 30 MG/DL (ref 5–40)
SODIUM SERPL-SCNC: 141 MMOL/L (ref 134–144)
TRIGL SERPL-MCNC: 178 MG/DL (ref 0–149)
WBC # BLD AUTO: 5.1 X10E3/UL (ref 3.4–10.8)

## 2025-07-11 ENCOUNTER — TELEPHONE (OUTPATIENT)
Age: 60
End: 2025-07-11

## (undated) DEVICE — MICROSURGICAL INSTRUMENT IRR. CYSTITOME 25GA STRAIGHT-REVERSE CUTTING: Brand: ALCON

## (undated) DEVICE — CENTURION VISION SYSTEM FMS

## (undated) DEVICE — ACTIVE FMS W/ INTREPID* ULTRA SLEEVES, 0.9MM 45° ABS* INTREPID* BALANCED TIP: Brand: ALCON

## (undated) DEVICE — GLOVE SRG BIOGEL 7.5

## (undated) DEVICE — THE MONARCH® "D" CARTRIDGE IS A SINGLE-USE POLYPROPYLENE CARTRIDGE FOR POSTERIOR CHAMBER IOL DELIVERY: Brand: MONARCH® III

## (undated) DEVICE — B-H IRRIGATING CAN 19GA FLAT ANGLED 8MM: Brand: OPHTHALMIC CANNULA

## (undated) DEVICE — EYE PACK CUSTOM -FINNEGAN

## (undated) DEVICE — CLEARCUT® SLIT KNIFE INTREPID MICRO-COAXIAL SYSTEM 2.4 SB: Brand: CLEARCUT®; INTREPID

## (undated) DEVICE — AIR INJECT CANNULA 27GA: Brand: OPHTHALMIC CANNULA

## (undated) DEVICE — INTREPID® TRANSFORMER IA HP: Brand: INTREPID®